# Patient Record
Sex: MALE | Race: WHITE | NOT HISPANIC OR LATINO | Employment: FULL TIME | URBAN - METROPOLITAN AREA
[De-identification: names, ages, dates, MRNs, and addresses within clinical notes are randomized per-mention and may not be internally consistent; named-entity substitution may affect disease eponyms.]

---

## 2017-01-13 ENCOUNTER — OFFICE VISIT (OUTPATIENT)
Dept: CARDIOLOGY | Facility: CLINIC | Age: 57
End: 2017-01-13
Payer: COMMERCIAL

## 2017-01-13 VITALS
HEIGHT: 74 IN | WEIGHT: 315 LBS | OXYGEN SATURATION: 97 % | SYSTOLIC BLOOD PRESSURE: 126 MMHG | DIASTOLIC BLOOD PRESSURE: 89 MMHG | RESPIRATION RATE: 16 BRPM | BODY MASS INDEX: 40.43 KG/M2 | HEART RATE: 86 BPM

## 2017-01-13 DIAGNOSIS — I10 ESSENTIAL HYPERTENSION: ICD-10-CM

## 2017-01-13 DIAGNOSIS — R07.9 CHEST PAIN, UNSPECIFIED TYPE: Primary | ICD-10-CM

## 2017-01-13 DIAGNOSIS — I20.9 ANGINA PECTORIS: ICD-10-CM

## 2017-01-13 DIAGNOSIS — E66.01 OBESITY, MORBID, BMI 40.0-49.9: ICD-10-CM

## 2017-01-13 DIAGNOSIS — E78.5 HYPERLIPIDEMIA, UNSPECIFIED HYPERLIPIDEMIA TYPE: ICD-10-CM

## 2017-01-13 DIAGNOSIS — G47.33 OSA (OBSTRUCTIVE SLEEP APNEA): ICD-10-CM

## 2017-01-13 DIAGNOSIS — I25.10 CORONARY ARTERY DISEASE INVOLVING NATIVE CORONARY ARTERY OF NATIVE HEART, ANGINA PRESENCE UNSPECIFIED: ICD-10-CM

## 2017-01-13 PROCEDURE — 3074F SYST BP LT 130 MM HG: CPT | Mod: S$GLB,,, | Performed by: NURSE PRACTITIONER

## 2017-01-13 PROCEDURE — 93000 ELECTROCARDIOGRAM COMPLETE: CPT | Mod: S$GLB,,, | Performed by: INTERNAL MEDICINE

## 2017-01-13 PROCEDURE — 99999 PR PBB SHADOW E&M-EST. PATIENT-LVL IV: CPT | Mod: PBBFAC,,, | Performed by: NURSE PRACTITIONER

## 2017-01-13 PROCEDURE — 3079F DIAST BP 80-89 MM HG: CPT | Mod: S$GLB,,, | Performed by: NURSE PRACTITIONER

## 2017-01-13 PROCEDURE — 1159F MED LIST DOCD IN RCRD: CPT | Mod: S$GLB,,, | Performed by: NURSE PRACTITIONER

## 2017-01-13 PROCEDURE — 99215 OFFICE O/P EST HI 40 MIN: CPT | Mod: S$GLB,,, | Performed by: NURSE PRACTITIONER

## 2017-01-13 RX ORDER — METOPROLOL TARTRATE 25 MG/1
1 TABLET, FILM COATED ORAL 2 TIMES DAILY
Refills: 1 | COMMUNITY
Start: 2016-12-16 | End: 2017-01-13 | Stop reason: SDUPTHER

## 2017-01-13 RX ORDER — ATORVASTATIN CALCIUM 40 MG/1
40 TABLET, FILM COATED ORAL DAILY
Qty: 90 TABLET | Refills: 3 | Status: SHIPPED | OUTPATIENT
Start: 2017-01-13 | End: 2020-10-21

## 2017-01-13 RX ORDER — DOXYCYCLINE HYCLATE 50 MG/1
CAPSULE ORAL
Refills: 5 | COMMUNITY
Start: 2016-12-15 | End: 2020-10-21

## 2017-01-13 RX ORDER — METOPROLOL SUCCINATE 25 MG/1
25 TABLET, EXTENDED RELEASE ORAL DAILY
Qty: 90 TABLET | Refills: 3 | Status: SHIPPED | OUTPATIENT
Start: 2017-01-13 | End: 2020-10-21

## 2017-01-13 RX ORDER — IVERMECTIN 10 MG/G
CREAM TOPICAL
Refills: 5 | COMMUNITY
Start: 2016-10-06 | End: 2020-10-21

## 2017-01-13 NOTE — MR AVS SNAPSHOT
[facility]  [address], [address]  [facility] [address]  Phone: [phone]                  Otis Colón   2017 11:00 AM   Office Visit    Description:  Male : 1960   Provider:  Alexia Frias NP   Department:  [facility] [place] [facility]           Reason for Visit     Hospital Follow Up           Diagnoses this Visit        Comments    Chest pain, unspecified type    -  Primary     Hyperlipidemia, unspecified hyperlipidemia type         Coronary artery disease involving native coronary artery of native heart, angina presence unspecified         Essential hypertension         Angina pectoris         Obesity, morbid, BMI 40.0-49.9         BLANKA (obstructive sleep apnea)                To Do List           Future Appointments        Provider Department Dept Phone    2017 11:00 AM Mitchell Swartz MD [facility] [phone]      Goals (5 Years of Data)     None      Follow-Up and Disposition     Return in about 6 months (around 2017).       These Medications        Disp Refills Start End    atorvastatin (LIPITOR) 40 MG tablet 90 tablet 3 2017    Take 1 tablet (40 mg total) by mouth once daily. - Oral    Pharmacy: [facility] [address] [facility] [address] [facility] Ph #: [phone]       metoprolol succinate (TOPROL-XL) 25 MG 24 hr tablet 90 tablet 3 2017    Take 1 tablet (25 mg total) by mouth once daily. - Oral    Pharmacy: [facility] [address] [facility] [address] [facility] Ph #: [phone]         [facility]     [facility] -  Assistance  Registered nurses in the [facility] provide clinical advisement, health education, appointment booking, and other advisory services.  Call for this free service at [phone].             Medications           Message regarding Medications     Verify  "the changes and/or additions to your medication regime listed below are the same as discussed with your clinician today.  If any of these changes or additions are incorrect, please notify your healthcare provider.        STOP taking these medications     metoprolol tartrate (LOPRESSOR) 25 MG tablet Take 1 tablet by mouth 2 (two) times daily.           Verify that the below list of medications is an accurate representation of the medications you are currently taking.  If none reported, the list may be blank. If incorrect, please contact your healthcare provider. Carry this list with you in case of emergency.           Current Medications     aspirin 81 MG Chew Take 4 tablets (324 mg total) by mouth once daily.    atorvastatin (LIPITOR) 40 MG tablet Take 1 tablet (40 mg total) by mouth once daily.    butalbital-acetaminophen-caffeine -40 mg (FIORICET, ESGIC) -40 mg per tablet Take 1 tablet by mouth every 4 (four) hours as needed for Pain.    doxycycline (VIBRAMYCIN) 50 MG capsule TK 1 C PO D    losartan-hydrochlorothiazide 100-25 mg (HYZAAR) 100-25 mg per tablet Take 1 tablet by mouth once daily.    methocarbamol (ROBAXIN) 500 MG Tab Take 500 mg by mouth every 8 (eight) hours as needed (muscle pain).     metoprolol succinate (TOPROL-XL) 25 MG 24 hr tablet Take 1 tablet (25 mg total) by mouth once daily.    SOOLANTRA 1 % Crea Apply to skin at bedtime           Clinical Reference Information           Vital Signs - Last Recorded  Most recent update: 1/13/2017 11:11 AM by Joseline Gregg LPN    BP Pulse Resp Ht Wt SpO2    126/89 (BP Location: Left arm, Patient Position: Sitting, BP Method: Automatic) 86 16 6' 2" (1.88 m) (!) 148 kg (326 lb 4.5 oz) 97%    BMI                41.89 kg/m2          Blood Pressure          Most Recent Value    Left Arm BP - Sitting  126/89    BP  126/89      Allergies as of 1/13/2017     No Known Allergies      Immunizations Administered on Date of Encounter - 1/13/2017     None    "   Orders Placed During Today's Visit     Future Labs/Procedures Expected by Expires    EKG 12-lead  As directed 1/13/2018      Instructions    Recommended Mediterranean dietEating Heart-Healthy Food: Using the DASH Plan  Eating for your heart doesnt have to be hard or boring. You just need to know how to make healthier choices. The DASH eating plan has been developed to help you do just that. DASH stands for Dietary Approaches to Stop Hypertension. It is a plan that has been proven to be healthier for your heart and to lower your risk for high blood pressure. It can also help lower your risk for cancer, heart disease, osteoporosis, and diabetes.  Choosing from Each Food Group  Choose foods from each of the food groups below each day. Try to get the recommended number of servings for each food group. The serving numbers are based on a diet of 2,000 calories a day. Talk to your doctor if youre unsure about your calorie needs.  Grains   Servings: 7-8 a day  A serving is:  · 1 slice bread  · 1 ounce dry cereal  · half a cup cooked rice or pasta  Best choices: Whole grains and any grains high in fiber.  Vegetables   Servings: 4-5 a day  A serving is:  · 1 cup raw leafy vegetable  · Half a cup cooked vegetable  · Three-quarter cup vegetable juice  Best choices: Fresh or frozen vegetable prepared without too much added salt or fat.    Fruits   Servings: 4-5 a day  A serving is:  · Three-quarter cup fruit juice  · 1 medium fruit  · One-quarter cup dried fruit  · One-half cup fresh, frozen, or canned fruit  Best choices: A variety of fresh fruits of different colors. Whole fruits are a much better choice than fruit juices.  Low-fat or Fat Free Dairy   Servings: 2-3 a day  A serving is:  · 8 ounces milk  · 1 cup yogurt  · One and a half ounces cheese  Best choices: Skim or 1% milk, low-fat or fat free yogurt or buttermilk, and low-fat cheeses.       Meat, Poultry, Fish   Servings: 2 or fewer a day  A serving is:  · 3 ounces  cooked meat, poultry, or fish  Best choices: Lean meats and fish. Trim away visible fat. Broil, roast, or boil instead of frying. Remove skin from poultry before eating.  Nuts, Seeds, Beans   Servings: 4-5 a week  A serving is:  · One third cup nuts (or one and a half ounces)  · 2 tablespoons sunflower seeds  · Half a cup cooked beans  Best choices: Dry roasted nuts with no salt added, lentils, kidney beans, garbanzo beans, and whole garza beans.    Fats and Oils   Servings: 2 a day  A serving is:  · 1 teaspoon vegetable oil  · 1 teaspoon soft margarine  · 1 tablespoon low-fat mayonnaise  · 1 teaspoon regular mayonnaise  · 2 tablespoons light salad dressing  · 1 tablespoon regular salad dressing  Best choices: Monounsaturated and polyunsaturated fats such as olive, canola, or safflower oil.  Sweets   Servings: 5 a week or fewer  A serving is:  · 1 tablespoon sugar, maple syrup, or honey  · 1 tablespoon jam or jelly  · 1 half-ounce jelly beans (about 15)  · 8 ounces lemonade  Best choices: Dried fruit can be a satisfying sweet. Choose low-fat sweets when possible. And watch your serving sizes!         Aerobic Exercise for a Healthy Heart  Exercise is a lot more than an energy booster and a stress reliever. It also strengthens your heart muscle, lowers your blood pressure and blood cholesterol, and burns calories.      Remember, some activity is better than none.     Choose an Aerobic Activity  Choose a nonstop activity that makes your heart and lungs work harder than they do when you rest or walk normally. This aerobic exercise can improve the way your heart and other muscles use oxygen. Make it fun by exercising with a friend and choosing an activity you enjoy. Here are some ideas:  · Walking  · Swimming  · Bicycling  · Stair climbing  · Dancing  · Jogging  Exercise Regularly  If you havent been exercising regularly,  get your doctors okay first. Then start slowly.  Here are some tips:  · Begin exercising 3  times a week for 5-10 minutes at a time.  · When you feel comfortable, add a few minutes each week.  · Slowly build up to exercising 3-4 times each week for 20-40 minutes. Aim for a total of 150 or more minutes a week.  · Be sure to carry your nitroglycerin with you when you exercise.  · If you get angina when youre exercising, stop what youre doing, take your nitroglycerin, and call your doctor.  © 5513-5868 [employer] [facility], [address] [place] [address], [place] [address]. All rights reserved. This information is not intended as a substitute for professional medical care. Always follow your healthcare professional's instructions.

## 2017-01-13 NOTE — PATIENT INSTRUCTIONS
Recommended Mediterranean dietEating Heart-Healthy Food: Using the DASH Plan  Eating for your heart doesnt have to be hard or boring. You just need to know how to make healthier choices. The DASH eating plan has been developed to help you do just that. DASH stands for Dietary Approaches to Stop Hypertension. It is a plan that has been proven to be healthier for your heart and to lower your risk for high blood pressure. It can also help lower your risk for cancer, heart disease, osteoporosis, and diabetes.  Choosing from Each Food Group  Choose foods from each of the food groups below each day. Try to get the recommended number of servings for each food group. The serving numbers are based on a diet of 2,000 calories a day. Talk to your doctor if youre unsure about your calorie needs.  Grains   Servings: 7-8 a day  A serving is:  · 1 slice bread  · 1 ounce dry cereal  · half a cup cooked rice or pasta  Best choices: Whole grains and any grains high in fiber.  Vegetables   Servings: 4-5 a day  A serving is:  · 1 cup raw leafy vegetable  · Half a cup cooked vegetable  · Three-quarter cup vegetable juice  Best choices: Fresh or frozen vegetable prepared without too much added salt or fat.    Fruits   Servings: 4-5 a day  A serving is:  · Three-quarter cup fruit juice  · 1 medium fruit  · One-quarter cup dried fruit  · One-half cup fresh, frozen, or canned fruit  Best choices: A variety of fresh fruits of different colors. Whole fruits are a much better choice than fruit juices.  Low-fat or Fat Free Dairy   Servings: 2-3 a day  A serving is:  · 8 ounces milk  · 1 cup yogurt  · One and a half ounces cheese  Best choices: Skim or 1% milk, low-fat or fat free yogurt or buttermilk, and low-fat cheeses.       Meat, Poultry, Fish   Servings: 2 or fewer a day  A serving is:  · 3 ounces cooked meat, poultry, or fish  Best choices: Lean meats and fish. Trim away visible fat. Broil, roast, or boil instead of frying. Remove skin  from poultry before eating.  Nuts, Seeds, Beans   Servings: 4-5 a week  A serving is:  · One third cup nuts (or one and a half ounces)  · 2 tablespoons sunflower seeds  · Half a cup cooked beans  Best choices: Dry roasted nuts with no salt added, lentils, kidney beans, garbanzo beans, and whole garza beans.    Fats and Oils   Servings: 2 a day  A serving is:  · 1 teaspoon vegetable oil  · 1 teaspoon soft margarine  · 1 tablespoon low-fat mayonnaise  · 1 teaspoon regular mayonnaise  · 2 tablespoons light salad dressing  · 1 tablespoon regular salad dressing  Best choices: Monounsaturated and polyunsaturated fats such as olive, canola, or safflower oil.  Sweets   Servings: 5 a week or fewer  A serving is:  · 1 tablespoon sugar, maple syrup, or honey  · 1 tablespoon jam or jelly  · 1 half-ounce jelly beans (about 15)  · 8 ounces lemonade  Best choices: Dried fruit can be a satisfying sweet. Choose low-fat sweets when possible. And watch your serving sizes!         Aerobic Exercise for a Healthy Heart  Exercise is a lot more than an energy booster and a stress reliever. It also strengthens your heart muscle, lowers your blood pressure and blood cholesterol, and burns calories.      Remember, some activity is better than none.     Choose an Aerobic Activity  Choose a nonstop activity that makes your heart and lungs work harder than they do when you rest or walk normally. This aerobic exercise can improve the way your heart and other muscles use oxygen. Make it fun by exercising with a friend and choosing an activity you enjoy. Here are some ideas:  · Walking  · Swimming  · Bicycling  · Stair climbing  · Dancing  · Jogging  Exercise Regularly  If you havent been exercising regularly,  get your doctors okay first. Then start slowly.  Here are some tips:  · Begin exercising 3 times a week for 5-10 minutes at a time.  · When you feel comfortable, add a few minutes each week.  · Slowly build up to exercising 3-4 times  each week for 20-40 minutes. Aim for a total of 150 or more minutes a week.  · Be sure to carry your nitroglycerin with you when you exercise.  · If you get angina when youre exercising, stop what youre doing, take your nitroglycerin, and call your doctor.  © 1846-2783 [employer], [address] [place] [address], [place] [address]. All rights reserved. This information is not intended as a substitute for professional medical care. Always follow your healthcare professional's instructions.

## 2017-01-13 NOTE — PROGRESS NOTES
Subjective:    Patient ID:  Otis Colón is a 56 y.o. male who presents for Hospital Follow Up    PCP: Dr. Price   Neurologist: Dr. 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 male with a PMH HTN and cervical Stenosis. He presented to the hospital with acute chest pain. It started while he was playing golf. Due to his chronic neck pain, he took 2 aleve prior to playing golf. The pain was severe, mid-chest, and radiated through to the back. It was a sharp pressure. It was associated with SOB, nausea, and diaphoresis. He went home to rest, but when the pain did not go away, he came to the hospital. He also reported that last night while having a BM, he became weak and SOB. He took 2 muscle realaxants and went to sleep. On arrival to the ED, he was found to be hypotensive and he was bolused IVF. A CTA chest/abd was done to r/o aneurysm dissection, and it was negative. Also while in the ED, the patient reports that he had a seizure. He did not lose consciousness or awareness during the episode. A dose of ativan was given in the ED. He reported that he was still having the chest pain, but it is much better than earlier. Patient denied abdominal pain, vomiting, headache, vision changes, focal neuro-deficits, cough or fever. Patient was admitted to Hospitalist medicine service. Patient was evaluated by Dr. Márquez. Patient was monitored on telemetry medical floor, serial cardiac enzymes remained negative. Patient was evaluated by cardiologist and had further cardiac workup as mentioned below, which was negative for acute ischemia. Patient's symptoms resolved. Patient was discharged home in  "stable condition with following discharge plan of care. Total time with the patient was 30 minutes and greater than 50% was spent in counseling and coordination of care. The assessment and plan have been discussed at length. Physicians' notes reviewed. Labs and procedure reviewed.      HPI   During admission, troponins were negative and his EKG did not reveal any evidence of acute ischemia.  He had an abnormal stress test on 11/05/2016 which showed a drop in EF from 54% to 41% with stress (additional results below).  Given abnormal stress test and concern for left main or multivessel disease, the patient underwent a LHC on 11/07/2016.  LHC results revealed non-obstructive CAD (diffuse, distal disease).     Goal was to get patient on opitimal medication treatment.  He was discharged home on ASA 81mg daily, lipitor 40mg daily, Toprol XL 25 mg daily, and losartan-hctz 100-25mg daily. ARB initially held during admission given concern for SHANNON (improved with IVFs).  Patient has followed up with his PCP, Dr. Price and recent labs done in mid December 2016.  Patient discussed lab results with Dr. Price and reports that he was told that his kidneys "look good" and was advised to continued losartan-hctz.  He has been avoiding NSAIDs.  Patient went home on Metoprol succinate 25 mg daily but ran out of the medication after 30 days. States Dr. Price gave him a prescription for Metoprolol tartrate 25mg twice daily which he has only been taking once a day because he"hought it was same as the other Metorpolol."  He says that he is only taking metoprol tartrate once daily. Patient ran out of Lipitor a month after being discharged and has not taken it since.  States he didn't ask for a Lipitor prescription when he saw his PCP.   Has been taking ASA 81 mg daily and and losartan-hctz 100-25mg once daily.     Patient reports having 3 episodes of an acute decrease in energy ("low energy") with associated "real mild" chest pain at " "left lower sternal border since he was discharged on 11/08/2016.  Episodes lasted several hours.  Last episode was 1.5 - 2 weeks ago.  Patient reports that his SBP drops to 105 mmHg while his HR goes into the "high 90s" during these episodes.  Denies SOB/BARONE, dizziness, palpitations, syncope, sweating, abdominal pain, n/v.  + chronic neck pain, currently at baseline for patient.  He has been staying active with playing golf and working in his yard cutting grass on 1.5 acre lot since disharged.  Denies difficulty with these activities.  Had sleep study done with PCP 3 weeks ago, awaiting CPAP     Recent cardiology tests  [facility] 11/10/2016  B. Summary/Post-Operative Diagnosis   Non-obstructive CAD.    D. Hemodynamic Results   LVEDP (Post): 17 mmHg    Air rest:  AO: 128/77    E. Angiographic Results   Diagnostic:        Patient has a right dominant coronary artery.        - Left Main Coronary Artery:             The LM is normal. There is BARBARA 3 flow.     - Left Anterior Descending Artery:             The LAD has luminal irregularities. There is BARBARA 3 flow.     - Left Circumflex Artery:             The LCX has luminal irregularities. There is BARBARA 3 flow.     - Right Coronary Artery:             The RCA has luminal irregularities. There is BARBARA 3 flow.     - Common Femoral Artery:             The right CFA is normal.     - Femoral Artery:             The femoral artery is normal.     G. Recommendations   1. Routine post-cath care.  2. Maximize medical management.  3. Reassurance.  4. Risk factor reductions.  5. ASA 81mg.  6. Weight loss.  7. Statin therapy.    NM stress test/Lexiscan 11/05/2017:  EKG Conclusions:    1. The EKG portion of this study is negative for ischemia at a peak heart rate of 97 bpm (59% of predicted).   2. Blood pressure remained stable throughout the protocol  (Presenting BP: 139/102 Peak BP: 153/84).   3. No significant arrhythmias were present.   4. The patient reported significant chest pain and " "dyspnea during the protocol which resolved in recovery.     1.  No scintigraphic evidence for reversible cardiac ischemia or infarct.    2. Global hypokinesia and decreased left ventricular ejection fraction (41%) during the stress portion of the examination.  There was normal wall motion and a normal left ventricular ejection fraction (54%) at rest.    Echo 11/05/2016  CONCLUSIONS     1 - Normal left ventricular systolic function (EF 55-60%).     2 - Normal left ventricular diastolic function.     3 - Normal right ventricular systolic function .     Retroperitoneal US 11/06/2016  Findings:The the right kidney measures approximately 11.4 x 4.5 x 4.2 cm.  Normal corticomedullary differentiation is seen.  No hydronephrosis, stone or focal mass is seen.  Normal resistive index of 0.61 is noted within an interpolar region intrarenal artery.    The left kidney measures approximately 12.2 x 5.4 x 4.5 cm.  Normal corticomedullary differentiation is seen.  No hydronephrosis, stone or focal mass is seen.  Normal resistive index of 0.58 is noted within an interpolar region intrarenal artery.    The urinary bladder is mildly well-distended.  No focal bladder wall thickening noted.    Study slightly challenging secondary to patient body habitus.    CTA chest and abdomen 11/04/2017  1.  No evidence for aortic aneurysm, dissection or other acute findings in the chest, abdomen or pelvis to explain this patient's symptoms.  2.  Abnormal findings:  -Prior lower cervical ACDF  -Hepatic steatosis.      Review of Systems   Constitution: Negative for chills, diaphoresis, fever, weakness, night sweats and weight gain.        3 episodes of having "no energy" lasting hours.  Last episode 1.5 to 2 weeks ago.    HENT: Negative for congestion and sore throat.    Eyes: Negative for visual disturbance.   Cardiovascular: Positive for chest pain ("real mild" CP x 3 episodes - last episode 1.5 - 2 weeks ago. ). Negative for dyspnea on exertion, " irregular heartbeat, leg swelling, near-syncope, orthopnea and palpitations.   Respiratory: Positive for cough (mild, intermittent with yellow sputum x 8 weeks, improving now ) and snoring. Negative for hemoptysis and shortness of breath.    Endocrine: Negative for cold intolerance, heat intolerance, polydipsia, polyphagia and polyuria.   Hematologic/Lymphatic: Does not bruise/bleed easily.   Skin: Negative for color change, itching and rash.   Musculoskeletal: Negative for arthritis, back pain and joint swelling.   Gastrointestinal: Negative for abdominal pain, constipation, diarrhea, heartburn, hematochezia, melena, nausea and vomiting.   Genitourinary: Negative for dysuria and hematuria.   Neurological: Negative for dizziness, focal weakness, light-headedness, numbness, paresthesias and sensory change.   Psychiatric/Behavioral: Negative for depression.   Allergic/Immunologic: Negative for environmental allergies.             Objective:    Physical Exam   Constitutional: He is oriented to person, place, and time. He appears well-developed and well-nourished. No distress.   HENT:   Head: Normocephalic and atraumatic.   Eyes: Conjunctivae and EOM are normal. Right eye exhibits no discharge. Left eye exhibits no discharge. No scleral icterus.   Neck: Normal range of motion. Normal carotid pulses and no JVD present. Carotid bruit is not present.   Cardiovascular: Normal rate, regular rhythm and normal heart sounds.  Exam reveals no gallop and no friction rub.    No murmur heard.  Pulses:       Radial pulses are 2+ on the right side, and 2+ on the left side.        Dorsalis pedis pulses are 1+ on the right side, and 1+ on the left side.   Pulmonary/Chest: Effort normal and breath sounds normal. He has no wheezes. He has no rales. He exhibits no tenderness.   Abdominal: Soft. Bowel sounds are normal. There is no tenderness. There is no guarding.   Musculoskeletal: Normal range of motion.   No evidence of LE pitting  "edema.    Neurological: He is alert and oriented to person, place, and time.   Skin: Skin is warm and dry. He is not diaphoretic. No cyanosis. Nails show no clubbing.   Psychiatric: He has a normal mood and affect. His behavior is normal.   Vitals reviewed.  Waist circumference: 52.5 inches  Hip circumference:50.5 inches  Neck circumference:  18 inches  Stanville score: 4        Visit Vitals    /89 (BP Location: Left arm, Patient Position: Sitting, BP Method: Automatic)    Pulse 86    Resp 16    Ht 6' 2" (1.88 m)    Wt (!) 148 kg (326 lb 4.5 oz)    SpO2 97%    BMI 41.89 kg/m2       In office EKG:  NSR, 76 bpm, normal axis, normal conduction, no significant ST segment or t wave abnormalities.     Assessment:         Patient Active Problem List   Diagnosis    Acute chest pain    Acute renal failure    Angina pectoris    Stenosis, cervical spine    Obesity, morbid, BMI 40.0-49.9    Essential hypertension    CAD (coronary artery disease)    BLANKA (obstructive sleep apnea)        Plan:             CAD, HTN, HLD, Chest pain   - doing well.      - continue statin (lipitor 40mg), ASA, BB, and ARB         - ran out of Toprol XL and lipitor one month after being discharged.  Saw PCP and obtained a prescription for metoprolol but received Rx for metoprolol tartrate instead of Toprol XL.  Patient was only taking metoprolol tartrate once daily instead of twice daily.                            - Patient advised to STOP taking Metoprolol tartrate and to RESUME taking Metoprolol succinate 25 mg once daily                                        - ORDER placed for Metoprolol succinate 25 mg po once daily. 90 tablets with 3 refills.         - ARB held briefly during admission in 11/2016 given concern for SHANNON. ARB was resumed after improvement in renal after IVFs          - recent labs with PCP in mid December 2016, told by PCP "kidneys look good."                          -  Patient will be faxing over lab results. "   - In office EKG   - Check home blood pressure, 2 days weekly, do 2 readings within 5 minutes in AM and PM, keep log for review.  - needs to reduce risk  - weight loss  - Instruction for Mediterranean diet and heart healthy exercise given.  - f/u in 6 months with Dr. Swartz; if doing well at that point, semi-annual or annual visits may be reasonable.     BLANKA  - Had sleep study done with PCP 3 weeks ago, awaiting CPAP     Morbid obesity   - counseled patient on weight loss.   - Needs good exercise program, 4 key elements: 1. Aerobic (walking, swimming, dancing, jogging, biking, etc, 2. Muscle strengthening / resistance exercise, need to do 2-3 times weekly, 3. Stretching daily, good stretch takes a whole  total minute. 4. Balance exercise daily.      Total patient time was 40  minutes and greater than 50% was spent in counseling and coordination of care. Labs and procedures reviewed.   Problem List reviewed.  Alexia FISHER, NP-C

## 2017-06-02 ENCOUNTER — TELEPHONE (OUTPATIENT)
Dept: CARDIOLOGY | Facility: CLINIC | Age: 57
End: 2017-06-02

## 2017-06-02 NOTE — TELEPHONE ENCOUNTER
"Pt call transferred in from phone room. Spoke to pt about rescheduling appointment per Dr Swartz. Pt stated that he "was tired of being rescheduled and getting a run around." Pt requested that his appointment be completely canceled and that he would find another Cardiologist if he felt he needed to be seen.   I offered pt several other options including being worked in sooner if possible. Pt refused and continued to state that he did not want to reschedule and to cancel the appointment.  Appointment was canceled per pt request.   "

## 2017-06-02 NOTE — TELEPHONE ENCOUNTER
Called pt, no answer. Per dr. Swartz pt needs to reschedule. Left message with clinic # to call back.

## 2020-10-21 ENCOUNTER — HOSPITAL ENCOUNTER (OUTPATIENT)
Facility: HOSPITAL | Age: 60
Discharge: HOME | End: 2020-10-22
Attending: EMERGENCY MEDICINE | Admitting: STUDENT IN AN ORGANIZED HEALTH CARE EDUCATION/TRAINING PROGRAM
Payer: MEDICAID

## 2020-10-21 ENCOUNTER — OFFICE VISIT (OUTPATIENT)
Dept: FAMILY MEDICINE | Facility: CLINIC | Age: 60
End: 2020-10-21
Payer: MEDICAID

## 2020-10-21 ENCOUNTER — CLINICAL SUPPORT (OUTPATIENT)
Dept: CARDIOLOGY | Facility: HOSPITAL | Age: 60
End: 2020-10-21
Attending: EMERGENCY MEDICINE
Payer: MEDICAID

## 2020-10-21 VITALS
OXYGEN SATURATION: 97 % | HEIGHT: 74 IN | DIASTOLIC BLOOD PRESSURE: 96 MMHG | SYSTOLIC BLOOD PRESSURE: 130 MMHG | TEMPERATURE: 98 F | BODY MASS INDEX: 40.43 KG/M2 | WEIGHT: 315 LBS | HEART RATE: 75 BPM

## 2020-10-21 DIAGNOSIS — E66.01 OBESITY, MORBID, BMI 40.0-49.9: ICD-10-CM

## 2020-10-21 DIAGNOSIS — I10 ESSENTIAL HYPERTENSION: ICD-10-CM

## 2020-10-21 DIAGNOSIS — G45.9 TIA (TRANSIENT ISCHEMIC ATTACK): Primary | ICD-10-CM

## 2020-10-21 DIAGNOSIS — R07.9 CHEST PAIN: ICD-10-CM

## 2020-10-21 DIAGNOSIS — G45.9 TRANSIENT ISCHEMIC ATTACK: Primary | ICD-10-CM

## 2020-10-21 LAB
ALBUMIN SERPL BCP-MCNC: 3.9 G/DL (ref 3.5–5.2)
ALP SERPL-CCNC: 63 U/L (ref 55–135)
ALT SERPL W/O P-5'-P-CCNC: 22 U/L (ref 10–44)
ANION GAP SERPL CALC-SCNC: 11 MMOL/L (ref 8–16)
AORTIC ROOT ANNULUS: 3.91 CM
AORTIC VALVE CUSP SEPERATION: 2.35 CM
AST SERPL-CCNC: 19 U/L (ref 10–40)
AV INDEX (PROSTH): 0.92
AV MEAN GRADIENT: 2 MMHG
AV PEAK GRADIENT: 3 MMHG
AV VALVE AREA: 2.92 CM2
AV VELOCITY RATIO: 81.48
BASOPHILS # BLD AUTO: 0.04 K/UL (ref 0–0.2)
BASOPHILS NFR BLD: 0.7 % (ref 0–1.9)
BILIRUB SERPL-MCNC: 1 MG/DL (ref 0.1–1)
BILIRUB UR QL STRIP: NEGATIVE
BNP SERPL-MCNC: 37 PG/ML (ref 0–99)
BSA FOR ECHO PROCEDURE: 2.77 M2
BUN SERPL-MCNC: 10 MG/DL (ref 6–20)
CALCIUM SERPL-MCNC: 9.1 MG/DL (ref 8.7–10.5)
CHLORIDE SERPL-SCNC: 100 MMOL/L (ref 95–110)
CHOLEST SERPL-MCNC: 219 MG/DL (ref 120–199)
CHOLEST/HDLC SERPL: 5.3 {RATIO} (ref 2–5)
CLARITY UR: CLEAR
CO2 SERPL-SCNC: 26 MMOL/L (ref 23–29)
COLOR UR: YELLOW
CREAT SERPL-MCNC: 1.3 MG/DL (ref 0.5–1.4)
CV ECHO LV RWT: 0.46 CM
DIFFERENTIAL METHOD: ABNORMAL
DOP CALC AO PEAK VEL: 0.9 M/S
DOP CALC AO VTI: 16.21 CM
DOP CALC LVOT AREA: 3.2 CM2
DOP CALC LVOT DIAMETER: 2.01 CM
DOP CALC LVOT PEAK VEL: 73.33 M/S
DOP CALC LVOT STROKE VOLUME: 47.29 CM3
DOP CALCLVOT PEAK VEL VTI: 14.91 CM
E WAVE DECELERATION TIME: 331.41 MSEC
E/A RATIO: 0.65
E/E' RATIO: 4.88 M/S
ECHO LV POSTERIOR WALL: 1.25 CM (ref 0.6–1.1)
EOSINOPHIL # BLD AUTO: 0.2 K/UL (ref 0–0.5)
EOSINOPHIL NFR BLD: 3 % (ref 0–8)
ERYTHROCYTE [DISTWIDTH] IN BLOOD BY AUTOMATED COUNT: 12.5 % (ref 11.5–14.5)
EST. GFR  (AFRICAN AMERICAN): >60 ML/MIN/1.73 M^2
EST. GFR  (NON AFRICAN AMERICAN): 59.3 ML/MIN/1.73 M^2
ESTIMATED AVG GLUCOSE: 100 MG/DL (ref 68–131)
FRACTIONAL SHORTENING: 40 % (ref 28–44)
GLUCOSE SERPL-MCNC: 98 MG/DL (ref 70–110)
GLUCOSE UR QL STRIP: NEGATIVE
HBA1C MFR BLD HPLC: 5.1 % (ref 4.5–6.2)
HCT VFR BLD AUTO: 43.2 % (ref 40–54)
HDLC SERPL-MCNC: 41 MG/DL (ref 40–75)
HDLC SERPL: 18.7 % (ref 20–50)
HGB BLD-MCNC: 15 G/DL (ref 14–18)
HGB UR QL STRIP: NEGATIVE
IMM GRANULOCYTES # BLD AUTO: 0 K/UL (ref 0–0.04)
IMM GRANULOCYTES NFR BLD AUTO: 0 % (ref 0–0.5)
INTERVENTRICULAR SEPTUM: 1.25 CM (ref 0.6–1.1)
IVRT: 91.17 MSEC
KETONES UR QL STRIP: NEGATIVE
LDLC SERPL CALC-MCNC: 149.6 MG/DL (ref 63–159)
LEFT ATRIUM SIZE: 4.25 CM
LEFT INTERNAL DIMENSION IN SYSTOLE: 3.25 CM (ref 2.1–4)
LEFT VENTRICLE MASS INDEX: 105 G/M2
LEFT VENTRICULAR INTERNAL DIMENSION IN DIASTOLE: 5.41 CM (ref 3.5–6)
LEFT VENTRICULAR MASS: 280.63 G
LEUKOCYTE ESTERASE UR QL STRIP: NEGATIVE
LV LATERAL E/E' RATIO: 4.88 M/S
LV SEPTAL E/E' RATIO: 4.88 M/S
LYMPHOCYTES # BLD AUTO: 1.7 K/UL (ref 1–4.8)
LYMPHOCYTES NFR BLD: 28.8 % (ref 18–48)
MAGNESIUM SERPL-MCNC: 1.9 MG/DL (ref 1.6–2.6)
MCH RBC QN AUTO: 30.5 PG (ref 27–31)
MCHC RBC AUTO-ENTMCNC: 34.7 G/DL (ref 32–36)
MCV RBC AUTO: 88 FL (ref 82–98)
MONOCYTES # BLD AUTO: 0.5 K/UL (ref 0.3–1)
MONOCYTES NFR BLD: 8.7 % (ref 4–15)
MV PEAK A VEL: 0.6 M/S
MV PEAK E VEL: 0.39 M/S
NEUTROPHILS # BLD AUTO: 3.4 K/UL (ref 1.8–7.7)
NEUTROPHILS NFR BLD: 58.8 % (ref 38–73)
NITRITE UR QL STRIP: NEGATIVE
NONHDLC SERPL-MCNC: 178 MG/DL
NRBC BLD-RTO: 0 /100 WBC
PH UR STRIP: 8 [PH] (ref 5–8)
PLATELET # BLD AUTO: 315 K/UL (ref 150–350)
PMV BLD AUTO: 8.6 FL (ref 9.2–12.9)
POTASSIUM SERPL-SCNC: 3.9 MMOL/L (ref 3.5–5.1)
PROT SERPL-MCNC: 7.1 G/DL (ref 6–8.4)
PROT UR QL STRIP: NEGATIVE
PV PEAK VELOCITY: 81.89 CM/S
RA PRESSURE: 3 MMHG
RBC # BLD AUTO: 4.91 M/UL (ref 4.6–6.2)
RIGHT VENTRICULAR END-DIASTOLIC DIMENSION: 367 CM
SARS-COV-2 RDRP RESP QL NAA+PROBE: NEGATIVE
SODIUM SERPL-SCNC: 137 MMOL/L (ref 136–145)
SP GR UR STRIP: 1.01 (ref 1–1.03)
TDI LATERAL: 0.08 M/S
TDI SEPTAL: 0.08 M/S
TDI: 0.08 M/S
TRIGL SERPL-MCNC: 142 MG/DL (ref 30–150)
TROPONIN I SERPL DL<=0.01 NG/ML-MCNC: <0.03 NG/ML
TSH SERPL DL<=0.005 MIU/L-ACNC: 2.19 UIU/ML (ref 0.34–5.6)
URN SPEC COLLECT METH UR: NORMAL
UROBILINOGEN UR STRIP-ACNC: NEGATIVE EU/DL
WBC # BLD AUTO: 5.76 K/UL (ref 3.9–12.7)

## 2020-10-21 PROCEDURE — 84443 ASSAY THYROID STIM HORMONE: CPT

## 2020-10-21 PROCEDURE — 99285 EMERGENCY DEPT VISIT HI MDM: CPT | Mod: 25

## 2020-10-21 PROCEDURE — 93010 ELECTROCARDIOGRAM REPORT: CPT | Mod: ,,, | Performed by: INTERNAL MEDICINE

## 2020-10-21 PROCEDURE — 25000003 PHARM REV CODE 250: Performed by: EMERGENCY MEDICINE

## 2020-10-21 PROCEDURE — 93306 TTE W/DOPPLER COMPLETE: CPT

## 2020-10-21 PROCEDURE — G0378 HOSPITAL OBSERVATION PER HR: HCPCS

## 2020-10-21 PROCEDURE — 85025 COMPLETE CBC W/AUTO DIFF WBC: CPT

## 2020-10-21 PROCEDURE — 25000003 PHARM REV CODE 250: Performed by: NURSE PRACTITIONER

## 2020-10-21 PROCEDURE — 80053 COMPREHEN METABOLIC PANEL: CPT

## 2020-10-21 PROCEDURE — U0002 COVID-19 LAB TEST NON-CDC: HCPCS

## 2020-10-21 PROCEDURE — 83880 ASSAY OF NATRIURETIC PEPTIDE: CPT

## 2020-10-21 PROCEDURE — 84484 ASSAY OF TROPONIN QUANT: CPT

## 2020-10-21 PROCEDURE — 80061 LIPID PANEL: CPT

## 2020-10-21 PROCEDURE — 93306 ECHO (CUPID ONLY): ICD-10-PCS | Mod: 26,,, | Performed by: INTERNAL MEDICINE

## 2020-10-21 PROCEDURE — 83036 HEMOGLOBIN GLYCOSYLATED A1C: CPT

## 2020-10-21 PROCEDURE — 83735 ASSAY OF MAGNESIUM: CPT

## 2020-10-21 PROCEDURE — 93005 ELECTROCARDIOGRAM TRACING: CPT | Performed by: INTERNAL MEDICINE

## 2020-10-21 PROCEDURE — 36415 COLL VENOUS BLD VENIPUNCTURE: CPT

## 2020-10-21 PROCEDURE — 93306 TTE W/DOPPLER COMPLETE: CPT | Mod: 26,,, | Performed by: INTERNAL MEDICINE

## 2020-10-21 PROCEDURE — 81003 URINALYSIS AUTO W/O SCOPE: CPT

## 2020-10-21 PROCEDURE — 84484 ASSAY OF TROPONIN QUANT: CPT | Mod: 91

## 2020-10-21 PROCEDURE — 93010 EKG 12-LEAD: ICD-10-PCS | Mod: ,,, | Performed by: INTERNAL MEDICINE

## 2020-10-21 RX ORDER — LABETALOL HYDROCHLORIDE 5 MG/ML
10 INJECTION, SOLUTION INTRAVENOUS
Status: DISCONTINUED | OUTPATIENT
Start: 2020-10-21 | End: 2020-10-22 | Stop reason: HOSPADM

## 2020-10-21 RX ORDER — NAPROXEN SODIUM 220 MG
220 TABLET ORAL
Status: ON HOLD | COMMUNITY
End: 2020-10-22 | Stop reason: HOSPADM

## 2020-10-21 RX ORDER — POTASSIUM CHLORIDE 7.45 MG/ML
40 INJECTION INTRAVENOUS
Status: DISCONTINUED | OUTPATIENT
Start: 2020-10-21 | End: 2020-10-22 | Stop reason: HOSPADM

## 2020-10-21 RX ORDER — MAGNESIUM SULFATE HEPTAHYDRATE 40 MG/ML
2 INJECTION, SOLUTION INTRAVENOUS
Status: DISCONTINUED | OUTPATIENT
Start: 2020-10-21 | End: 2020-10-22 | Stop reason: HOSPADM

## 2020-10-21 RX ORDER — POLYETHYLENE GLYCOL 3350 17 G/17G
17 POWDER, FOR SOLUTION ORAL 2 TIMES DAILY PRN
Status: DISCONTINUED | OUTPATIENT
Start: 2020-10-21 | End: 2020-10-22 | Stop reason: HOSPADM

## 2020-10-21 RX ORDER — MAGNESIUM SULFATE 1 G/100ML
1 INJECTION INTRAVENOUS
Status: DISCONTINUED | OUTPATIENT
Start: 2020-10-21 | End: 2020-10-22 | Stop reason: HOSPADM

## 2020-10-21 RX ORDER — POTASSIUM CHLORIDE 20 MEQ/1
20 TABLET, EXTENDED RELEASE ORAL
Status: DISCONTINUED | OUTPATIENT
Start: 2020-10-21 | End: 2020-10-22 | Stop reason: HOSPADM

## 2020-10-21 RX ORDER — NITROGLYCERIN 0.4 MG/1
0.4 TABLET SUBLINGUAL EVERY 5 MIN PRN
Status: DISCONTINUED | OUTPATIENT
Start: 2020-10-21 | End: 2020-10-22 | Stop reason: HOSPADM

## 2020-10-21 RX ORDER — MAGNESIUM SULFATE HEPTAHYDRATE 40 MG/ML
4 INJECTION, SOLUTION INTRAVENOUS
Status: DISCONTINUED | OUTPATIENT
Start: 2020-10-21 | End: 2020-10-22 | Stop reason: HOSPADM

## 2020-10-21 RX ORDER — NAPROXEN SODIUM 220 MG/1
325 TABLET, FILM COATED ORAL DAILY
Status: DISCONTINUED | OUTPATIENT
Start: 2020-10-22 | End: 2020-10-22

## 2020-10-21 RX ORDER — AMOXICILLIN 250 MG
1 CAPSULE ORAL 2 TIMES DAILY
Status: DISCONTINUED | OUTPATIENT
Start: 2020-10-21 | End: 2020-10-22 | Stop reason: HOSPADM

## 2020-10-21 RX ORDER — POTASSIUM CHLORIDE 7.45 MG/ML
20 INJECTION INTRAVENOUS
Status: DISCONTINUED | OUTPATIENT
Start: 2020-10-21 | End: 2020-10-22 | Stop reason: HOSPADM

## 2020-10-21 RX ORDER — LOSARTAN POTASSIUM 50 MG/1
100 TABLET ORAL DAILY
Status: DISCONTINUED | OUTPATIENT
Start: 2020-10-21 | End: 2020-10-22 | Stop reason: HOSPADM

## 2020-10-21 RX ORDER — ASPIRIN 325 MG
325 TABLET, DELAYED RELEASE (ENTERIC COATED) ORAL
Status: COMPLETED | OUTPATIENT
Start: 2020-10-21 | End: 2020-10-21

## 2020-10-21 RX ORDER — ONDANSETRON 2 MG/ML
4 INJECTION INTRAMUSCULAR; INTRAVENOUS EVERY 8 HOURS PRN
Status: DISCONTINUED | OUTPATIENT
Start: 2020-10-21 | End: 2020-10-22 | Stop reason: HOSPADM

## 2020-10-21 RX ORDER — ATORVASTATIN CALCIUM 40 MG/1
40 TABLET, FILM COATED ORAL DAILY
Status: DISCONTINUED | OUTPATIENT
Start: 2020-10-21 | End: 2020-10-22 | Stop reason: HOSPADM

## 2020-10-21 RX ORDER — POTASSIUM CHLORIDE 20 MEQ/1
40 TABLET, EXTENDED RELEASE ORAL
Status: DISCONTINUED | OUTPATIENT
Start: 2020-10-21 | End: 2020-10-22 | Stop reason: HOSPADM

## 2020-10-21 RX ORDER — SODIUM CHLORIDE 0.9 % (FLUSH) 0.9 %
10 SYRINGE (ML) INJECTION
Status: DISCONTINUED | OUTPATIENT
Start: 2020-10-21 | End: 2020-10-22 | Stop reason: HOSPADM

## 2020-10-21 RX ORDER — LANOLIN ALCOHOL/MO/W.PET/CERES
800 CREAM (GRAM) TOPICAL
Status: DISCONTINUED | OUTPATIENT
Start: 2020-10-21 | End: 2020-10-22 | Stop reason: HOSPADM

## 2020-10-21 RX ADMIN — ATORVASTATIN CALCIUM 40 MG: 40 TABLET, FILM COATED ORAL at 03:10

## 2020-10-21 RX ADMIN — NITROGLYCERIN 0.5 INCH: 20 OINTMENT TOPICAL at 11:10

## 2020-10-21 RX ADMIN — ASPIRIN 325 MG: 325 TABLET, COATED ORAL at 11:10

## 2020-10-21 NOTE — H&P
[facility] Medicine  History & Physical    DOS: 10/21/2020  12:30 PM      Patient Name: Otis Colón  MRN: [id number]  Admission Date: 10/21/2020  Attending Physician: Dr. Ynacey  Primary Care Provider: Eric Mercado NP         Patient information was obtained from patient, spouse/SO and ER records.     Subjective:     Principal Problem:Transient ischemic attack    Chief Complaint:   Chief Complaint   Patient presents with    Headache    Chest Pain        HPI: Mr. Colón is a 59 year old male with a history of HTN, CAD, BLANKA, and obesity who presents today with complaints of rt arm numbness 2 days ago. It is moderate. It was associated with expressive aphasia and slurred speech. He denies facial asymmetry, gait abnormality, N/V/D, or LOC. He also reports chest pain with onset today. It is substernal, doesn't radiate, and nonexertional. He reports Saturday he wasn't feeling like himself, was tired, slept in, dizzy, and had a decreased appetite. Sunday he felt better. Monday the episode of garbled and slurred speech with rt arm numbness occurred while driving home. It lasted a few minutes. Today, he went to his PCP who sent him to the ED for further work up. He had a negative CT of his head, and Dr. Dru Baker was consulted per ER MD. He is out of the window for tPA at this point since it has been 48 hours. In 2016, he had a full cardiac work up that included a cath which reports nonobstructive CAD. He was lost to follow up with his cardiologist and hasn't seen anyone since then. He is currently chest pain free.      Past Medical History:   Diagnosis Date    CAD (coronary artery disease) 1/13/2017    Cervical spinal stenosis     Hypertension     Migraine headache        Past Surgical History:   Procedure Laterality Date    APPENDECTOMY      CERVICAL FUSION  2009    CROSS FINGER FLAP      FRACTURE SURGERY      steel librado in right leg    KNEE ARTHROSCOPY Right     ORTHOPEDIC SURGERY  Right     librado from knee to ankle    RHIZOTOMY  08/2016       Review of patient's allergies indicates:  No Known Allergies    No current facility-administered medications on file prior to encounter.      Current Outpatient Medications on File Prior to Encounter   Medication Sig    aspirin 81 MG Chew Take 4 tablets (324 mg total) by mouth once daily. (Patient taking differently: Take 162 mg by mouth once daily. )    losartan-hydrochlorothiazide 100-25 mg (HYZAAR) 100-25 mg per tablet Take 1 tablet by mouth once daily.    naproxen sodium (ANAPROX) 220 MG tablet Take 220 mg by mouth every 12 (twelve) hours.    [DISCONTINUED] atorvastatin (LIPITOR) 40 MG tablet Take 1 tablet (40 mg total) by mouth once daily.    [DISCONTINUED] butalbital-acetaminophen-caffeine -40 mg (FIORICET, ESGIC) -40 mg per tablet Take 1 tablet by mouth every 4 (four) hours as needed for Pain.    [DISCONTINUED] doxycycline (VIBRAMYCIN) 50 MG capsule TK 1 C PO D    [DISCONTINUED] methocarbamol (ROBAXIN) 500 MG Tab Take 500 mg by mouth every 8 (eight) hours as needed (muscle pain).     [DISCONTINUED] metoprolol succinate (TOPROL-XL) 25 MG 24 hr tablet Take 1 tablet (25 mg total) by mouth once daily.    [DISCONTINUED] SOOLANTRA 1 % Crea Apply to skin at bedtime     Family History     Problem Relation (Age of Onset)    Hypertension Mother    No Known Problems Sister, Brother        Tobacco Use    Smoking status: Never Smoker    Smokeless tobacco: Never Used   Substance and Sexual Activity    Alcohol use: No    Drug use: No    Sexual activity: Not on file     Review of Systems   Constitutional: Positive for fatigue. Negative for chills, diaphoresis and fever.   HENT: Negative for congestion, ear pain, sore throat and trouble swallowing.    Eyes: Negative for pain, discharge and visual disturbance.   Respiratory: Negative for cough, chest tightness, shortness of breath and wheezing.    Cardiovascular: Positive for chest pain.  Negative for palpitations and leg swelling.   Gastrointestinal: Negative for abdominal distention, abdominal pain, blood in stool, constipation, diarrhea, nausea and vomiting.   Endocrine: Negative for polydipsia, polyphagia and polyuria.   Genitourinary: Negative for dysuria, flank pain, frequency and urgency.   Musculoskeletal: Negative for back pain, joint swelling, neck pain and neck stiffness.   Skin: Negative for rash and wound.   Allergic/Immunologic: Negative for immunocompromised state.   Neurological: Positive for speech difficulty and numbness. Negative for dizziness, syncope, weakness, light-headedness and headaches.   Hematological: Negative for adenopathy.   Psychiatric/Behavioral: Negative for confusion and suicidal ideas. The patient is not nervous/anxious.    All other systems reviewed and are negative.    Objective:     Vital Signs (Most Recent):  Temp: 98.9 °F (37.2 °C) (10/21/20 1435)  Pulse: 64 (10/21/20 1435)  Resp: 20 (10/21/20 1435)  BP: 104/65 (10/21/20 1435)  SpO2: (!) 94 % (10/21/20 1435) Vital Signs (24h Range):  Temp:  [98.1 °F (36.7 °C)-98.9 °F (37.2 °C)] 98.9 °F (37.2 °C)  Pulse:  [56-75] 64  Resp:  [10-20] 20  SpO2:  [92 %-97 %] 94 %  BP: (104-196)/() 104/65     Weight: (!) 147.1 kg (324 lb 4.8 oz)  Body mass index is 41.64 kg/m².    Physical Exam  Vitals signs and nursing note reviewed.   Constitutional:       Appearance: He is well-developed. He is obese.   HENT:      Head: Normocephalic and atraumatic.   Eyes:      Conjunctiva/sclera: Conjunctivae normal.      Pupils: Pupils are equal, round, and reactive to light.   Neck:      Musculoskeletal: Normal range of motion and neck supple.   Cardiovascular:      Rate and Rhythm: Regular rhythm. Bradycardia present.      Heart sounds: Normal heart sounds.   Pulmonary:      Effort: Pulmonary effort is normal.      Breath sounds: Normal breath sounds.   Abdominal:      General: Bowel sounds are normal.      Palpations: Abdomen is  soft.   Musculoskeletal: Normal range of motion.   Skin:     General: Skin is warm and dry.      Capillary Refill: Capillary refill takes less than 2 seconds.   Neurological:      Mental Status: He is alert and oriented to person, place, and time.   Psychiatric:         Behavior: Behavior normal.         Thought Content: Thought content normal.         Judgment: Judgment normal.           CRANIAL NERVES     CN III, IV, VI   Pupils are equal, round, and reactive to light.       Significant Labs:   CBC:   Recent Labs   Lab 10/21/20  1141   WBC 5.76   HGB 15.0   HCT 43.2        CMP:   Recent Labs   Lab 10/21/20  1141      K 3.9      CO2 26   GLU 98   BUN 10   CREATININE 1.3   CALCIUM 9.1   PROT 7.1   ALBUMIN 3.9   BILITOT 1.0   ALKPHOS 63   AST 19   ALT 22   ANIONGAP 11   EGFRNONAA 59.3*     Troponin:   Recent Labs   Lab 10/21/20  1141 10/21/20  1416   TROPONINI <0.030 <0.030       Significant Imaging: EKG: I have reviewed all pertinent results/findings within the past 24 hours and my personal findings are: Sinus Bradycardia, no acute ischemic changes     X-ray Chest Pa And Lateral    Result Date: 10/21/2020  Two-view chest x-ray CLINICAL HISTORY: Chest Pain The lungs are clear. The cardiomediastinal silhouette is normal in size. There are no osseous abnormalities. The patient has had prior cervical fusion. IMPRESSION: No acute pulmonary process. Electronically Signed by Angeles Rodriguez M.D. on 10/21/2020 11:14 AM    Ct Head Without Contrast    Result Date: 10/21/2020  CMS MANDATED QUALITY DATA - CT RADIATION - 436 All CT scans at this facility utilize dose modulation, iterative reconstruction, and/or weight based dosing when appropriate to reduce radiation dose to as low as reasonably achievable. Reason: Neuro deficit, acute, stroke suspected TECHNIQUE: Head CT without IV contrast. COMPARISON: None FINDINGS: Gray-white differentiation is maintained without hemorrhage, midline shift, or mass effect.  The ventricles and cisterns are maintained. Calvarium is intact. Visualized sinuses are clear. IMPRESSION: No acute intracranial abnormality. Electronically Signed by Leonel Yancey on 10/21/2020 11:45 AM    Us Carotid Bilateral    Result Date: 10/21/2020  CMS MANDATED QUALITY DATA - CAROTID - 195 All measurements and percent stenosis described below were determined using NASCET criteria or criteria similar to NASCET, as defined by the Society of Radiologists in Ultrasound Consensus Conference, Radiology, 2003 Reason: tia COMPARISON: None FINDINGS: Color Doppler, spectral Doppler, and grayscale analysis was performed. Grayscale images show no significant plaque throughout bilateral carotid arteries. Right internal carotid artery estimated peak systolic velocity is 48 cm/sec. Antegrade flow in right vertebral artery. Left internal carotid artery estimated peak systolic velocity is 57 cm/sec. Antegrade flow in left vertebral artery. IMPRESSION: No hemodynamically significant internal carotid artery stenosis. Electronically Signed by Ismael Lloyd M.D. on 10/21/2020 1:42 PM      Assessment/Plan:     Active Hospital Problems    Diagnosis    *Transient ischemic attack    BLANKA (obstructive sleep apnea)    Essential hypertension    Acute chest pain    Obesity, morbid, BMI 40.0-49.9     PLAN:  Admit to cardiology B  NIH/Neuro checks per protocol  CT head without acute intracranial abnormality  Consult Dr. Dru Baker - MRI/A, carotid doppler, Echo with bubble  Trend troponins - first is negative  No acute EKG changes  Continue ASA, add high dose statin  Continue home losartan  CPAP qHS per home settings  NTG prn  Work up and rule out TIA/CVA, will likely need stress test, but this may be able to be done safely outpatient   VTE Risk Mitigation (From admission, onward)         Ordered     IP VTE HIGH RISK PATIENT  Once      10/21/20 1246     Place sequential compression device  Until discontinued      10/21/20 1246                    Becca Fierro NP  Department of Hospital Medicine   [facility]

## 2020-10-21 NOTE — PROGRESS NOTES
SUBJECTIVE:      Patient ID: Otis Colón is a 59 y.o. male.    Chief Complaint: slurred speech and arm numbness    Mr Colón is here today to establish care. He says 2 days ago he experienced slurred speech and right arm numbness while driving home from [place]. His wife was present during the symptoms, she was driving. The numbness started in his right arm, he tried to talk at that time but his speech was slurred. The symptoms lasted 30 minutes. He has had a headache since the symptoms occurred. He has a history of HTN, hyperlipidemia and non obstructive CAD off medication for over a year.       Past Surgical History:   Procedure Laterality Date    APPENDECTOMY      CERVICAL FUSION  2009    CROSS FINGER FLAP      FRACTURE SURGERY      steel librado in right leg    KNEE ARTHROSCOPY Right     ORTHOPEDIC SURGERY Right     librado from knee to ankle    RHIZOTOMY  08/2016     Family History   Problem Relation Age of Onset    Hypertension Mother     No Known Problems Sister     No Known Problems Brother       Social History     Socioeconomic History    Marital status:      Spouse name: Not on file    Number of children: Not on file    Years of education: Not on file    Highest education level: Not on file   Occupational History    Not on file   Social Needs    Financial resource strain: Not on file    Food insecurity     Worry: Not on file     Inability: Not on file    Transportation needs     Medical: Not on file     Non-medical: Not on file   Tobacco Use    Smoking status: Never Smoker    Smokeless tobacco: Never Used   Substance and Sexual Activity    Alcohol use: No    Drug use: No    Sexual activity: Not on file   Lifestyle    Physical activity     Days per week: Not on file     Minutes per session: Not on file    Stress: Not on file   Relationships    Social connections     Talks on phone: Not on file     Gets together: Not on file     Attends Hinduism service: Not on  PATIENT NAME: Beny Stack RECORD NUMBER: [id number]  ACCOUNT NUMBER: [de-identified]  ADMIT DATE: 04/14/2017  DISCHARGE DATE: 04/14/2017  ATTENDING PHYSICIAN: Kyle Augustine MD  ROOM #:  SERVICE: [facility]    OPERATIVE REPORT      DATE OF SURGERY:  04/14/2017    SURGEON:  Kyle Augustine MD    PROCEDURE PERFORMED:  Upper endoscopy. PREOPERATIVE DIAGNOSES:  Anemia, autoimmune gastritis, gastroparesis. POSTOPERATIVE DIAGNOSES:  Anemia, autoimmune gastritis, gastroparesis, with  capsule placement. ASA CLASSIFICATION:  3. INDICATIONS FOR PROCEDURE:  The patient is a 43-year-old female who presents  with a history of autoimmune gastritis as well as gastroparesis and anemia who  presents for capsule endoscopy secondary to chronic anemia. Following  information with diagnostic data was reviewed prior to performance of the  procedure including history and physical, allergies, previous allergic or  adverse drug reactions, medications, prior hospitalizations, history of  sedation, and complications. The patient was assessed with the risks, benefits,  and alternatives of the procedure and classified as ASA 3 status. Discussion  was held with the patient concerning the use of sedation per Anesthesia. DESCRIPTION OF PROCEDURE:  After informed consent and time-out, the patient was  placed in left lateral decubitus position in endoscopy suite. Baseline vital  signs were obtained and monitored throughout the procedure. Bite block was then  placed. The endoscope was passed under direct visualization to the posterior  pharynx, esophagus, stomach, and into the duodenum, and on entering the stomach,  noted a large bolus of food retained in the stomach. Therefore, the endoscope  was removed. A capsule device was placed and under direct visualization was  passed to the level of the duodenum, where the capsule was deposited.   No  biopsies were taken secondary to a stomach full of food and wanted to abort the  procedure quickly. The endoscope was removed. There were no acute  complications, and the patient was sent to Recovery. FINDINGS:  Large food bolus. Capsule placement. RECOMMENDATIONS:  Follow up in the next 1 to 2 weeks for capsule results. Continue current medications and will likely need motility medications secondary  to gastroparesis.           Jose Sanchez MD      CC:  MD JOLIE Pascual/OLGA  DD: 04/14/2017  DT: 04/14/2017  TD: 09:29  TT: 15:34  [id number]           Electronically Signed On 04/14/2017 17:09  __________________________________________________   Gosia Valdivia file     Active member of club or organization: Not on file     Attends meetings of clubs or organizations: Not on file     Relationship status: Not on file   Other Topics Concern    Not on file   Social History Narrative    Not on file     Current Outpatient Medications   Medication Sig Dispense Refill    aspirin 81 MG Chew Take 4 tablets (324 mg total) by mouth once daily. (Patient taking differently: Take 81 mg by mouth once daily. )  0    losartan-hydrochlorothiazide 100-25 mg (HYZAAR) 100-25 mg per tablet Take 1 tablet by mouth once daily.       No current facility-administered medications for this visit.      Review of patient's allergies indicates:  No Known Allergies   Past Medical History:   Diagnosis Date    CAD (coronary artery disease) 1/13/2017    Cervical spinal stenosis     Hypertension     Migraine headache      Past Surgical History:   Procedure Laterality Date    APPENDECTOMY      CERVICAL FUSION  2009    CROSS FINGER FLAP      FRACTURE SURGERY      steel librado in right leg    KNEE ARTHROSCOPY Right     ORTHOPEDIC SURGERY Right     librado from knee to ankle    RHIZOTOMY  08/2016       Review of Systems   Constitutional: Negative for appetite change, chills, diaphoresis and unexpected weight change.   HENT: Negative for ear discharge, facial swelling, hearing loss, nosebleeds and trouble swallowing.    Eyes: Negative for photophobia, pain and visual disturbance.   Respiratory: Negative for apnea, choking, shortness of breath and wheezing.    Cardiovascular: Negative for chest pain and palpitations.   Gastrointestinal: Negative for abdominal pain, blood in stool and vomiting.   Endocrine: Negative for polyphagia.   Genitourinary: Negative for difficulty urinating and hematuria.   Musculoskeletal: Negative for arthralgias, gait problem and joint swelling.   Skin: Negative for pallor.   Neurological: Negative for seizures and speech difficulty.   Hematological: Does not bruise/bleed easily.  "  Psychiatric/Behavioral: Negative for agitation, confusion and dysphoric mood. The patient is not nervous/anxious.       OBJECTIVE:      Vitals:    10/21/20 0846   BP: (!) 130/96   Pulse: 75   Temp: 98.1 °F (36.7 °C)   TempSrc: Skin   SpO2: 97%   Weight: (!) 149.2 kg (329 lb)   Height: 6' 2" (1.88 m)     Physical Exam  Vitals signs and nursing note reviewed.   Constitutional:       Appearance: He is well-developed.   HENT:      Head: Atraumatic.   Cardiovascular:      Rate and Rhythm: Normal rate and regular rhythm.      Pulses: Normal pulses.      Heart sounds: Normal heart sounds.   Pulmonary:      Effort: Pulmonary effort is normal.      Breath sounds: Normal breath sounds.   Skin:     General: Skin is warm and dry.   Neurological:      Mental Status: He is alert and oriented to person, place, and time.   Psychiatric:         Mood and Affect: Mood normal.        Assessment:       1. TIA (transient ischemic attack)        Plan:       TIA (transient ischemic attack)  -     Patient sent to [facility] ER for TIA work up, report called    Follow up for f/u after discharge.      10/21/2020 Eric Mercado, PHILLIP, FNP        "

## 2020-10-21 NOTE — CONSULTS
[facility]  Neurology  Consult Note    Patient Name: Otis Colón  MRN: [id number]  Admission Date: 10/21/2020  Hospital Length of Stay: 0 days  Code Status: Full Code   Attending Provider: Angela Yancey DO   Consulting Provider: Jaz Guerrier DNP  Primary Care Physician: Eric Mercado NP  Principal Problem:<principal problem not specified>    Consults  Subjective:     Chief Complaint:     Headache    Chest Pain         HPI: per EMR: 59-year-old male presented emergency department with complaints of having a right upper extremity weakness and numbness and slurred speech which started 2 days ago and symptoms spontaneously resolved about 15 minutes later after that episode.  Patient went to PCP this morning and was sent here for admission.  Patient denies nausea or vomiting or shortness of breath.  Patient however complains of substernal chest pain which he describes as tightness which is intermittent and comes and goes.  Patient denies any fever or chills or nausea or vomiting or abdominal pain.  Denies any weakness or numbness at this time.  Speech is back to normal at this time.    INTERVAL HISTORY: Patient seen and examined with Dr. Dru Baker this morning in ED, wife at bedside. Patient states he still feels unwell and is c/o left substernal pain.     Brain imaging   CT head w/o contrast   IMPRESSION:     No acute intracranial abnormality.    MRI/A brain wo contrast pending     Neck imaging   CUS pending     Heart imaging   TTE w bubble pending     CRT: 1.3  LDL: 149.6  Hgb A1c: 5.1    Patient is not a tPA candidate as he presented to ED outside of tPA window  NIHSS 0    Past Medical History:   Diagnosis Date    CAD (coronary artery disease) 1/13/2017    Cervical spinal stenosis     Hypertension     Migraine headache        Past Surgical History:   Procedure Laterality Date    APPENDECTOMY      CERVICAL FUSION  2009    CROSS FINGER FLAP      FRACTURE SURGERY      steel librado in right  leg    KNEE ARTHROSCOPY Right     ORTHOPEDIC SURGERY Right     librado from knee to ankle    RHIZOTOMY  08/2016       Review of patient's allergies indicates:  No Known Allergies    Current Neurological Medications:     No current facility-administered medications on file prior to encounter.      Current Outpatient Medications on File Prior to Encounter   Medication Sig    aspirin 81 MG Chew Take 4 tablets (324 mg total) by mouth once daily. (Patient taking differently: Take 162 mg by mouth once daily. )    losartan-hydrochlorothiazide 100-25 mg (HYZAAR) 100-25 mg per tablet Take 1 tablet by mouth once daily.    naproxen sodium (ANAPROX) 220 MG tablet Take 220 mg by mouth every 12 (twelve) hours.    [DISCONTINUED] atorvastatin (LIPITOR) 40 MG tablet Take 1 tablet (40 mg total) by mouth once daily.    [DISCONTINUED] butalbital-acetaminophen-caffeine -40 mg (FIORICET, ESGIC) -40 mg per tablet Take 1 tablet by mouth every 4 (four) hours as needed for Pain.    [DISCONTINUED] doxycycline (VIBRAMYCIN) 50 MG capsule TK 1 C PO D    [DISCONTINUED] methocarbamol (ROBAXIN) 500 MG Tab Take 500 mg by mouth every 8 (eight) hours as needed (muscle pain).     [DISCONTINUED] metoprolol succinate (TOPROL-XL) 25 MG 24 hr tablet Take 1 tablet (25 mg total) by mouth once daily.    [DISCONTINUED] SOOLANTRA 1 % Crea Apply to skin at bedtime      Family History     Problem Relation (Age of Onset)    Hypertension Mother    No Known Problems Sister, Brother        Tobacco Use    Smoking status: Never Smoker    Smokeless tobacco: Never Used   Substance and Sexual Activity    Alcohol use: No    Drug use: No    Sexual activity: Not on file     Review of Systems   Constitutional: Negative.    HENT: Negative.    Eyes: Negative.    Respiratory: Negative.    Cardiovascular: Positive for chest pain. Negative for palpitations.   Gastrointestinal: Negative.    Musculoskeletal: Negative.    Neurological: Negative.     Hematological: Negative.         Objective:     Vital Signs (Most Recent):  Temp: 98.9 °F (37.2 °C) (10/21/20 1435)  Pulse: 64 (10/21/20 1435)  Resp: 20 (10/21/20 1435)  BP: 104/65 (10/21/20 1435)  SpO2: (!) 94 % (10/21/20 1435) Vital Signs (24h Range):  Temp:  [98.1 °F (36.7 °C)-98.9 °F (37.2 °C)] 98.9 °F (37.2 °C)  Pulse:  [56-75] 64  Resp:  [10-20] 20  SpO2:  [92 %-97 %] 94 %  BP: (104-196)/() 104/65     Weight: (!) 147.1 kg (324 lb 4.8 oz)  Body mass index is 41.64 kg/m².    Physical Exam  Vitals signs and nursing note reviewed.   HENT:      Head: Normocephalic and atraumatic.      Mouth/Throat:      Mouth: Mucous membranes are moist.      Pharynx: Oropharynx is clear.   Eyes:      Extraocular Movements: Extraocular movements intact and EOM normal.      Pupils: Pupils are equal, round, and reactive to light.   Neck:      Musculoskeletal: Normal range of motion.   Cardiovascular:      Rate and Rhythm: Normal rate.   Pulmonary:      Effort: Pulmonary effort is normal.   Musculoskeletal: Normal range of motion.   Skin:     General: Skin is warm and dry.   Neurological:      Mental Status: He is alert and oriented to person, place, and time.      Coordination: Finger-Nose-Finger Test normal.   Psychiatric:         Speech: Speech normal.         NEUROLOGICAL EXAMINATION:     MENTAL STATUS   Oriented to person, place, and time.   Follows 3 step commands.   Attention: normal. Concentration: normal.   Speech: speech is normal   Level of consciousness: alert  Normal comprehension.     CRANIAL NERVES     CN III, IV, VI   Pupils are equal, round, and reactive to light.  Extraocular motions are normal.   Right pupil: Size: 4 mm. Shape: regular. Reactivity: brisk.   Left pupil: Size: 4 mm. Shape: regular. Reactivity: brisk.     CN VII   Facial expression full, symmetric.     SENSORY EXAM   Light touch normal.     GAIT AND COORDINATION      Coordination   Finger to nose coordination: normal    Tremor   Resting tremor:  absent      Significant Labs:   Hemoglobin A1c:   Recent Labs   Lab 10/21/20  1141   HGBA1C 5.1     BMP:   Recent Labs   Lab 10/21/20  1141   GLU 98      K 3.9      CO2 26   BUN 10   CREATININE 1.3   CALCIUM 9.1   MG 1.9     CBC:   Recent Labs   Lab 10/21/20  1141   WBC 5.76   HGB 15.0   HCT 43.2        CMP:   Recent Labs   Lab 10/21/20  1141   GLU 98      K 3.9      CO2 26   BUN 10   CREATININE 1.3   CALCIUM 9.1   MG 1.9   PROT 7.1   ALBUMIN 3.9   BILITOT 1.0   ALKPHOS 63   AST 19   ALT 22   ANIONGAP 11   EGFRNONAA 59.3*     All pertinent lab results from the past 24 hours have been reviewed.    Significant Imaging: I have reviewed all pertinent imaging results/findings within the past 24 hours.    Assessment and Plan:  This is a 58 yo male with a PMH of CAD, hypertension, cervical spine stenosis and migraine presenting to ED with symptoms of right upper extremity weakness and numbness and slurred speech which started 2 days ago and symptoms spontaneously resolved about 15 minutes later. Neurology was consulted to evaluate symptoms and perform stroke sherman. On exam patient states neuro symptoms had resolved but he was still c/o chest pain.     1. R/O TIA / CVA   -CTH negative for acute bleed  -MRI/A, TTE w bubble, CUS pending   -Continue asa and statin for stroke prevention   -PT/OT/ST evaluations   -Maintain safety precautions     2. Hypertension   -IM following, home meds ordered    3. Hyperlipidemia   -LDL elevated, continue statin therapy for LDL goal <70.     W/u ongoing, will follow up .        Cc time this includes face to face time and time spent reviewing EMR and discussing POC with other physicians/providers.   Active Diagnoses:    Diagnosis Date Noted POA    Transient ischemic attack [G45.9] 10/21/2020 Yes      Problems Resolved During this Admission:       VTE Risk Mitigation (From admission, onward)         Ordered     IP VTE HIGH RISK PATIENT  Once      10/21/20 3406      Place sequential compression device  Until discontinued      10/21/20 4774                Thank you for your consult. I will follow-up with patient. Please contact us if you have any additional questions.    Jaz Guerrier DNP  Neurology  [facility]

## 2020-10-21 NOTE — SUBJECTIVE & OBJECTIVE
Past Medical History:   Diagnosis Date    CAD (coronary artery disease) 1/13/2017    Cervical spinal stenosis     Hypertension     Migraine headache        Past Surgical History:   Procedure Laterality Date    APPENDECTOMY      CERVICAL FUSION  2009    CROSS FINGER FLAP      FRACTURE SURGERY      steel librado in right leg    KNEE ARTHROSCOPY Right     ORTHOPEDIC SURGERY Right     librado from knee to ankle    RHIZOTOMY  08/2016       Review of patient's allergies indicates:  No Known Allergies    No current facility-administered medications on file prior to encounter.      Current Outpatient Medications on File Prior to Encounter   Medication Sig    aspirin 81 MG Chew Take 4 tablets (324 mg total) by mouth once daily. (Patient taking differently: Take 162 mg by mouth once daily. )    losartan-hydrochlorothiazide 100-25 mg (HYZAAR) 100-25 mg per tablet Take 1 tablet by mouth once daily.    naproxen sodium (ANAPROX) 220 MG tablet Take 220 mg by mouth every 12 (twelve) hours.    [DISCONTINUED] atorvastatin (LIPITOR) 40 MG tablet Take 1 tablet (40 mg total) by mouth once daily.    [DISCONTINUED] butalbital-acetaminophen-caffeine -40 mg (FIORICET, ESGIC) -40 mg per tablet Take 1 tablet by mouth every 4 (four) hours as needed for Pain.    [DISCONTINUED] doxycycline (VIBRAMYCIN) 50 MG capsule TK 1 C PO D    [DISCONTINUED] methocarbamol (ROBAXIN) 500 MG Tab Take 500 mg by mouth every 8 (eight) hours as needed (muscle pain).     [DISCONTINUED] metoprolol succinate (TOPROL-XL) 25 MG 24 hr tablet Take 1 tablet (25 mg total) by mouth once daily.    [DISCONTINUED] SOOLANTRA 1 % Crea Apply to skin at bedtime     Family History     Problem Relation (Age of Onset)    Hypertension Mother    No Known Problems Sister, Brother        Tobacco Use    Smoking status: Never Smoker    Smokeless tobacco: Never Used   Substance and Sexual Activity    Alcohol use: No    Drug use: No    Sexual activity: Not on file      Review of Systems   Constitutional: Positive for fatigue. Negative for chills, diaphoresis and fever.   HENT: Negative for congestion, ear pain, sore throat and trouble swallowing.    Eyes: Negative for pain, discharge and visual disturbance.   Respiratory: Negative for cough, chest tightness, shortness of breath and wheezing.    Cardiovascular: Positive for chest pain. Negative for palpitations and leg swelling.   Gastrointestinal: Negative for abdominal distention, abdominal pain, blood in stool, constipation, diarrhea, nausea and vomiting.   Endocrine: Negative for polydipsia, polyphagia and polyuria.   Genitourinary: Negative for dysuria, flank pain, frequency and urgency.   Musculoskeletal: Negative for back pain, joint swelling, neck pain and neck stiffness.   Skin: Negative for rash and wound.   Allergic/Immunologic: Negative for immunocompromised state.   Neurological: Positive for speech difficulty and numbness. Negative for dizziness, syncope, weakness, light-headedness and headaches.   Hematological: Negative for adenopathy.   Psychiatric/Behavioral: Negative for confusion and suicidal ideas. The patient is not nervous/anxious.    All other systems reviewed and are negative.    Objective:     Vital Signs (Most Recent):  Temp: 98.9 °F (37.2 °C) (10/21/20 1435)  Pulse: 64 (10/21/20 1435)  Resp: 20 (10/21/20 1435)  BP: 104/65 (10/21/20 1435)  SpO2: (!) 94 % (10/21/20 1435) Vital Signs (24h Range):  Temp:  [98.1 °F (36.7 °C)-98.9 °F (37.2 °C)] 98.9 °F (37.2 °C)  Pulse:  [56-75] 64  Resp:  [10-20] 20  SpO2:  [92 %-97 %] 94 %  BP: (104-196)/() 104/65     Weight: (!) 147.1 kg (324 lb 4.8 oz)  Body mass index is 41.64 kg/m².    Physical Exam  Vitals signs and nursing note reviewed.   Constitutional:       Appearance: He is well-developed. He is obese.   HENT:      Head: Normocephalic and atraumatic.   Eyes:      Conjunctiva/sclera: Conjunctivae normal.      Pupils: Pupils are equal, round, and reactive  to light.   Neck:      Musculoskeletal: Normal range of motion and neck supple.   Cardiovascular:      Rate and Rhythm: Regular rhythm. Bradycardia present.      Heart sounds: Normal heart sounds.   Pulmonary:      Effort: Pulmonary effort is normal.      Breath sounds: Normal breath sounds.   Abdominal:      General: Bowel sounds are normal.      Palpations: Abdomen is soft.   Musculoskeletal: Normal range of motion.   Skin:     General: Skin is warm and dry.      Capillary Refill: Capillary refill takes less than 2 seconds.   Neurological:      Mental Status: He is alert and oriented to person, place, and time.   Psychiatric:         Behavior: Behavior normal.         Thought Content: Thought content normal.         Judgment: Judgment normal.           CRANIAL NERVES     CN III, IV, VI   Pupils are equal, round, and reactive to light.       Significant Labs:   CBC:   Recent Labs   Lab 10/21/20  1141   WBC 5.76   HGB 15.0   HCT 43.2        CMP:   Recent Labs   Lab 10/21/20  1141      K 3.9      CO2 26   GLU 98   BUN 10   CREATININE 1.3   CALCIUM 9.1   PROT 7.1   ALBUMIN 3.9   BILITOT 1.0   ALKPHOS 63   AST 19   ALT 22   ANIONGAP 11   EGFRNONAA 59.3*     Troponin:   Recent Labs   Lab 10/21/20  1141 10/21/20  1416   TROPONINI <0.030 <0.030       Significant Imaging: EKG: I have reviewed all pertinent results/findings within the past 24 hours and my personal findings are: Sinus Bradycardia, no acute ischemic changes     X-ray Chest Pa And Lateral    Result Date: 10/21/2020  Two-view chest x-ray CLINICAL HISTORY: Chest Pain The lungs are clear. The cardiomediastinal silhouette is normal in size. There are no osseous abnormalities. The patient has had prior cervical fusion. IMPRESSION: No acute pulmonary process. Electronically Signed by Angeles Rodriguez M.D. on 10/21/2020 11:14 AM    Ct Head Without Contrast    Result Date: 10/21/2020  CMS MANDATED QUALITY DATA - CT RADIATION - 436 All CT scans at  this facility utilize dose modulation, iterative reconstruction, and/or weight based dosing when appropriate to reduce radiation dose to as low as reasonably achievable. Reason: Neuro deficit, acute, stroke suspected TECHNIQUE: Head CT without IV contrast. COMPARISON: None FINDINGS: Gray-white differentiation is maintained without hemorrhage, midline shift, or mass effect. The ventricles and cisterns are maintained. Calvarium is intact. Visualized sinuses are clear. IMPRESSION: No acute intracranial abnormality. Electronically Signed by Leonel Yancey on 10/21/2020 11:45 AM    Us Carotid Bilateral    Result Date: 10/21/2020  CMS MANDATED QUALITY DATA - CAROTID - 195 All measurements and percent stenosis described below were determined using NASCET criteria or criteria similar to NASCET, as defined by the Society of Radiologists in Ultrasound Consensus Conference, Radiology, 2003 Reason: tia COMPARISON: None FINDINGS: Color Doppler, spectral Doppler, and grayscale analysis was performed. Grayscale images show no significant plaque throughout bilateral carotid arteries. Right internal carotid artery estimated peak systolic velocity is 48 cm/sec. Antegrade flow in right vertebral artery. Left internal carotid artery estimated peak systolic velocity is 57 cm/sec. Antegrade flow in left vertebral artery. IMPRESSION: No hemodynamically significant internal carotid artery stenosis. Electronically Signed by Ismael Lloyd M.D. on 10/21/2020 1:42 PM

## 2020-10-21 NOTE — ED NOTES
Reports Monday 10/19 symptoms of slurring of words, inability to use correct words, and R side arm numbness and weakness that has resolved but mild chest pain has lingered.

## 2020-10-21 NOTE — ED PROVIDER NOTES
Left knee was he a look at that as he flexion of Encounter Date: 10/21/2020       History     Chief Complaint   Patient presents with    Headache    Chest Pain     59-year-old male presented emergency department with complaints of having a right upper extremity weakness and numbness and slurred speech which started 2 days ago and symptoms spontaneously resolved about 15 minutes later after that episode.  Patient went to PCP this morning and was sent here for admission.  Patient denies nausea or vomiting or shortness of breath.  Patient however complains of substernal chest pain which he describes as tightness which is intermittent and comes and goes.  Patient denies any fever or chills or nausea or vomiting or abdominal pain.  Denies any weakness or numbness at this time.  Speech is back to normal at this time.        Review of patient's allergies indicates:  No Known Allergies  Past Medical History:   Diagnosis Date    CAD (coronary artery disease) 1/13/2017    Cervical spinal stenosis     Hypertension     Migraine headache      Past Surgical History:   Procedure Laterality Date    APPENDECTOMY      CERVICAL FUSION  2009    CROSS FINGER FLAP      FRACTURE SURGERY      steel librado in right leg    KNEE ARTHROSCOPY Right     ORTHOPEDIC SURGERY Right     librado from knee to ankle    RHIZOTOMY  08/2016     Family History   Problem Relation Age of Onset    Hypertension Mother     No Known Problems Sister     No Known Problems Brother      Social History     Tobacco Use    Smoking status: Never Smoker    Smokeless tobacco: Never Used   Substance Use Topics    Alcohol use: No    Drug use: No     Review of Systems   Constitutional: Negative.    HENT: Negative.    Eyes: Negative.    Respiratory: Negative.    Cardiovascular: Positive for chest pain.   Gastrointestinal: Negative.    Endocrine: Negative.    Genitourinary: Negative.    Musculoskeletal: Negative.    Skin: Negative.    Allergic/Immunologic:  Negative.    Neurological: Positive for speech difficulty, weakness and numbness.   Hematological: Negative.    Psychiatric/Behavioral: Negative.    All other systems reviewed and are negative.      Physical Exam     Initial Vitals [10/21/20 1006]   BP Pulse Resp Temp SpO2   (!) 170/106 61 20 98.4 °F (36.9 °C) 96 %      MAP       --         Physical Exam    Nursing note and vitals reviewed.  Constitutional: He appears well-developed and well-nourished.   HENT:   Head: Normocephalic and atraumatic.   Nose: Nose normal.   Eyes: Conjunctivae and EOM are normal.   Neck: Normal range of motion. Neck supple. No tracheal deviation present.   Cardiovascular: Normal rate, regular rhythm, normal heart sounds and intact distal pulses. Exam reveals no friction rub.    No murmur heard.  Pulmonary/Chest: Breath sounds normal. No respiratory distress. He has no wheezes. He has no rales.   Abdominal: Soft. He exhibits no distension. There is no abdominal tenderness. There is no rebound and no guarding.   Musculoskeletal: Normal range of motion.   Neurological: He is alert and oriented to person, place, and time. He has normal strength. No cranial nerve deficit or sensory deficit. GCS score is 15. GCS eye subscore is 4. GCS verbal subscore is 5. GCS motor subscore is 6.   Skin: Skin is warm and dry. Capillary refill takes less than 2 seconds.   Psychiatric: He has a normal mood and affect. Thought content normal.         ED Course   Procedures  Labs Reviewed   CBC W/ AUTO DIFFERENTIAL - Abnormal; Notable for the following components:       Result Value    MPV 8.6 (*)     All other components within normal limits   COMPREHENSIVE METABOLIC PANEL - Abnormal; Notable for the following components:    eGFR if non  59.3 (*)     All other components within normal limits   MAGNESIUM   SARS-COV-2 RNA AMPLIFICATION, QUAL   TROPONIN I   B-TYPE NATRIURETIC PEPTIDE   TROPONIN I     EKG Readings: (Independently Interpreted)    Initial Reading: No STEMI. Rhythm: Normal Sinus Rhythm. Ectopy: No Ectopy. Conduction: Normal.     ECG Results          EKG 12-lead (In process)  Result time 10/21/20 10:52:05    In process by Interface, Lab In [facility] (10/21/20 10:52:05)                 Narrative:    Test Reason : R07.9,    Vent. Rate : 058 BPM     Atrial Rate : 058 BPM     P-R Int : 196 ms          QRS Dur : 096 ms      QT Int : 422 ms       P-R-T Axes : 023 004 026 degrees     QTc Int : 414 ms    Sinus bradycardia  Otherwise normal ECG  When compared with ECG of 13-JAN-2017 11:17,  No significant change was found    Referred By: [email or web address]   SELF           Confirmed By:                             Imaging Results          CT Head Without Contrast (Final result)  Result time 10/21/20 11:37:54    Final result by Leonel Yancey MD (10/21/20 11:37:54)                 Narrative:    CMS MANDATED QUALITY DATA - CT RADIATION - 436    All CT scans at this facility utilize dose modulation, iterative  reconstruction, and/or weight based dosing when appropriate to reduce  radiation dose to as low as reasonably achievable.        Reason: Neuro deficit, acute, stroke suspected    TECHNIQUE: Head CT without IV contrast.    COMPARISON: None    FINDINGS:    Gray-white differentiation is maintained without hemorrhage, midline  shift, or mass effect.    The ventricles and cisterns are maintained.    Calvarium is intact. Visualized sinuses are clear.    IMPRESSION:    No acute intracranial abnormality.        Electronically Signed by Leonel Yancey on 10/21/2020 11:45 AM                             X-Ray Chest PA And Lateral (Final result)  Result time 10/21/20 11:10:40    Final result by Angeles Rodriguez MD (10/21/20 11:10:40)                 Narrative:    Two-view chest x-ray    CLINICAL HISTORY: Chest Pain    The lungs are clear. The cardiomediastinal silhouette is normal in  size. There are no osseous abnormalities. The patient has had prior  cervical  fusion.    IMPRESSION: No acute pulmonary process.    Electronically Signed by Angeles Rodriguez M.D. on 10/21/2020 11:14 AM                               Medical Decision Making:   Differential Diagnosis:   59-year-old male presented emergency department with right-sided weakness and slurred speech and inability to speak 2 days ago which lasted about 15 minutes and then resolved  .  Patient also started having chest tightness which started today so presented here.  Patient's presentation consistent with transient ischemic attack.  Patient noted to be hypertensive.  Nitroglycerin and aspirin given for chest pain.  Screening cardiac workup unremarkable.  Hospital Medicine consulted for evaluation for admission.  Neurology consulted for evaluation for TIA workup and neurologist evaluated the patient in the emergency department.  Hospital Medicine to evaluate for admission  Clinical Tests:   Lab Tests: Reviewed  Radiological Study: Reviewed  Medical Tests: Reviewed                             Clinical Impression:       ICD-10-CM ICD-9-CM   1. Transient ischemic attack  G45.9 435.9   2. Chest pain  R07.9 786.50                          ED Disposition Condition    Admit                             Kalin Nguyen MD  10/21/20 1234

## 2020-10-21 NOTE — HPI
Mr. Colón is a 59 year old male with a history of HTN, CAD, BLANKA, and obesity who presents today with complaints of rt arm numbness 2 days ago. It is moderate. It was associated with expressive aphasia and slurred speech. He denies facial asymmetry, gait abnormality, N/V/D, or LOC. He also reports chest pain with onset today. It is substernal, doesn't radiate, and nonexertional. He reports Saturday he wasn't feeling like himself, was tired, slept in, dizzy, and had a decreased appetite. Sunday he felt better. Monday the episode of garbled and slurred speech with rt arm numbness occurred while driving home. It lasted a few minutes. Today, he went to his PCP who sent him to the ED for further work up. He had a negative CT of his head, and Dr. Dru Baker was consulted per ER MD. He is out of the window for tPA at this point since it has been 48 hours. In 2016, he had a full cardiac work up that included a cath which reports nonobstructive CAD. He was lost to follow up with his cardiologist and hasn't seen anyone since then. He is currently chest pain free.

## 2020-10-22 VITALS
TEMPERATURE: 99 F | RESPIRATION RATE: 19 BRPM | BODY MASS INDEX: 40.43 KG/M2 | DIASTOLIC BLOOD PRESSURE: 79 MMHG | HEIGHT: 74 IN | OXYGEN SATURATION: 95 % | SYSTOLIC BLOOD PRESSURE: 161 MMHG | HEART RATE: 61 BPM | WEIGHT: 315 LBS

## 2020-10-22 LAB
ALBUMIN SERPL BCP-MCNC: 3.6 G/DL (ref 3.5–5.2)
ALP SERPL-CCNC: 62 U/L (ref 55–135)
ALT SERPL W/O P-5'-P-CCNC: 21 U/L (ref 10–44)
ANION GAP SERPL CALC-SCNC: 11 MMOL/L (ref 8–16)
AST SERPL-CCNC: 20 U/L (ref 10–40)
BASOPHILS # BLD AUTO: 0.04 K/UL (ref 0–0.2)
BASOPHILS NFR BLD: 0.7 % (ref 0–1.9)
BILIRUB SERPL-MCNC: 0.8 MG/DL (ref 0.1–1)
BUN SERPL-MCNC: 18 MG/DL (ref 6–20)
CALCIUM SERPL-MCNC: 9 MG/DL (ref 8.7–10.5)
CHLORIDE SERPL-SCNC: 101 MMOL/L (ref 95–110)
CO2 SERPL-SCNC: 24 MMOL/L (ref 23–29)
CREAT SERPL-MCNC: 1.2 MG/DL (ref 0.5–1.4)
DIFFERENTIAL METHOD: ABNORMAL
EOSINOPHIL # BLD AUTO: 0.2 K/UL (ref 0–0.5)
EOSINOPHIL NFR BLD: 3.2 % (ref 0–8)
ERYTHROCYTE [DISTWIDTH] IN BLOOD BY AUTOMATED COUNT: 12.5 % (ref 11.5–14.5)
EST. GFR  (AFRICAN AMERICAN): >60 ML/MIN/1.73 M^2
EST. GFR  (NON AFRICAN AMERICAN): >60 ML/MIN/1.73 M^2
GLUCOSE SERPL-MCNC: 103 MG/DL (ref 70–110)
HCT VFR BLD AUTO: 41.9 % (ref 40–54)
HGB BLD-MCNC: 14.4 G/DL (ref 14–18)
IMM GRANULOCYTES # BLD AUTO: 0.01 K/UL (ref 0–0.04)
IMM GRANULOCYTES NFR BLD AUTO: 0.2 % (ref 0–0.5)
LYMPHOCYTES # BLD AUTO: 1.5 K/UL (ref 1–4.8)
LYMPHOCYTES NFR BLD: 26.8 % (ref 18–48)
MAGNESIUM SERPL-MCNC: 1.9 MG/DL (ref 1.6–2.6)
MCH RBC QN AUTO: 30.2 PG (ref 27–31)
MCHC RBC AUTO-ENTMCNC: 34.4 G/DL (ref 32–36)
MCV RBC AUTO: 88 FL (ref 82–98)
MONOCYTES # BLD AUTO: 0.6 K/UL (ref 0.3–1)
MONOCYTES NFR BLD: 10.8 % (ref 4–15)
NEUTROPHILS # BLD AUTO: 3.3 K/UL (ref 1.8–7.7)
NEUTROPHILS NFR BLD: 58.3 % (ref 38–73)
NRBC BLD-RTO: 0 /100 WBC
PHOSPHATE SERPL-MCNC: 4.2 MG/DL (ref 2.7–4.5)
PLATELET # BLD AUTO: 312 K/UL (ref 150–350)
PMV BLD AUTO: 8.9 FL (ref 9.2–12.9)
POTASSIUM SERPL-SCNC: 4.3 MMOL/L (ref 3.5–5.1)
PROT SERPL-MCNC: 6.2 G/DL (ref 6–8.4)
RBC # BLD AUTO: 4.77 M/UL (ref 4.6–6.2)
SODIUM SERPL-SCNC: 136 MMOL/L (ref 136–145)
WBC # BLD AUTO: 5.63 K/UL (ref 3.9–12.7)

## 2020-10-22 PROCEDURE — 83735 ASSAY OF MAGNESIUM: CPT

## 2020-10-22 PROCEDURE — 36415 COLL VENOUS BLD VENIPUNCTURE: CPT

## 2020-10-22 PROCEDURE — 25000003 PHARM REV CODE 250: Performed by: NURSE PRACTITIONER

## 2020-10-22 PROCEDURE — 80053 COMPREHEN METABOLIC PANEL: CPT

## 2020-10-22 PROCEDURE — 84100 ASSAY OF PHOSPHORUS: CPT

## 2020-10-22 PROCEDURE — G0378 HOSPITAL OBSERVATION PER HR: HCPCS

## 2020-10-22 PROCEDURE — 85025 COMPLETE CBC W/AUTO DIFF WBC: CPT

## 2020-10-22 PROCEDURE — 25000003 PHARM REV CODE 250: Performed by: INTERNAL MEDICINE

## 2020-10-22 RX ORDER — ASPIRIN 325 MG
325 TABLET ORAL DAILY
Status: DISCONTINUED | OUTPATIENT
Start: 2020-10-22 | End: 2020-10-22 | Stop reason: HOSPADM

## 2020-10-22 RX ORDER — NAPROXEN SODIUM 220 MG/1
81 TABLET, FILM COATED ORAL DAILY
Qty: 360 TABLET | Refills: 0 | Status: SHIPPED | OUTPATIENT
Start: 2020-10-22 | End: 2021-03-09 | Stop reason: SDUPTHER

## 2020-10-22 RX ORDER — LOSARTAN POTASSIUM AND HYDROCHLOROTHIAZIDE 25; 100 MG/1; MG/1
1 TABLET ORAL DAILY
Qty: 90 TABLET | Refills: 0 | Status: SHIPPED | OUTPATIENT
Start: 2020-10-22 | End: 2020-11-02 | Stop reason: ALTCHOICE

## 2020-10-22 RX ORDER — ATORVASTATIN CALCIUM 40 MG/1
40 TABLET, FILM COATED ORAL DAILY
Qty: 90 TABLET | Refills: 3 | Status: SHIPPED | OUTPATIENT
Start: 2020-10-23 | End: 2020-11-02 | Stop reason: SDUPTHER

## 2020-10-22 RX ADMIN — ASPIRIN 325 MG ORAL TABLET 325 MG: 325 PILL ORAL at 11:10

## 2020-10-22 RX ADMIN — ATORVASTATIN CALCIUM 40 MG: 40 TABLET, FILM COATED ORAL at 09:10

## 2020-10-22 RX ADMIN — SENNOSIDES AND DOCUSATE SODIUM 1 TABLET: 8.6; 5 TABLET ORAL at 09:10

## 2020-10-22 RX ADMIN — LOSARTAN POTASSIUM 100 MG: 50 TABLET, FILM COATED ORAL at 09:10

## 2020-10-22 NOTE — PLAN OF CARE
Problem: Adult Inpatient Plan of Care  Goal: Plan of Care Review  Outcome: Ongoing, Progressing  Goal: Patient-Specific Goal (Individualization)  Outcome: Ongoing, Progressing  Goal: Absence of Hospital-Acquired Illness or Injury  Outcome: Ongoing, Progressing  Goal: Optimal Comfort and Wellbeing  Outcome: Ongoing, Progressing  Goal: Readiness for Transition of Care  Outcome: Ongoing, Progressing  Goal: Rounds/Family Conference  Outcome: Ongoing, Progressing     Problem: Bariatric Environmental Safety  Goal: Safety Maintained with Care  Outcome: Ongoing, Progressing     Problem: Infection  Goal: Infection Symptom Resolution  Outcome: Ongoing, Progressing     Problem: Adjustment to Illness (Stroke, Ischemic/Transient Ischemic Attack)  Goal: Optimal Coping  Outcome: Ongoing, Progressing     Problem: Bowel Elimination Impaired (Stroke, Ischemic/Transient Ischemic Attack)  Goal: Effective Bowel Elimination  Outcome: Ongoing, Progressing     Problem: Cerebral Tissue Perfusion Risk (Stroke, Ischemic/Transient Ischemic Attack)  Goal: Optimal Cerebral Tissue Perfusion  Outcome: Ongoing, Progressing     Problem: Communication Impairment (Stroke, Ischemic/Transient Ischemic Attack)  Goal: Improved Communication Skills  Outcome: Ongoing, Progressing     Problem: Eating/Swallowing Impairment (Stroke, Ischemic/Transient Ischemic Attack)  Goal: Oral Intake without Aspiration  Outcome: Ongoing, Progressing     Problem: Functional Ability Impaired (Stroke, Ischemic/Transient Ischemic Attack)  Goal: Optimal Functional Ability  Outcome: Ongoing, Progressing     Problem: Hemodynamic Instability (Stroke, Ischemic/Transient Ischemic Attack)  Goal: Vital Signs Remain in Desired Range  Outcome: Ongoing, Progressing     Problem: Pain (Stroke, Ischemic/Transient Ischemic Attack)  Goal: Acceptable Pain Control  Outcome: Ongoing, Progressing     Problem: Sensorimotor Impairment (Stroke, Ischemic/Transient Ischemic Attack)  Goal: Improved  Sensorimotor Function  Outcome: Ongoing, Progressing     Problem: Urinary Elimination Impaired (Stroke, Ischemic/Transient Ischemic Attack)  Goal: Effective Urinary Elimination  Outcome: Ongoing, Progressing     Problem: Electrolyte Imbalance (Acute Kidney Injury/Impairment)  Goal: Serum Electrolyte Balance  Outcome: Ongoing, Progressing     Problem: Fluid Imbalance (Acute Kidney Injury/Impairment)  Goal: Optimal Fluid Balance  Outcome: Ongoing, Progressing     Problem: Hematologic Alteration (Acute Kidney Injury/Impairment)  Goal: Hemoglobin, Hematocrit and Platelets Within Normal Range  Outcome: Ongoing, Progressing     Problem: Oral Intake Inadequate (Acute Kidney Injury/Impairment)  Goal: Optimal Nutrition Intake  Outcome: Ongoing, Progressing     Problem: Renal Function Impairment (Acute Kidney Injury/Impairment)  Goal: Effective Renal Function  Outcome: Ongoing, Progressing     Problem: Fall Injury Risk  Goal: Absence of Fall and Fall-Related Injury  Outcome: Ongoing, Progressing

## 2020-10-22 NOTE — PLAN OF CARE
Patient verified address as: [address] [address]  Phone Number:[phone]  PCP- Eric Mercado NP  Pharmacy- [facility]  Insurance- none - Radha has contacted   Home Health-None  Dialysis- None       10/22/20 1251   Discharge Assessment   Assessment Type Discharge Planning Assessment   Confirmed/corrected address and phone number on facesheet? Yes   Assessment information obtained from? Patient   Prior to hospitilization cognitive status: Alert/Oriented   Prior to hospitalization functional status: Independent   Current cognitive status: Alert/Oriented   Current Functional Status: Independent   Lives With spouse   Able to Return to Prior Arrangements yes   Is patient able to care for self after discharge? Yes   Patient currently being followed by outpatient case management? No   Patient currently receives any other outside agency services? No   Equipment Currently Used at Home none   Do you have any problems affording any of your prescribed medications? No   Is the patient taking medications as prescribed? yes   Does the patient have transportation home? Yes   Transportation Anticipated family or friend will provide   Dialysis Name and Scheduled days none   Does the patient receive services at the [facility]? No   Discharge Plan A Home   Discharge Plan B Home   DME Needed Upon Discharge  none

## 2020-10-22 NOTE — PROGRESS NOTES
Patient wheeled off unit to personal vehicle. IV and tele removed. Patient verbalized understanding for discharge teaching.

## 2020-10-22 NOTE — PROGRESS NOTES
[facility]  Neurology  Progress Note    Patient Name: Otis Colón  MRN: [id number]  Admission Date: 10/21/2020  Hospital Length of Stay: 0 days  Code Status: Full Code   Attending Provider: No att. providers found   Consulting Provider: Sylvia Aguayo NP  Primary Care Physician: Eric Mercado NP  Principal Problem:Transient ischemic attack    Consults  Subjective:     Chief Complaint:     Headache    Chest Pain         HPI: per EMR: 59-year-old male presented emergency department with complaints of having a right upper extremity weakness and numbness and slurred speech which started 2 days ago and symptoms spontaneously resolved about 15 minutes later after that episode.  Patient went to PCP this morning and was sent here for admission.  Patient denies nausea or vomiting or shortness of breath.  Patient however complains of substernal chest pain which he describes as tightness which is intermittent and comes and goes.  Patient denies any fever or chills or nausea or vomiting or abdominal pain.  Denies any weakness or numbness at this time.  Speech is back to normal at this time.    INTERVAL HISTORY: Patient seen and examined with Dr. Dru Baker this morning in ED, wife at bedside. Patient states he still feels unwell and is c/o left substernal pain.     Brain imaging   CT head w/o contrast   IMPRESSION:     No acute intracranial abnormality.    MRI/A brain wo contrast pending     Neck imaging   CUS pending     Heart imaging   TTE w bubble pending     CRT: 1.3  LDL: 149.6  Hgb A1c: 5.1    Patient is not a tPA candidate as he presented to ED outside of tPA window  NIHSS 0    10/22:  Patient seen and examined with Dr. Dru Baker.  Patient is alert oriented.  Wife at bedside.  Patient is back to baseline with no complaints.  He has no weakness and denies chest pain.  NIH Stroke Scale:    Level of Consciousness: 0 - alert  LOC Questions: 0 - answers both correctly  LOC Commands: 0 - performs both  correctly  Best Gaze: 0 - normal  Visual: 0 - no visual loss  Facial Palsy: 0 - normal  Motor Left Arm: 0 - no drift  Motor Right Arm: 0 - no drift  Motor Left Le - no drift    Limb Ataxia: 0 - absent  Sensory: 0 - normal  Best Language: 0 - no aphasia  Dysarthria: 0 - normal articulation  Extinction and Inattention: 0 - no neglect        Past Medical History:   Diagnosis Date    CAD (coronary artery disease) 2017    Cervical spinal stenosis     Hypertension     Migraine headache        Past Surgical History:   Procedure Laterality Date    APPENDECTOMY      CERVICAL FUSION  2009    CROSS FINGER FLAP      FRACTURE SURGERY      steel librado in right leg    KNEE ARTHROSCOPY Right     ORTHOPEDIC SURGERY Right     librado from knee to ankle    RHIZOTOMY  2016       Review of patient's allergies indicates:  No Known Allergies    Current Neurological Medications:     No current facility-administered medications on file prior to encounter.      Current Outpatient Medications on File Prior to Encounter   Medication Sig    [DISCONTINUED] aspirin 81 MG Chew Take 4 tablets (324 mg total) by mouth once daily. (Patient taking differently: Take 162 mg by mouth once daily. )    [DISCONTINUED] losartan-hydrochlorothiazide 100-25 mg (HYZAAR) 100-25 mg per tablet Take 1 tablet by mouth once daily.    [DISCONTINUED] naproxen sodium (ANAPROX) 220 MG tablet Take 220 mg by mouth every 12 (twelve) hours.      Family History     Problem Relation (Age of Onset)    Hypertension Mother    No Known Problems Sister, Brother        Tobacco Use    Smoking status: Never Smoker    Smokeless tobacco: Never Used   Substance and Sexual Activity    Alcohol use: No    Drug use: No    Sexual activity: Not on file     Review of Systems   Constitutional: Negative.    HENT: Negative.    Eyes: Negative.    Respiratory: Negative.    Gastrointestinal: Negative.    Musculoskeletal: Negative.    Neurological: Negative.    Hematological:  Negative.         Objective:     Vital Signs (Most Recent):  Temp: 98.5 °F (36.9 °C) (10/22/20 1149)  Pulse: 61 (10/22/20 1149)  Resp: 19 (10/22/20 1149)  BP: (!) 161/79 (10/22/20 1149)  SpO2: 95 % (10/22/20 1149) Vital Signs (24h Range):  Temp:  [98.5 °F (36.9 °C)-98.9 °F (37.2 °C)] 98.5 °F (36.9 °C)  Pulse:  [52-63] 61  Resp:  [19-20] 19  SpO2:  [94 %-96 %] 95 %  BP: (104-164)/(53-84) 161/79     Weight: (!) 147.1 kg (324 lb 4.8 oz)  Body mass index is 41.64 kg/m².    Physical Exam  Vitals signs and nursing note reviewed.   HENT:      Head: Normocephalic and atraumatic.      Mouth/Throat:      Mouth: Mucous membranes are moist.      Pharynx: Oropharynx is clear.   Eyes:      Extraocular Movements: Extraocular movements intact and EOM normal.      Pupils: Pupils are equal, round, and reactive to light.   Neck:      Musculoskeletal: Normal range of motion.   Cardiovascular:      Rate and Rhythm: Normal rate.   Pulmonary:      Effort: Pulmonary effort is normal.   Musculoskeletal: Normal range of motion.   Skin:     General: Skin is warm and dry.   Neurological:      Mental Status: He is alert and oriented to person, place, and time.      Coordination: Finger-Nose-Finger Test normal.   Psychiatric:         Speech: Speech normal.         NEUROLOGICAL EXAMINATION:     MENTAL STATUS   Oriented to person, place, and time.   Follows 3 step commands.   Attention: normal. Concentration: normal.   Speech: speech is normal   Level of consciousness: alert  Normal comprehension.     CRANIAL NERVES     CN III, IV, VI   Pupils are equal, round, and reactive to light.  Extraocular motions are normal.   Right pupil: Size: 4 mm. Shape: regular. Reactivity: brisk.   Left pupil: Size: 4 mm. Shape: regular. Reactivity: brisk.     CN VII   Facial expression full, symmetric.     SENSORY EXAM   Light touch normal.     GAIT AND COORDINATION      Coordination   Finger to nose coordination: normal    Tremor   Resting tremor:  absent      Significant Labs:   Hemoglobin A1c:   Recent Labs   Lab 10/21/20  1141   HGBA1C 5.1     BMP:   Recent Labs   Lab 10/21/20  1141 10/22/20  0402   GLU 98 103    136   K 3.9 4.3    101   CO2 26 24   BUN 10 18   CREATININE 1.3 1.2   CALCIUM 9.1 9.0   MG 1.9 1.9     CBC:   Recent Labs   Lab 10/21/20  1141 10/22/20  0403   WBC 5.76 5.63   HGB 15.0 14.4   HCT 43.2 41.9    312     CMP:   Recent Labs   Lab 10/21/20  1141 10/22/20  0402   GLU 98 103    136   K 3.9 4.3    101   CO2 26 24   BUN 10 18   CREATININE 1.3 1.2   CALCIUM 9.1 9.0   MG 1.9 1.9   PROT 7.1 6.2   ALBUMIN 3.9 3.6   BILITOT 1.0 0.8   ALKPHOS 63 62   AST 19 20   ALT 22 21   ANIONGAP 11 11   EGFRNONAA 59.3* >60.0     All pertinent lab results from the past 24 hours have been reviewed.    Significant Imaging: I have reviewed all pertinent imaging results/findings within the past 24 hours.    Assessment and Plan:  This is a 60 yo male with a PMH of CAD, hypertension, cervical spine stenosis and migraine presenting to ED with symptoms of right upper extremity weakness and numbness and slurred speech which started 2 days ago and symptoms spontaneously resolved about 15 minutes later. Neurology was consulted to evaluate symptoms and perform stroke sherman. On exam patient states neuro symptoms had resolved but he was still c/o chest pain.     1. R/O TIA / CVA   -CTH negative for acute bleed  -MRI/A: negative acute   TTE w bubble: ef 75%, Mild LAE, negative bubble  CUS: negative  -Continue asa and statin for stroke prevention   -PT/OT/ST evaluations   -Maintain safety precautions   -lipids:219 chol 149 ldl    2. Hypertension   -IM following, home meds ordered    3. Hyperlipidemia   -LDL elevated, continue statin therapy for LDL goal <70.     Plan: Pt take 325 mg asa at home. Rec continue the asa for CAD and start high intensity statin for stroke prevention. Follow up outpatient with cardiology for Mild LAE to evaluate risks of  afib. Rec outpatient EEG and no driving until follow up.         [phone] 3 days from discharge.     Stroke education was provided including stroke risk factors modification and any acute neurological changes including weakness, confusion, visual changes to come straight to the ER.     All questions were answered.                                   Active Diagnoses:    Diagnosis Date Noted POA    PRINCIPAL PROBLEM:  Transient ischemic attack [G45.9] 10/21/2020 Yes    BLANKA (obstructive sleep apnea) [G47.33] 01/13/2017 Yes    Essential hypertension [I10] 11/04/2016 Yes    Obesity, morbid, BMI 40.0-49.9 [E66.01] 11/04/2016 Yes      Problems Resolved During this Admission:    Diagnosis Date Noted Date Resolved POA    Acute chest pain [R07.9] 11/04/2016 10/22/2020 Yes       VTE Risk Mitigation (From admission, onward)         Ordered     IP VTE HIGH RISK PATIENT  Once      10/21/20 1246     Place sequential compression device  Until discontinued      10/21/20 1246                Thank you for your consult. Sylvia Aguayo NP  Neurology  [facility]

## 2020-10-23 NOTE — HOSPITAL COURSE
59 year old male with known non-obstructive CAD and morbid obesity admitted for TIA/CVA work-up after presenting with transient right arm weakness and slurred speech that occurred 2 days prior to admission. Seen by neurology. Work-up for CVA unremarkable for acute infarct. LDL not at goal; started on atorvastatin 40 mg qhs. Advised to restart aspirin 81 mg daily.    Though advised aspirin and HTN meds by his cardiologist, he has not been taking them for almost an year. Counseled on weight loss, medication and dietary compliance. Advised to f/u with neurology outpatient.    Instructions provided to follow up with primary care physician as outpatient. Patient verbalized understanding and is aware to contact primary care physician or return to ED if new or worsening symptoms.    Physical exam on the day of discharge:  General: Patient resting comfortably in no acute distress.  Lungs: CTA. Good air entry.  Cor: Regular rate and rhythm. No murmurs. No pedal edema.  Abd: Soft. Nontender. Non-distended.  Neuro: Alert and oriented x3.  Speech is clear and coherent.  Cranial nerves 2-12 are grossly intact. Motor strength is 5/5 in all extremities.  Sensory exam unremarkable.  No dysmetria or dysdiadochokinesia.    Ext: Clubbing noted. No cyanosis.

## 2020-10-23 NOTE — DISCHARGE SUMMARY
[facility] Medicine  Discharge Summary      Patient Name: Otsi Colón  MRN: [id number]  Admission Date: 10/21/2020  Hospital Length of Stay: 0 days  Discharge Date and Time: 10/22/2020  1:15 PM  Attending Physician: No att. providers found   Discharging Provider: Apollo Vera MD  Primary Care Provider: Eric Mercado NP      HPI:   Mr. Colón is a 59 year old male with a history of HTN, CAD, BLANKA, and obesity who presents today with complaints of rt arm numbness 2 days ago. It is moderate. It was associated with expressive aphasia and slurred speech. He denies facial asymmetry, gait abnormality, N/V/D, or LOC. He also reports chest pain with onset today. It is substernal, doesn't radiate, and nonexertional. He reports Saturday he wasn't feeling like himself, was tired, slept in, dizzy, and had a decreased appetite. Sunday he felt better. Monday the episode of garbled and slurred speech with rt arm numbness occurred while driving home. It lasted a few minutes. Today, he went to his PCP who sent him to the ED for further work up. He had a negative CT of his head, and Dr. Dru Baker was consulted per ER MD. He is out of the window for tPA at this point since it has been 48 hours. In 2016, he had a full cardiac work up that included a cath which reports nonobstructive CAD. He was lost to follow up with his cardiologist and hasn't seen anyone since then. He is currently chest pain free.      * No surgery found *      Hospital Course:   59 year old male with known non-obstructive CAD and morbid obesity admitted for TIA/CVA work-up after presenting with transient right arm weakness and slurred speech that occurred 2 days prior to admission. Seen by neurology. Work-up for CVA unremarkable for acute infarct. LDL not at goal; started on atorvastatin 40 mg qhs. Advised to restart aspirin 81 mg daily.    Though advised aspirin and HTN meds by his cardiologist, he has not been taking them for  almost an year. Counseled on weight loss, medication and dietary compliance. Advised to f/u with neurology outpatient.    Instructions provided to follow up with primary care physician as outpatient. Patient verbalized understanding and is aware to contact primary care physician or return to ED if new or worsening symptoms.    Physical exam on the day of discharge:  General: Patient resting comfortably in no acute distress.  Lungs: CTA. Good air entry.  Cor: Regular rate and rhythm. No murmurs. No pedal edema.  Abd: Soft. Nontender. Non-distended.  Neuro: Alert and oriented x3.  Speech is clear and coherent.  Cranial nerves 2-12 are grossly intact. Motor strength is 5/5 in all extremities.  Sensory exam unremarkable.  No dysmetria or dysdiadochokinesia.    Ext: Clubbing noted. No cyanosis.        Consults:     No new Assessment & Plan notes have been filed under this hospital service since the last note was generated.  Service: Hospital Medicine    Final Active Diagnoses:    Diagnosis Date Noted POA    PRINCIPAL PROBLEM:  Transient ischemic attack [G45.9] 10/21/2020 Yes    BLANKA (obstructive sleep apnea) [G47.33] 01/13/2017 Yes    Essential hypertension [I10] 11/04/2016 Yes    Obesity, morbid, BMI 40.0-49.9 [E66.01] 11/04/2016 Yes      Problems Resolved During this Admission:    Diagnosis Date Noted Date Resolved POA    Acute chest pain [R07.9] 11/04/2016 10/22/2020 Yes       Discharged Condition: fair    Disposition: Home or Self Care    Follow Up:  Follow-up Information     Eric Mercado NP In 2 weeks.    Specialty: Family Medicine  Why: As needed  Contact information:  [address]  [place] [address]  [phone]             Mateusz Baker MD. Schedule an appointment as soon as possible for a visit in 2 weeks.    Specialties: Vascular Neurology, Neurology  Why: Neurology follow-up   Contact information:  [address]  [address]  [place] [address]  [phone]                 Patient Instructions:       Diet Cardiac     Notify your health care provider if you experience any of the following:  temperature >100.4     Notify your health care provider if you experience any of the following:  persistent nausea and vomiting or diarrhea     Notify your health care provider if you experience any of the following:  persistent dizziness, light-headedness, or visual disturbances     Notify your health care provider if you experience any of the following:  increased confusion or weakness     Activity as tolerated       Significant Diagnostic Studies: Labs:   CMP   Recent Labs   Lab 10/21/20  1141 10/22/20  0402    136   K 3.9 4.3    101   CO2 26 24   GLU 98 103   BUN 10 18   CREATININE 1.3 1.2   CALCIUM 9.1 9.0   PROT 7.1 6.2   ALBUMIN 3.9 3.6   BILITOT 1.0 0.8   ALKPHOS 63 62   AST 19 20   ALT 22 21   ANIONGAP 11 11   ESTGFRAFRICA >60.0 >60.0   EGFRNONAA 59.3* >60.0   , CBC   Recent Labs   Lab 10/21/20  1141 10/22/20  0403   WBC 5.76 5.63   HGB 15.0 14.4   HCT 43.2 41.9    312   , Lipid Panel   Lab Results   Component Value Date    CHOL 219 (H) 10/21/2020    HDL 41 10/21/2020    LDLCALC 149.6 10/21/2020    TRIG 142 10/21/2020    CHOLHDL 18.7 (L) 10/21/2020   , Troponin   Recent Labs   Lab 10/21/20  1141 10/21/20  1416 10/21/20  1932   TROPONINI <0.030 <0.030 <0.030    and A1C:   Recent Labs   Lab 10/21/20  1141   HGBA1C 5.1     Radiology:     10/21/20 05:05 PM MRI BRAIN WITHOUT CONTRAST Final [id number] [facility]   10/21/20 04:55 PM MRA Brain Final [id number] [facility]   10/21/20 01:34 PM US Carotid Bilateral Final [id number] [facility]   10/21/20 11:33 AM CT Head Without Contrast Final [id number] [facility]   10/21/20 11:05 AM X-Ray Chest PA And Lateral Final [id number] [facility]   10/21/20 04:32 PM Echo Color Flow Doppler? Yes; Bubble Contrast? Yes Final [id number]       Medications:  Reconciled Home Medications:      Medication List      START taking these medications    atorvastatin 40 MG tablet  Commonly known as: LIPITOR  Take 1  tablet (40 mg total) by mouth once daily.        CHANGE how you take these medications    aspirin 81 MG Chew  Take 1 tablet (81 mg total) by mouth once daily.  What changed: how much to take        CONTINUE taking these medications    losartan-hydrochlorothiazide 100-25 mg 100-25 mg per tablet  Commonly known as: HYZAAR  Take 1 tablet by mouth once daily.        STOP taking these medications    naproxen sodium 220 MG tablet  Commonly known as: ANAPROX            Indwelling Lines/Drains at time of discharge:   Lines/Drains/Airways     None                 Time spent on the discharge of patient: 35 minutes  Patient was seen and examined on the date of discharge and determined to be suitable for discharge.         Apollo Vera MD  Department of Hospital Medicine  [facility]

## 2020-11-02 ENCOUNTER — OFFICE VISIT (OUTPATIENT)
Dept: FAMILY MEDICINE | Facility: CLINIC | Age: 60
End: 2020-11-02
Payer: MEDICAID

## 2020-11-02 VITALS
SYSTOLIC BLOOD PRESSURE: 132 MMHG | BODY MASS INDEX: 40.43 KG/M2 | HEIGHT: 74 IN | WEIGHT: 315 LBS | HEART RATE: 65 BPM | TEMPERATURE: 97 F | OXYGEN SATURATION: 98 % | DIASTOLIC BLOOD PRESSURE: 90 MMHG

## 2020-11-02 DIAGNOSIS — I10 ESSENTIAL HYPERTENSION: ICD-10-CM

## 2020-11-02 DIAGNOSIS — Z82.49 FAMILY HISTORY OF EARLY CAD: ICD-10-CM

## 2020-11-02 DIAGNOSIS — L71.9 ROSACEA: ICD-10-CM

## 2020-11-02 DIAGNOSIS — K21.9 GASTROESOPHAGEAL REFLUX DISEASE, UNSPECIFIED WHETHER ESOPHAGITIS PRESENT: ICD-10-CM

## 2020-11-02 DIAGNOSIS — E78.00 PURE HYPERCHOLESTEROLEMIA: ICD-10-CM

## 2020-11-02 DIAGNOSIS — G45.9 TIA (TRANSIENT ISCHEMIC ATTACK): Primary | ICD-10-CM

## 2020-11-02 PROCEDURE — 99214 OFFICE O/P EST MOD 30 MIN: CPT | Mod: S$GLB,,, | Performed by: NURSE PRACTITIONER

## 2020-11-02 PROCEDURE — 99214 PR OFFICE/OUTPT VISIT, EST, LEVL IV, 30-39 MIN: ICD-10-PCS | Mod: S$GLB,,, | Performed by: NURSE PRACTITIONER

## 2020-11-02 RX ORDER — AMLODIPINE BESYLATE 5 MG/1
5 TABLET ORAL DAILY
Qty: 30 TABLET | Refills: 1 | Status: SHIPPED | OUTPATIENT
Start: 2020-11-02 | End: 2020-12-04 | Stop reason: SDUPTHER

## 2020-11-02 RX ORDER — LOSARTAN POTASSIUM AND HYDROCHLOROTHIAZIDE 12.5; 5 MG/1; MG/1
1 TABLET ORAL DAILY
Qty: 30 TABLET | Refills: 3 | Status: SHIPPED | OUTPATIENT
Start: 2020-11-02 | End: 2021-02-28

## 2020-11-02 RX ORDER — ATORVASTATIN CALCIUM 40 MG/1
20 TABLET, FILM COATED ORAL DAILY
Qty: 15 TABLET | Refills: 0
Start: 2020-11-02 | End: 2020-12-04

## 2020-11-02 RX ORDER — DOXYCYCLINE 100 MG/1
100 CAPSULE ORAL DAILY
Qty: 30 CAPSULE | Refills: 1 | Status: SHIPPED | OUTPATIENT
Start: 2020-11-02 | End: 2020-12-04

## 2020-11-02 RX ORDER — OMEPRAZOLE 40 MG/1
40 CAPSULE, DELAYED RELEASE ORAL DAILY
Qty: 30 CAPSULE | Refills: 1 | Status: SHIPPED | OUTPATIENT
Start: 2020-11-02 | End: 2020-12-31

## 2020-11-02 NOTE — PROGRESS NOTES
SUBJECTIVE:      Patient ID: Otis Colón is a 59 y.o. male.    Chief Complaint: Hospital Follow Up (TIA)    Hospital f/u for TIA and chest pain. His work up was unremarkable. He has seen Dr Henriquez since discharge, he has ordered an EEG. He is to f/u with cardiology. He is feeling good today. Says the medication is making him feel sluggish. He has a history of rosacea previously followed by derm, asking for a refill on doxycycline      Hypertension  This is a chronic problem. The current episode started more than 1 year ago. Associated symptoms include malaise/fatigue. Pertinent negatives include no anxiety, chest pain, headaches, palpitations, peripheral edema or shortness of breath. Risk factors for coronary artery disease include obesity, male gender and dyslipidemia. Past treatments include diuretics and angiotensin blockers.   Hyperlipidemia  This is a chronic problem. The current episode started more than 1 year ago. Recent lipid tests were reviewed and are high. Pertinent negatives include no chest pain or shortness of breath. Current antihyperlipidemic treatment includes statins. Risk factors for coronary artery disease include hypertension, male sex, obesity and dyslipidemia.       Past Surgical History:   Procedure Laterality Date    APPENDECTOMY      CERVICAL FUSION  2009    CROSS FINGER FLAP      FRACTURE SURGERY      steel librado in right leg    KNEE ARTHROSCOPY Right     ORTHOPEDIC SURGERY Right     librado from knee to ankle    RHIZOTOMY  08/2016     Family History   Problem Relation Age of Onset    Hypertension Mother     No Known Problems Sister     No Known Problems Brother       Social History     Socioeconomic History    Marital status:      Spouse name: Not on file    Number of children: Not on file    Years of education: Not on file    Highest education level: Not on file   Occupational History    Not on file   Social Needs    Financial resource strain: Not on file     Food insecurity     Worry: Not on file     Inability: Not on file    Transportation needs     Medical: Not on file     Non-medical: Not on file   Tobacco Use    Smoking status: Never Smoker    Smokeless tobacco: Never Used   Substance and Sexual Activity    Alcohol use: No    Drug use: No    Sexual activity: Yes     Partners: Female   Lifestyle    Physical activity     Days per week: Not on file     Minutes per session: Not on file    Stress: Not on file   Relationships    Social connections     Talks on phone: Not on file     Gets together: Not on file     Attends Ten[employer] service: Not on file     Active member of club or organization: Not on file     Attends meetings of clubs or organizations: Not on file     Relationship status: Not on file   Other Topics Concern    Not on file   Social History Narrative    Not on file     Current Outpatient Medications   Medication Sig Dispense Refill    aspirin 81 MG Chew Take 1 tablet (81 mg total) by mouth once daily. 360 tablet 0    atorvastatin (LIPITOR) 40 MG tablet Take 0.5 tablets (20 mg total) by mouth once daily. 15 tablet 0    amLODIPine (NORVASC) 5 MG tablet Take 1 tablet (5 mg total) by mouth once daily. 30 tablet 1    doxycycline (VIBRAMYCIN) 100 MG Cap Take 1 capsule (100 mg total) by mouth once daily. 30 capsule 1    losartan-hydrochlorothiazide 50-12.5 mg (HYZAAR) 50-12.5 mg per tablet Take 1 tablet by mouth once daily. 30 tablet 3    omeprazole (PRILOSEC) 40 MG capsule Take 1 capsule (40 mg total) by mouth once daily. 30 capsule 1     No current facility-administered medications for this visit.      Review of patient's allergies indicates:  No Known Allergies   Past Medical History:   Diagnosis Date    CAD (coronary artery disease) 1/13/2017    Cervical spinal stenosis     Hyperlipidemia     Hypertension     Migraine headache      Past Surgical History:   Procedure Laterality Date    APPENDECTOMY      CERVICAL FUSION  2009    CROSS  "FINGER FLAP      FRACTURE SURGERY      steel librado in right leg    KNEE ARTHROSCOPY Right     ORTHOPEDIC SURGERY Right     librado from knee to ankle    RHIZOTOMY  08/2016       Review of Systems   Constitutional: Positive for malaise/fatigue. Negative for appetite change, chills, diaphoresis and unexpected weight change.   HENT: Negative for ear discharge, facial swelling, hearing loss, nosebleeds and trouble swallowing.    Eyes: Negative for photophobia, pain and visual disturbance.   Respiratory: Negative for apnea, choking, shortness of breath and wheezing.    Cardiovascular: Negative for chest pain and palpitations.   Gastrointestinal: Negative for abdominal pain, blood in stool and vomiting.   Endocrine: Negative for polyphagia.   Genitourinary: Negative for difficulty urinating and hematuria.   Musculoskeletal: Negative for gait problem and joint swelling.   Skin: Negative for pallor.   Neurological: Negative for dizziness, seizures, speech difficulty, weakness, light-headedness and headaches.   Hematological: Does not bruise/bleed easily.   Psychiatric/Behavioral: Negative for agitation, confusion and dysphoric mood. The patient is not nervous/anxious.       OBJECTIVE:      Vitals:    11/02/20 0825 11/02/20 0857   BP: (!) 130/90 (!) 132/90   Pulse: 65    Temp: 97.3 °F (36.3 °C)    TempSrc: Skin    SpO2: 98%    Weight: (!) 147.9 kg (326 lb)    Height: 6' 2" (1.88 m)      Physical Exam  Vitals signs and nursing note reviewed.   Constitutional:       General: He is not in acute distress.     Appearance: He is well-developed.   HENT:      Head: Normocephalic and atraumatic.      Nose: Nose normal.      Mouth/Throat:      Pharynx: Uvula midline.   Eyes:      General: Lids are normal.      Conjunctiva/sclera: Conjunctivae normal.      Pupils: Pupils are equal, round, and reactive to light.      Right eye: Pupil is round and reactive.      Left eye: Pupil is round and reactive.   Neck:      Musculoskeletal: Normal " range of motion and neck supple.      Thyroid: No thyromegaly.      Vascular: No carotid bruit.   Cardiovascular:      Rate and Rhythm: Normal rate and regular rhythm.      Pulses: Normal pulses.      Heart sounds: Normal heart sounds. No murmur.   Pulmonary:      Effort: Pulmonary effort is normal.      Breath sounds: Normal breath sounds.   Abdominal:      General: Bowel sounds are normal.      Palpations: Abdomen is soft. Abdomen is not rigid.      Tenderness: There is no abdominal tenderness.   Musculoskeletal: Normal range of motion.   Lymphadenopathy:      Cervical: No cervical adenopathy.   Skin:     General: Skin is warm and dry.      Nails: There is no clubbing.     Neurological:      Mental Status: He is alert and oriented to person, place, and time.   Psychiatric:         Mood and Affect: Mood normal.         Speech: Speech normal.         Behavior: Behavior normal. Behavior is cooperative.         Thought Content: Thought content normal.         Admission on 10/21/2020, Discharged on 10/22/2020   Component Date Value Ref Range Status    Magnesium 10/21/2020 1.9  1.6 - 2.6 mg/dL Final    SARS-CoV-2 RNA, Amplification, Qual 10/21/2020 Negative  Negative Final    Comment: This test utilizes isothermal nucleic acid amplification   technology to detect the SARS-CoV-2 RdRp nucleic acid segment.   The analytical sensitivity (limit of detection) is 125 genome   equivalents/mL.   A POSITIVE result implies infection with the SARS-CoV-2 virus;  the patient is presumed to be contagious.    A NEGATIVE result means that SARS-CoV-2 nucleic acids are not  present above the limit of detection. A NEGATIVE result should be   treated as presumptive. It does not rule out the possibility of   COVID-19 and should not be the sole basis for treatment decisions.   If COVID-19 is strongly suspected based on clinical and exposure   history, re-testing using an alternate molecular assay should be   considered.   This test is only  for use under the Food and Drug   Administration s Emergency Use Authorization (EUA).   Commercial kits are provided by [employer].   Performance characteristics of the EUA have been independently  verified by [facility] Department o                           f  Pathology and Laboratory Medicine.   _________________________________________________________________  The ID NOW COVID-19 Letter of Authorization, along with the   authorized Fact Sheet for Healthcare Providers, the authorized Fact  Sheet for Patients, and authorized labeling are available on the FDA   website:  www.fda.gov/MedicalDevices/Safety/EmergencySituations/wyd811938.htm      WBC 10/21/2020 5.76  3.90 - 12.70 K/uL Final    RBC 10/21/2020 4.91  4.60 - 6.20 M/uL Final    Hemoglobin 10/21/2020 15.0  14.0 - 18.0 g/dL Final    Hematocrit 10/21/2020 43.2  40.0 - 54.0 % Final    MCV 10/21/2020 88  82 - 98 fL Final    MCH 10/21/2020 30.5  27.0 - 31.0 pg Final    MCHC 10/21/2020 34.7  32.0 - 36.0 g/dL Final    RDW 10/21/2020 12.5  11.5 - 14.5 % Final    Platelets 10/21/2020 315  150 - 350 K/uL Final    MPV 10/21/2020 8.6* 9.2 - 12.9 fL Final    Immature Granulocytes 10/21/2020 0.0  0.0 - 0.5 % Final    Gran # (ANC) 10/21/2020 3.4  1.8 - 7.7 K/uL Final    Immature Grans (Abs) 10/21/2020 0.00  0.00 - 0.04 K/uL Final    Comment: Mild elevation in immature granulocytes is non specific and   can be seen in a variety of conditions including stress response,   acute inflammation, trauma and pregnancy. Correlation with other   laboratory and clinical findings is essential.      Lymph # 10/21/2020 1.7  1.0 - 4.8 K/uL Final    Mono # 10/21/2020 0.5  0.3 - 1.0 K/uL Final    Eos # 10/21/2020 0.2  0.0 - 0.5 K/uL Final    Baso # 10/21/2020 0.04  0.00 - 0.20 K/uL Final    nRBC 10/21/2020 0  0 /100 WBC Final    Gran % 10/21/2020 58.8  38.0 - 73.0 % Final    Lymph % 10/21/2020 28.8  18.0 - 48.0 % Final    Mono % 10/21/2020 8.7  4.0 -  15.0 % Final    Eosinophil % 10/21/2020 3.0  0.0 - 8.0 % Final    Basophil % 10/21/2020 0.7  0.0 - 1.9 % Final    Differential Method 10/21/2020 Automated   Final    Sodium 10/21/2020 137  136 - 145 mmol/L Final    Potassium 10/21/2020 3.9  3.5 - 5.1 mmol/L Final    Chloride 10/21/2020 100  95 - 110 mmol/L Final    CO2 10/21/2020 26  23 - 29 mmol/L Final    Glucose 10/21/2020 98  70 - 110 mg/dL Final    BUN 10/21/2020 10  6 - 20 mg/dL Final    Creatinine 10/21/2020 1.3  0.5 - 1.4 mg/dL Final    Calcium 10/21/2020 9.1  8.7 - 10.5 mg/dL Final    Total Protein 10/21/2020 7.1  6.0 - 8.4 g/dL Final    Albumin 10/21/2020 3.9  3.5 - 5.2 g/dL Final    Total Bilirubin 10/21/2020 1.0  0.1 - 1.0 mg/dL Final    Comment: For infants and newborns, interpretation of results should be based  on gestational age, weight and in agreement with clinical  observations.  Premature Infant recommended reference ranges:  Up to 24 hours.............<8.0 mg/dL  Up to 48 hours............<12.0 mg/dL  3-5 days..................<15.0 mg/dL  6-29 days.................<15.0 mg/dL      Alkaline Phosphatase 10/21/2020 63  55 - 135 U/L Final    AST 10/21/2020 19  10 - 40 U/L Final    ALT 10/21/2020 22  10 - 44 U/L Final    Anion Gap 10/21/2020 11  8 - 16 mmol/L Final    eGFR if African American 10/21/2020 >60.0  >60 mL/min/1.73 m^2 Final    eGFR if non African American 10/21/2020 59.3* >60 mL/min/1.73 m^2 Final    Comment: Calculation used to obtain the estimated glomerular filtration  rate (eGFR) is the CKD-EPI equation.       Troponin I 10/21/2020 <0.030  <=0.040 ng/mL Final    Troponin I 10/21/2020 <0.030  <=0.040 ng/mL Final    BNP 10/21/2020 37  0 - 99 pg/mL Final    Values of less than 100 pg/ml are consistent with non-CHF populations.    Specimen UA 10/21/2020 Urine, Clean Catch   Final    Color, UA 10/21/2020 Yellow  Yellow, Straw, Radha Final    Appearance, UA 10/21/2020 Clear  Clear Final    pH, UA 10/21/2020 8.0   5.0 - 8.0 Final    Specific [place], UA 10/21/2020 1.010  1.005 - 1.030 Final    Protein, UA 10/21/2020 Negative  Negative Final    Comment: Recommend a 24 hour urine protein or a urine   protein/creatinine ratio if globulin induced proteinuria is  clinically suspected.      Glucose, UA 10/21/2020 Negative  Negative Final    Ketones, UA 10/21/2020 Negative  Negative Final    Bilirubin (UA) 10/21/2020 Negative  Negative Final    Occult Blood UA 10/21/2020 Negative  Negative Final    Nitrite, UA 10/21/2020 Negative  Negative Final    Urobilinogen, UA 10/21/2020 Negative  Negative EU/dL Final    Leukocytes, UA 10/21/2020 Negative  Negative Final    BSA 10/21/2020 2.77  m2 Final    TDI SEPTAL 10/21/2020 0.08  m/s Final    LV LATERAL E/E' RATIO 10/21/2020 4.88  m/s Final    LV SEPTAL E/E' RATIO 10/21/2020 4.88  m/s Final    Right Atrial Pressure (from IVC) 10/21/2020 3  mmHg Final    AORTIC VALVE CUSP SEPERATION 10/21/2020 2.35  cm Final    TDI LATERAL 10/21/2020 0.08  m/s Final    PV PEAK VELOCITY 10/21/2020 81.89  cm/s Final    LVIDd 10/21/2020 5.41  3.5 - 6.0 cm Final    IVS 10/21/2020 1.25* 0.6 - 1.1 cm Final    Posterior Wall 10/21/2020 1.25* 0.6 - 1.1 cm Final    Ao root annulus 10/21/2020 3.91  cm Final    LVIDs 10/21/2020 3.25  2.1 - 4.0 cm Final    FS 10/21/2020 40  28 - 44 % Final    LV mass 10/21/2020 280.63  g Final    LA size 10/21/2020 4.25  cm Final    RVDD 10/21/2020 367.00  cm Final    Left Ventricle Relative Wall Thick* 10/21/2020 0.46  cm Final    AV mean gradient 10/21/2020 2  mmHg Final    AV valve area 10/21/2020 2.92  cm2 Final    AV Velocity Ratio 10/21/2020 81.48   Final    AV index (prosthetic) 10/21/2020 0.92   Final    E/A ratio 10/21/2020 0.65   Final    Mean e' 10/21/2020 0.08  m/s Final    E wave decelartion time 10/21/2020 331.41  msec Final    IVRT 10/21/2020 91.17  msec Final    LVOT diameter 10/21/2020 2.01  cm Final    LVOT area 10/21/2020 3.2  cm2  Final    LVOT peak calin 10/21/2020 73.33  m/s Final    LVOT peak VTI 10/21/2020 14.91  cm Final    Ao peak calin 10/21/2020 0.90  m/s Final    Ao VTI 10/21/2020 16.21  cm Final    LVOT stroke volume 10/21/2020 47.29  cm3 Final    AV peak gradient 10/21/2020 3  mmHg Final    E/E' ratio 10/21/2020 4.88  m/s Final    MV Peak E Calin 10/21/2020 0.39  m/s Final    MV Peak A Calin 10/21/2020 0.60  m/s Final    LV Mass Index 10/21/2020 105  g/m2 Final    Hemoglobin A1C 10/21/2020 5.1  4.5 - 6.2 % Final    Comment: According to ADA guidelines, hemoglobin A1C <7.0% represents  optimal control in non-pregnant diabetic patients.  Different  metrics may apply to specific populations.   Standards of Medical Care in Diabetes - 2016.  For the purpose of screening for the presence of diabetes:  <5.7%     Consistent with the absence of diabetes  5.7-6.4%  Consistent with increasing risk for diabetes   (prediabetes)  >or=6.5%  Consistent with diabetes  Currently no consensus exists for use of hemoglobin A1C  for diagnosis of diabetes for children.      Estimated Avg Glucose 10/21/2020 100  68 - 131 mg/dL Final    Cholesterol 10/21/2020 219* 120 - 199 mg/dL Final    Comment: The National Cholesterol Education Program (NCEP) has set the  following guidelines (reference ranges) for Cholesterol:  Optimal.....................<200 mg/dL  Borderline High.............200-239 mg/dL  High........................> or = 240 mg/dL      Triglycerides 10/21/2020 142  30 - 150 mg/dL Final    Comment: The National Cholesterol Education Program (NCEP) has set the  following guidelines (reference values) for triglycerides:  Normal......................<150 mg/dL  Borderline High.............150-199 mg/dL  High........................200-499 mg/dL      HDL 10/21/2020 41  40 - 75 mg/dL Final    Comment: The National Cholesterol Education Program (NCEP) has set the  following guidelines (reference values) for HDL  Cholesterol:  Low...............<40 mg/dL  Optimal...........>60 mg/dL      LDL Cholesterol 10/21/2020 149.6  63.0 - 159.0 mg/dL Final    Comment: The National Cholesterol Education Program (NCEP) has set the  following guidelines (reference values) for LDL Cholesterol:  Optimal.......................<130 mg/dL  Borderline High...............130-159 mg/dL  High..........................160-189 mg/dL  Very High.....................>190 mg/dL      HDL/Cholesterol Ratio 10/21/2020 18.7* 20.0 - 50.0 % Final    Total Cholesterol/HDL Ratio 10/21/2020 5.3* 2.0 - 5.0 Final    Non-HDL Cholesterol 10/21/2020 178  mg/dL Final    Comment: Risk category and Non-HDL cholesterol goals:  Coronary heart disease (CHD)or equivalent (10-year risk of CHD >20%):  Non-HDL cholesterol goal     <130 mg/dL  Two or more CHD risk factors and 10-year risk of CHD <= 20%:  Non-HDL cholesterol goal     <160 mg/dL  0 to 1 CHD risk factor:  Non-HDL cholesterol goal     <190 mg/dL      TSH 10/21/2020 2.190  0.340 - 5.600 uIU/mL Final    Troponin I 10/21/2020 <0.030  <=0.040 ng/mL Final    Sodium 10/22/2020 136  136 - 145 mmol/L Final    Potassium 10/22/2020 4.3  3.5 - 5.1 mmol/L Final    Chloride 10/22/2020 101  95 - 110 mmol/L Final    CO2 10/22/2020 24  23 - 29 mmol/L Final    Glucose 10/22/2020 103  70 - 110 mg/dL Final    BUN 10/22/2020 18  6 - 20 mg/dL Final    Creatinine 10/22/2020 1.2  0.5 - 1.4 mg/dL Final    Calcium 10/22/2020 9.0  8.7 - 10.5 mg/dL Final    Total Protein 10/22/2020 6.2  6.0 - 8.4 g/dL Final    Albumin 10/22/2020 3.6  3.5 - 5.2 g/dL Final    Total Bilirubin 10/22/2020 0.8  0.1 - 1.0 mg/dL Final    Comment: For infants and newborns, interpretation of results should be based  on gestational age, weight and in agreement with clinical  observations.  Premature Infant recommended reference ranges:  Up to 24 hours.............<8.0 mg/dL  Up to 48 hours............<12.0 mg/dL  3-5 days..................<15.0  mg/dL  6-29 days.................<15.0 mg/dL      Alkaline Phosphatase 10/22/2020 62  55 - 135 U/L Final    AST 10/22/2020 20  10 - 40 U/L Final    ALT 10/22/2020 21  10 - 44 U/L Final    Anion Gap 10/22/2020 11  8 - 16 mmol/L Final    eGFR if African American 10/22/2020 >60.0  >60 mL/min/1.73 m^2 Final    eGFR if non African American 10/22/2020 >60.0  >60 mL/min/1.73 m^2 Final    Comment: Calculation used to obtain the estimated glomerular filtration  rate (eGFR) is the CKD-EPI equation.       Magnesium 10/22/2020 1.9  1.6 - 2.6 mg/dL Final    Phosphorus 10/22/2020 4.2  2.7 - 4.5 mg/dL Final    WBC 10/22/2020 5.63  3.90 - 12.70 K/uL Final    RBC 10/22/2020 4.77  4.60 - 6.20 M/uL Final    Hemoglobin 10/22/2020 14.4  14.0 - 18.0 g/dL Final    Hematocrit 10/22/2020 41.9  40.0 - 54.0 % Final    MCV 10/22/2020 88  82 - 98 fL Final    MCH 10/22/2020 30.2  27.0 - 31.0 pg Final    MCHC 10/22/2020 34.4  32.0 - 36.0 g/dL Final    RDW 10/22/2020 12.5  11.5 - 14.5 % Final    Platelets 10/22/2020 312  150 - 350 K/uL Final    MPV 10/22/2020 8.9* 9.2 - 12.9 fL Final    Immature Granulocytes 10/22/2020 0.2  0.0 - 0.5 % Final    Gran # (ANC) 10/22/2020 3.3  1.8 - 7.7 K/uL Final    Immature Grans (Abs) 10/22/2020 0.01  0.00 - 0.04 K/uL Final    Comment: Mild elevation in immature granulocytes is non specific and   can be seen in a variety of conditions including stress response,   acute inflammation, trauma and pregnancy. Correlation with other   laboratory and clinical findings is essential.      Lymph # 10/22/2020 1.5  1.0 - 4.8 K/uL Final    Mono # 10/22/2020 0.6  0.3 - 1.0 K/uL Final    Eos # 10/22/2020 0.2  0.0 - 0.5 K/uL Final    Baso # 10/22/2020 0.04  0.00 - 0.20 K/uL Final    nRBC 10/22/2020 0  0 /100 WBC Final    Gran % 10/22/2020 58.3  38.0 - 73.0 % Final    Lymph % 10/22/2020 26.8  18.0 - 48.0 % Final    Mono % 10/22/2020 10.8  4.0 - 15.0 % Final    Eosinophil % 10/22/2020 3.2  0.0 - 8.0 %  Final    Basophil % 10/22/2020 0.7  0.0 - 1.9 % Final    Differential Method 10/22/2020 Automated   Final   ]  Assessment:       1. TIA (transient ischemic attack)    2. Essential hypertension    3. Pure hypercholesterolemia    4. Gastroesophageal reflux disease, unspecified whether esophagitis present    5. Family history of early CAD    6. Rosacea        Plan:       TIA (transient ischemic attack)  F/u with neurology as scheduled    Essential hypertension  -  start   amLODIPine (NORVASC) 5 MG tablet; Take 1 tablet (5 mg total) by mouth once daily.  Dispense: 30 tablet; Refill: 1  -  decrease   losartan-hydrochlorothiazide 50-12.5 mg (HYZAAR) 50-12.5 mg per tablet; Take 1 tablet by mouth once daily.  Dispense: 30 tablet; Refill: 3    Pure hypercholesterolemia  -   decrease  atorvastatin (LIPITOR) 40 MG tablet; Take 0.5 tablets (20 mg total) by mouth once daily.  Dispense: 15 tablet; Refill: 0    Gastroesophageal reflux disease, unspecified whether esophagitis present  -   start  omeprazole (PRILOSEC) 40 MG capsule; Take 1 capsule (40 mg total) by mouth once daily.  Dispense: 30 capsule; Refill: 1    Family history of early CAD  -     Ambulatory referral/consult to Cardiology; Future; Expected date: 11/09/2020    Rosacea  - refill doxycycline (VIBRAMYCIN) 100 MG Cap; Take 1 capsule (100 mg total) by mouth once daily.  Dispense: 30 capsule; Refill: 1        Follow up in about 4 weeks (around 11/30/2020) for htn .      11/2/2020 PHILLIP Rivera, FNP

## 2020-11-02 NOTE — PATIENT INSTRUCTIONS
4 Steps for Eating Healthier  Changing the way you eat can improve your health. It can lower your cholesterol and blood pressure, and help you stay at a healthy weight. Your diet doesnt have to be bland and boring to be healthy. Just watch your calories and follow these steps:    1. Eat fewer unhealthy fats  · Choose more fish and lean meats instead of fatty cuts of meat.  · Skip butter and lard, and use less margarine.  · Pass on foods that have palm, coconut, or hydrogenated oils.  · Eat fewer high-fat dairy foods like cheese, ice cream, and whole milk.  · Get a heart-healthy cookbook and try some low-fat recipes.  2. Go light on salt  · Keep the saltshaker off the table.  · Limit high-salt ingredients, such as soy sauce, bouillon, and garlic salt.  · Instead of adding salt when cooking, season your food with herbs and flavorings. Try lemon, garlic, and onion.  · Limit convenience foods, such as boxed or canned foods and restaurant food.  · Read food labels and choose lower-sodium options.  3. Limit sugar  · Pause before you add sugars to pancakes, cereal, coffee, or tea. This includes white and brown table sugar, syrup, honey, and molasses. Cut your usual amount by half.  · Use non-sugar sweeteners. Stevia, aspartame, and sucralose can satisfy a sweet tooth without adding calories.  · Swap out sugar-filled soda and other drinks. Buy sugar-free or low-calorie beverages. Remember water is always the best choice.  · Read labels and choose foods with less added sugar. Keep in mind that dairy foods and foods with fruit will have some natural sugar.  · Cut the sugar in recipes by 1/3 to 1/2. Boost the flavor with extracts like almond, vanilla, or orange. Or add spices such as cinnamon or nutmeg.  4. Eat more fiber  · Eat fresh fruits and vegetables every day.  · Boost your diet with whole grains. Go for oats, whole-grain rice, and bran.  · Add beans and lentils to your meals.  · Drink more water to match your fiber  increase. This is to help prevent constipation.  Date Last Reviewed: 5/11/2015  © 1887-2612 [employer], [employer]. [address] [place] [address], [place] [address]. All rights reserved. This information is not intended as a substitute for professional medical care. Always follow your healthcare professional's instructions.

## 2020-12-04 ENCOUNTER — OFFICE VISIT (OUTPATIENT)
Dept: FAMILY MEDICINE | Facility: CLINIC | Age: 60
End: 2020-12-04
Payer: MEDICAID

## 2020-12-04 VITALS
TEMPERATURE: 98 F | WEIGHT: 315 LBS | BODY MASS INDEX: 40.43 KG/M2 | SYSTOLIC BLOOD PRESSURE: 110 MMHG | HEIGHT: 74 IN | HEART RATE: 80 BPM | OXYGEN SATURATION: 95 % | DIASTOLIC BLOOD PRESSURE: 82 MMHG

## 2020-12-04 DIAGNOSIS — L71.9 ROSACEA: ICD-10-CM

## 2020-12-04 DIAGNOSIS — E78.00 PURE HYPERCHOLESTEROLEMIA: ICD-10-CM

## 2020-12-04 DIAGNOSIS — Z11.59 NEED FOR HEPATITIS C SCREENING TEST: ICD-10-CM

## 2020-12-04 DIAGNOSIS — I10 ESSENTIAL HYPERTENSION: Primary | ICD-10-CM

## 2020-12-04 PROCEDURE — 99214 OFFICE O/P EST MOD 30 MIN: CPT | Mod: S$GLB,,, | Performed by: NURSE PRACTITIONER

## 2020-12-04 PROCEDURE — 99214 PR OFFICE/OUTPT VISIT, EST, LEVL IV, 30-39 MIN: ICD-10-PCS | Mod: S$GLB,,, | Performed by: NURSE PRACTITIONER

## 2020-12-04 RX ORDER — ASPIRIN 81 MG/1
81 TABLET ORAL DAILY
Qty: 90 TABLET | Refills: 1 | Status: SHIPPED | OUTPATIENT
Start: 2020-12-04 | End: 2021-08-19

## 2020-12-04 RX ORDER — UBIQUINOL 100 MG
1 CAPSULE ORAL DAILY
Qty: 90 CAPSULE | Refills: 0 | Status: SHIPPED | OUTPATIENT
Start: 2020-12-04 | End: 2022-12-05 | Stop reason: CLARIF

## 2020-12-04 RX ORDER — ROSUVASTATIN CALCIUM 10 MG/1
10 TABLET, COATED ORAL NIGHTLY
Qty: 90 TABLET | Refills: 0 | Status: SHIPPED | OUTPATIENT
Start: 2020-12-04 | End: 2021-03-09 | Stop reason: SDUPTHER

## 2020-12-04 RX ORDER — DOXYCYCLINE 100 MG/1
100 CAPSULE ORAL DAILY
Qty: 30 CAPSULE | Refills: 2 | Status: SHIPPED | OUTPATIENT
Start: 2020-12-04 | End: 2021-03-09

## 2020-12-04 RX ORDER — ASPIRIN 81 MG/1
1 TABLET ORAL DAILY
COMMUNITY
End: 2020-12-04 | Stop reason: SDUPTHER

## 2020-12-04 RX ORDER — AMLODIPINE BESYLATE 5 MG/1
5 TABLET ORAL DAILY
Qty: 90 TABLET | Refills: 1 | Status: SHIPPED | OUTPATIENT
Start: 2020-12-04 | End: 2020-12-31

## 2020-12-04 NOTE — PROGRESS NOTES
SUBJECTIVE:      Patient ID: Otis Colón is a 60 y.o. male.    Chief Complaint: Hypertension    Mr Colón is here today to f/u on htn and hyperlipidemia. He did not tolerate the lipitor, made him achy and fatigued.  He has seen Dr Henriquez, EEG was wnl and he was told he can drive. He did see Dr Thibodeaux with cardiology and is scheduled for a loop recorder and REJI. He is feeling good today. No complaints. Will be traveling to [place] for he holidays    Hypertension  This is a chronic problem. The current episode started more than 1 year ago. Associated symptoms include malaise/fatigue. Pertinent negatives include no anxiety, chest pain, headaches, palpitations, peripheral edema or shortness of breath. Risk factors for coronary artery disease include obesity, male gender and dyslipidemia. Past treatments include diuretics and angiotensin blockers.   Hyperlipidemia  This is a chronic problem. The current episode started more than 1 year ago. Recent lipid tests were reviewed and are high. Pertinent negatives include no chest pain or shortness of breath. Current antihyperlipidemic treatment includes statins. Risk factors for coronary artery disease include hypertension, male sex, obesity and dyslipidemia.       Past Surgical History:   Procedure Laterality Date    APPENDECTOMY      CERVICAL FUSION  2009    CROSS FINGER FLAP      FRACTURE SURGERY      steel librado in right leg    KNEE ARTHROSCOPY Right     ORTHOPEDIC SURGERY Right     librado from knee to ankle    RHIZOTOMY  08/2016     Family History   Problem Relation Age of Onset    Hypertension Mother     No Known Problems Sister     No Known Problems Brother       Social History     Socioeconomic History    Marital status:      Spouse name: Not on file    Number of children: Not on file    Years of education: Not on file    Highest education level: Not on file   Occupational History    Not on file   Social Needs    Financial  resource strain: Not on file    Food insecurity     Worry: Not on file     Inability: Not on file    Transportation needs     Medical: Not on file     Non-medical: Not on file   Tobacco Use    Smoking status: Never Smoker    Smokeless tobacco: Never Used   Substance and Sexual Activity    Alcohol use: No    Drug use: No    Sexual activity: Yes     Partners: Female   Lifestyle    Physical activity     Days per week: Not on file     Minutes per session: Not on file    Stress: Not on file   Relationships    Social connections     Talks on phone: Not on file     Gets together: Not on file     Attends Orthodox service: Not on file     Active member of club or organization: Not on file     Attends meetings of clubs or organizations: Not on file     Relationship status: Not on file   Other Topics Concern    Not on file   Social History Narrative    Not on file     Current Outpatient Medications   Medication Sig Dispense Refill    amLODIPine (NORVASC) 5 MG tablet Take 1 tablet (5 mg total) by mouth once daily. 90 tablet 1    aspirin (ECOTRIN) 81 MG EC tablet Take 1 tablet (81 mg total) by mouth once daily. 90 tablet 1    aspirin 81 MG Chew Take 1 tablet (81 mg total) by mouth once daily. 360 tablet 0    losartan-hydrochlorothiazide 50-12.5 mg (HYZAAR) 50-12.5 mg per tablet Take 1 tablet by mouth once daily. 30 tablet 3    omeprazole (PRILOSEC) 40 MG capsule Take 1 capsule (40 mg total) by mouth once daily. 30 capsule 1    coQ10, ubiquinol, 100 mg Cap Take 1 capsule by mouth once daily. 90 capsule 0    doxycycline (VIBRAMYCIN) 100 MG Cap Take 1 capsule (100 mg total) by mouth once daily. 30 capsule 2    rosuvastatin (CRESTOR) 10 MG tablet Take 1 tablet (10 mg total) by mouth every evening. 90 tablet 0     No current facility-administered medications for this visit.      Review of patient's allergies indicates:  No Known Allergies   Past Medical History:   Diagnosis Date    CAD (coronary artery disease)  "1/13/2017    Cervical spinal stenosis     Hyperlipidemia     Hypertension     Migraine headache      Past Surgical History:   Procedure Laterality Date    APPENDECTOMY      CERVICAL FUSION  2009    CROSS FINGER FLAP      FRACTURE SURGERY      steel librado in right leg    KNEE ARTHROSCOPY Right     ORTHOPEDIC SURGERY Right     librado from knee to ankle    RHIZOTOMY  08/2016       Review of Systems   Constitutional: Positive for malaise/fatigue. Negative for appetite change, chills, diaphoresis and unexpected weight change.   HENT: Negative for ear discharge, facial swelling, hearing loss, nosebleeds and trouble swallowing.    Eyes: Negative for photophobia, pain and visual disturbance.   Respiratory: Negative for apnea, choking, shortness of breath and wheezing.    Cardiovascular: Negative for chest pain and palpitations.   Gastrointestinal: Negative for abdominal pain, blood in stool and vomiting.   Endocrine: Negative for polyphagia.   Genitourinary: Negative for difficulty urinating and hematuria.   Musculoskeletal: Negative for gait problem and joint swelling.   Skin: Negative for pallor.   Neurological: Negative for dizziness, seizures, speech difficulty, weakness and headaches.   Hematological: Does not bruise/bleed easily.   Psychiatric/Behavioral: Negative for agitation, confusion and dysphoric mood.      OBJECTIVE:      Vitals:    12/04/20 0933   BP: 110/82   Pulse: 80   Temp: 97.5 °F (36.4 °C)   TempSrc: Skin   SpO2: 95%   Weight: (!) 146.1 kg (322 lb)   Height: 6' 2" (1.88 m)     Physical Exam  Vitals signs and nursing note reviewed.   Constitutional:       Appearance: He is well-developed.   HENT:      Head: Atraumatic.   Eyes:      Conjunctiva/sclera: Conjunctivae normal.   Neck:      Musculoskeletal: Neck supple.   Cardiovascular:      Rate and Rhythm: Normal rate and regular rhythm.      Pulses: Normal pulses.      Heart sounds: Normal heart sounds. No murmur.   Pulmonary:      Effort: Pulmonary " effort is normal.      Breath sounds: Normal breath sounds.   Abdominal:      General: Bowel sounds are normal. There is no distension.      Palpations: Abdomen is soft.   Musculoskeletal: Normal range of motion.   Skin:     General: Skin is warm and dry.   Neurological:      Mental Status: He is alert and oriented to person, place, and time.   Psychiatric:         Mood and Affect: Mood normal.         Behavior: Behavior normal.        Assessment:       1. Essential hypertension    2. Pure hypercholesterolemia    3. Need for hepatitis C screening test    4. Rosacea        Plan:       Essential hypertension  -     aspirin (ECOTRIN) 81 MG EC tablet; Take 1 tablet (81 mg total) by mouth once daily.  Dispense: 90 tablet; Refill: 1  -     Comprehensive Metabolic Panel; Future; Expected date: 12/18/2020  -     amLODIPine (NORVASC) 5 MG tablet; Take 1 tablet (5 mg total) by mouth once daily.  Dispense: 90 tablet; Refill: 1    Pure hypercholesterolemia  -  start   rosuvastatin (CRESTOR) 10 MG tablet; Take 1 tablet (10 mg total) by mouth every evening.  Dispense: 90 tablet; Refill: 0  -     coQ10, ubiquinol, 100 mg Cap; Take 1 capsule by mouth once daily.  Dispense: 90 capsule; Refill: 0  -     aspirin (ECOTRIN) 81 MG EC tablet; Take 1 tablet (81 mg total) by mouth once daily.  Dispense: 90 tablet; Refill: 1  -     Comprehensive Metabolic Panel; Future; Expected date: 12/18/2020  -     Lipid Panel; Future; Expected date: 12/18/2020  Stop lipitor    Need for hepatitis C screening test  -     Hepatitis C antibody; Future; Expected date: 12/04/2020    Rosacea  -     doxycycline (VIBRAMYCIN) 100 MG Cap; Take 1 capsule (100 mg total) by mouth once daily.  Dispense: 30 capsule; Refill: 2        Follow up in about 3 months (around 3/4/2021) for hyperlipidemia .      12/4/2020 PHILLIP Rivera, FNP

## 2020-12-04 NOTE — PATIENT INSTRUCTIONS
4 Steps for Eating Healthier  Changing the way you eat can improve your health. It can lower your cholesterol and blood pressure, and help you stay at a healthy weight. Your diet doesnt have to be bland and boring to be healthy. Just watch your calories and follow these steps:    1. Eat fewer unhealthy fats  · Choose more fish and lean meats instead of fatty cuts of meat.  · Skip butter and lard, and use less margarine.  · Pass on foods that have palm, coconut, or hydrogenated oils.  · Eat fewer high-fat dairy foods like cheese, ice cream, and whole milk.  · Get a heart-healthy cookbook and try some low-fat recipes.  2. Go light on salt  · Keep the saltshaker off the table.  · Limit high-salt ingredients, such as soy sauce, bouillon, and garlic salt.  · Instead of adding salt when cooking, season your food with herbs and flavorings. Try lemon, garlic, and onion.  · Limit convenience foods, such as boxed or canned foods and restaurant food.  · Read food labels and choose lower-sodium options.  3. Limit sugar  · Pause before you add sugars to pancakes, cereal, coffee, or tea. This includes white and brown table sugar, syrup, honey, and molasses. Cut your usual amount by half.  · Use non-sugar sweeteners. Stevia, aspartame, and sucralose can satisfy a sweet tooth without adding calories.  · Swap out sugar-filled soda and other drinks. Buy sugar-free or low-calorie beverages. Remember water is always the best choice.  · Read labels and choose foods with less added sugar. Keep in mind that dairy foods and foods with fruit will have some natural sugar.  · Cut the sugar in recipes by 1/3 to 1/2. Boost the flavor with extracts like almond, vanilla, or orange. Or add spices such as cinnamon or nutmeg.  4. Eat more fiber  · Eat fresh fruits and vegetables every day.  · Boost your diet with whole grains. Go for oats, whole-grain rice, and bran.  · Add beans and lentils to your meals.  · Drink more water to match your fiber  increase. This is to help prevent constipation.  Date Last Reviewed: 5/11/2015  © 1043-8227 [employer] [address] [place] [address], [place] [address]. All rights reserved. This information is not intended as a substitute for professional medical care. Always follow your healthcare professional's instructions.

## 2021-03-03 ENCOUNTER — TELEPHONE (OUTPATIENT)
Dept: FAMILY MEDICINE | Facility: CLINIC | Age: 61
End: 2021-03-03

## 2021-03-04 ENCOUNTER — LAB VISIT (OUTPATIENT)
Dept: LAB | Facility: HOSPITAL | Age: 61
End: 2021-03-04
Attending: NURSE PRACTITIONER
Payer: MEDICAID

## 2021-03-04 DIAGNOSIS — I10 ESSENTIAL HYPERTENSION: ICD-10-CM

## 2021-03-04 DIAGNOSIS — Z11.59 NEED FOR HEPATITIS C SCREENING TEST: ICD-10-CM

## 2021-03-04 DIAGNOSIS — E78.00 PURE HYPERCHOLESTEROLEMIA: ICD-10-CM

## 2021-03-04 LAB
ALBUMIN SERPL BCP-MCNC: 4 G/DL (ref 3.5–5.2)
ALP SERPL-CCNC: 65 U/L (ref 55–135)
ALT SERPL W/O P-5'-P-CCNC: 23 U/L (ref 10–44)
ANION GAP SERPL CALC-SCNC: 8 MMOL/L (ref 8–16)
AST SERPL-CCNC: 20 U/L (ref 10–40)
BILIRUB SERPL-MCNC: 0.9 MG/DL (ref 0.1–1)
BUN SERPL-MCNC: 14 MG/DL (ref 8–23)
CALCIUM SERPL-MCNC: 8.8 MG/DL (ref 8.7–10.5)
CHLORIDE SERPL-SCNC: 106 MMOL/L (ref 95–110)
CHOLEST SERPL-MCNC: 131 MG/DL (ref 120–199)
CHOLEST/HDLC SERPL: 2.8 {RATIO} (ref 2–5)
CO2 SERPL-SCNC: 23 MMOL/L (ref 23–29)
CREAT SERPL-MCNC: 1.1 MG/DL (ref 0.5–1.4)
EST. GFR  (AFRICAN AMERICAN): >60 ML/MIN/1.73 M^2
EST. GFR  (NON AFRICAN AMERICAN): >60 ML/MIN/1.73 M^2
GLUCOSE SERPL-MCNC: 102 MG/DL (ref 70–110)
HDLC SERPL-MCNC: 47 MG/DL (ref 40–75)
HDLC SERPL: 35.9 % (ref 20–50)
LDLC SERPL CALC-MCNC: 67.8 MG/DL (ref 63–159)
NONHDLC SERPL-MCNC: 84 MG/DL
POTASSIUM SERPL-SCNC: 3.4 MMOL/L (ref 3.5–5.1)
PROT SERPL-MCNC: 7.3 G/DL (ref 6–8.4)
SODIUM SERPL-SCNC: 137 MMOL/L (ref 136–145)
TRIGL SERPL-MCNC: 81 MG/DL (ref 30–150)

## 2021-03-04 PROCEDURE — 86803 HEPATITIS C AB TEST: CPT | Performed by: NURSE PRACTITIONER

## 2021-03-04 PROCEDURE — 80053 COMPREHEN METABOLIC PANEL: CPT | Performed by: NURSE PRACTITIONER

## 2021-03-04 PROCEDURE — 80061 LIPID PANEL: CPT | Performed by: NURSE PRACTITIONER

## 2021-03-05 ENCOUNTER — PATIENT MESSAGE (OUTPATIENT)
Dept: FAMILY MEDICINE | Facility: CLINIC | Age: 61
End: 2021-03-05

## 2021-03-06 LAB — HCV AB S/CO SERPL IA: <0.1 S/CO RATIO (ref 0–0.9)

## 2021-03-09 ENCOUNTER — OFFICE VISIT (OUTPATIENT)
Dept: FAMILY MEDICINE | Facility: CLINIC | Age: 61
End: 2021-03-09
Payer: MEDICAID

## 2021-03-09 VITALS
HEART RATE: 85 BPM | TEMPERATURE: 98 F | DIASTOLIC BLOOD PRESSURE: 82 MMHG | OXYGEN SATURATION: 95 % | WEIGHT: 315 LBS | HEIGHT: 74 IN | BODY MASS INDEX: 40.43 KG/M2 | SYSTOLIC BLOOD PRESSURE: 116 MMHG

## 2021-03-09 DIAGNOSIS — K21.9 GASTROESOPHAGEAL REFLUX DISEASE, UNSPECIFIED WHETHER ESOPHAGITIS PRESENT: ICD-10-CM

## 2021-03-09 DIAGNOSIS — E78.00 PURE HYPERCHOLESTEROLEMIA: ICD-10-CM

## 2021-03-09 DIAGNOSIS — I10 ESSENTIAL HYPERTENSION: Primary | ICD-10-CM

## 2021-03-09 PROCEDURE — 99214 PR OFFICE/OUTPT VISIT, EST, LEVL IV, 30-39 MIN: ICD-10-PCS | Mod: S$GLB,,, | Performed by: NURSE PRACTITIONER

## 2021-03-09 PROCEDURE — 99214 OFFICE O/P EST MOD 30 MIN: CPT | Mod: S$GLB,,, | Performed by: NURSE PRACTITIONER

## 2021-03-09 RX ORDER — ROSUVASTATIN CALCIUM 10 MG/1
10 TABLET, COATED ORAL NIGHTLY
Qty: 90 TABLET | Refills: 1 | Status: SHIPPED | OUTPATIENT
Start: 2021-03-09 | End: 2021-09-13

## 2021-03-09 RX ORDER — METOPROLOL SUCCINATE 25 MG/1
25 TABLET, EXTENDED RELEASE ORAL DAILY
COMMUNITY
Start: 2021-02-23 | End: 2023-09-14

## 2021-03-09 RX ORDER — AMLODIPINE BESYLATE 5 MG/1
5 TABLET ORAL DAILY
Qty: 90 TABLET | Refills: 1 | Status: SHIPPED | OUTPATIENT
Start: 2021-03-09 | End: 2022-03-14

## 2021-03-09 RX ORDER — LOSARTAN POTASSIUM AND HYDROCHLOROTHIAZIDE 12.5; 5 MG/1; MG/1
1 TABLET ORAL DAILY
Qty: 90 TABLET | Refills: 1 | Status: SHIPPED | OUTPATIENT
Start: 2021-03-09 | End: 2021-10-26

## 2021-04-20 DIAGNOSIS — M48.02 CERVICAL STENOSIS OF SPINE: ICD-10-CM

## 2021-04-20 DIAGNOSIS — M54.2 NECK PAIN: Primary | ICD-10-CM

## 2021-04-29 ENCOUNTER — PATIENT MESSAGE (OUTPATIENT)
Dept: RESEARCH | Facility: HOSPITAL | Age: 61
End: 2021-04-29

## 2021-05-03 ENCOUNTER — CLINICAL SUPPORT (OUTPATIENT)
Dept: REHABILITATION | Facility: HOSPITAL | Age: 61
End: 2021-05-03
Payer: MEDICAID

## 2021-05-03 DIAGNOSIS — R29.898 DECREASED RANGE OF MOTION OF NECK: ICD-10-CM

## 2021-05-03 DIAGNOSIS — R29.3 ABNORMAL POSTURE: ICD-10-CM

## 2021-05-03 DIAGNOSIS — M48.02 STENOSIS, CERVICAL SPINE: Primary | ICD-10-CM

## 2021-05-03 PROCEDURE — 97162 PT EVAL MOD COMPLEX 30 MIN: CPT | Mod: PN

## 2021-05-05 PROBLEM — R29.898 DECREASED RANGE OF MOTION OF NECK: Status: ACTIVE | Noted: 2021-05-05

## 2021-05-05 PROBLEM — R29.3 ABNORMAL POSTURE: Status: ACTIVE | Noted: 2021-05-05

## 2021-05-20 ENCOUNTER — CLINICAL SUPPORT (OUTPATIENT)
Dept: REHABILITATION | Facility: HOSPITAL | Age: 61
End: 2021-05-20
Payer: MEDICAID

## 2021-05-20 DIAGNOSIS — R29.898 DECREASED RANGE OF MOTION OF NECK: ICD-10-CM

## 2021-05-20 DIAGNOSIS — R29.3 ABNORMAL POSTURE: ICD-10-CM

## 2021-05-20 PROCEDURE — 97112 NEUROMUSCULAR REEDUCATION: CPT | Mod: PN

## 2021-05-20 PROCEDURE — 97110 THERAPEUTIC EXERCISES: CPT | Mod: PN

## 2021-05-24 ENCOUNTER — CLINICAL SUPPORT (OUTPATIENT)
Dept: REHABILITATION | Facility: HOSPITAL | Age: 61
End: 2021-05-24
Payer: MEDICAID

## 2021-05-24 DIAGNOSIS — R29.898 DECREASED RANGE OF MOTION OF NECK: ICD-10-CM

## 2021-05-24 DIAGNOSIS — R29.3 ABNORMAL POSTURE: ICD-10-CM

## 2021-05-24 PROCEDURE — 97110 THERAPEUTIC EXERCISES: CPT | Mod: PN

## 2021-05-27 ENCOUNTER — CLINICAL SUPPORT (OUTPATIENT)
Dept: REHABILITATION | Facility: HOSPITAL | Age: 61
End: 2021-05-27
Payer: MEDICAID

## 2021-05-27 DIAGNOSIS — R29.3 ABNORMAL POSTURE: ICD-10-CM

## 2021-05-27 DIAGNOSIS — R29.898 DECREASED RANGE OF MOTION OF NECK: ICD-10-CM

## 2021-05-27 PROCEDURE — 97110 THERAPEUTIC EXERCISES: CPT | Mod: PN,CQ

## 2021-05-31 ENCOUNTER — DOCUMENTATION ONLY (OUTPATIENT)
Dept: REHABILITATION | Facility: HOSPITAL | Age: 61
End: 2021-05-31

## 2021-05-31 ENCOUNTER — CLINICAL SUPPORT (OUTPATIENT)
Dept: REHABILITATION | Facility: HOSPITAL | Age: 61
End: 2021-05-31
Payer: MEDICAID

## 2021-05-31 DIAGNOSIS — R29.898 DECREASED RANGE OF MOTION OF NECK: ICD-10-CM

## 2021-05-31 DIAGNOSIS — R29.3 ABNORMAL POSTURE: ICD-10-CM

## 2021-05-31 PROCEDURE — 97110 THERAPEUTIC EXERCISES: CPT | Mod: PN,CQ

## 2021-06-01 ENCOUNTER — TELEPHONE (OUTPATIENT)
Dept: FAMILY MEDICINE | Facility: CLINIC | Age: 61
End: 2021-06-01

## 2021-06-02 ENCOUNTER — CLINICAL SUPPORT (OUTPATIENT)
Dept: REHABILITATION | Facility: HOSPITAL | Age: 61
End: 2021-06-02
Payer: MEDICAID

## 2021-06-02 ENCOUNTER — TELEPHONE (OUTPATIENT)
Dept: FAMILY MEDICINE | Facility: CLINIC | Age: 61
End: 2021-06-02

## 2021-06-02 DIAGNOSIS — R29.898 DECREASED RANGE OF MOTION OF NECK: ICD-10-CM

## 2021-06-02 DIAGNOSIS — R29.3 ABNORMAL POSTURE: ICD-10-CM

## 2021-06-02 PROCEDURE — 97110 THERAPEUTIC EXERCISES: CPT | Mod: PN

## 2021-06-17 ENCOUNTER — CLINICAL SUPPORT (OUTPATIENT)
Dept: REHABILITATION | Facility: HOSPITAL | Age: 61
End: 2021-06-17
Payer: MEDICAID

## 2021-06-17 DIAGNOSIS — R29.3 ABNORMAL POSTURE: ICD-10-CM

## 2021-06-17 DIAGNOSIS — R29.898 DECREASED RANGE OF MOTION OF NECK: ICD-10-CM

## 2021-06-17 PROCEDURE — 97140 MANUAL THERAPY 1/> REGIONS: CPT | Mod: PN

## 2021-06-17 PROCEDURE — 97110 THERAPEUTIC EXERCISES: CPT | Mod: PN

## 2021-06-21 ENCOUNTER — CLINICAL SUPPORT (OUTPATIENT)
Dept: REHABILITATION | Facility: HOSPITAL | Age: 61
End: 2021-06-21
Payer: MEDICAID

## 2021-06-21 DIAGNOSIS — R29.3 ABNORMAL POSTURE: ICD-10-CM

## 2021-06-21 DIAGNOSIS — R29.898 DECREASED RANGE OF MOTION OF NECK: ICD-10-CM

## 2021-06-21 PROCEDURE — 97110 THERAPEUTIC EXERCISES: CPT | Mod: PN

## 2021-06-23 ENCOUNTER — CLINICAL SUPPORT (OUTPATIENT)
Dept: REHABILITATION | Facility: HOSPITAL | Age: 61
End: 2021-06-23
Payer: MEDICAID

## 2021-06-23 ENCOUNTER — DOCUMENTATION ONLY (OUTPATIENT)
Dept: REHABILITATION | Facility: HOSPITAL | Age: 61
End: 2021-06-23

## 2021-06-23 DIAGNOSIS — R29.3 ABNORMAL POSTURE: ICD-10-CM

## 2021-06-23 DIAGNOSIS — R29.898 DECREASED RANGE OF MOTION OF NECK: ICD-10-CM

## 2021-06-23 PROCEDURE — 97110 THERAPEUTIC EXERCISES: CPT | Mod: PN,CQ

## 2021-08-04 ENCOUNTER — TELEPHONE (OUTPATIENT)
Dept: FAMILY MEDICINE | Facility: CLINIC | Age: 61
End: 2021-08-04

## 2021-09-13 ENCOUNTER — OFFICE VISIT (OUTPATIENT)
Dept: FAMILY MEDICINE | Facility: CLINIC | Age: 61
End: 2021-09-13
Payer: MEDICAID

## 2021-09-13 VITALS
BODY MASS INDEX: 40.43 KG/M2 | TEMPERATURE: 99 F | SYSTOLIC BLOOD PRESSURE: 120 MMHG | WEIGHT: 315 LBS | OXYGEN SATURATION: 97 % | HEART RATE: 70 BPM | HEIGHT: 74 IN | DIASTOLIC BLOOD PRESSURE: 80 MMHG

## 2021-09-13 DIAGNOSIS — I10 ESSENTIAL HYPERTENSION: Primary | ICD-10-CM

## 2021-09-13 DIAGNOSIS — E78.00 PURE HYPERCHOLESTEROLEMIA: ICD-10-CM

## 2021-09-13 PROCEDURE — 99213 PR OFFICE/OUTPT VISIT, EST, LEVL III, 20-29 MIN: ICD-10-PCS | Mod: S$GLB,,, | Performed by: NURSE PRACTITIONER

## 2021-09-13 PROCEDURE — 99213 OFFICE O/P EST LOW 20 MIN: CPT | Mod: S$GLB,,, | Performed by: NURSE PRACTITIONER

## 2021-09-13 RX ORDER — ATORVASTATIN CALCIUM 40 MG/1
TABLET, FILM COATED ORAL
COMMUNITY
End: 2022-01-25

## 2021-10-18 DIAGNOSIS — M54.12 RADICULOPATHY, CERVICAL: ICD-10-CM

## 2021-10-18 DIAGNOSIS — M48.02 CERVICAL STENOSIS OF SPINE: Primary | ICD-10-CM

## 2021-10-18 DIAGNOSIS — R55 NEAR SYNCOPE: ICD-10-CM

## 2021-10-27 ENCOUNTER — HOSPITAL ENCOUNTER (OUTPATIENT)
Dept: RADIOLOGY | Facility: HOSPITAL | Age: 61
Discharge: HOME | End: 2021-10-27
Attending: NURSE PRACTITIONER
Payer: MEDICAID

## 2021-10-27 DIAGNOSIS — M54.12 RADICULOPATHY, CERVICAL: ICD-10-CM

## 2021-10-27 DIAGNOSIS — M48.02 CERVICAL STENOSIS OF SPINE: ICD-10-CM

## 2021-10-27 DIAGNOSIS — R55 NEAR SYNCOPE: ICD-10-CM

## 2021-10-27 LAB
CREAT SERPL-MCNC: 1.2 MG/DL (ref 0.5–1.4)
SAMPLE: NORMAL

## 2021-10-27 PROCEDURE — 25500020 PHARM REV CODE 255: Mod: PO | Performed by: NURSE PRACTITIONER

## 2021-10-27 PROCEDURE — 82565 ASSAY OF CREATININE: CPT | Mod: PO

## 2021-10-27 RX ADMIN — IOHEXOL 100 ML: 350 INJECTION, SOLUTION INTRAVENOUS at 01:10

## 2022-01-25 ENCOUNTER — OFFICE VISIT (OUTPATIENT)
Dept: FAMILY MEDICINE | Facility: CLINIC | Age: 62
End: 2022-01-25
Payer: MEDICAID

## 2022-01-25 VITALS
HEART RATE: 64 BPM | DIASTOLIC BLOOD PRESSURE: 90 MMHG | HEIGHT: 74 IN | SYSTOLIC BLOOD PRESSURE: 120 MMHG | WEIGHT: 315 LBS | TEMPERATURE: 98 F | BODY MASS INDEX: 40.43 KG/M2 | OXYGEN SATURATION: 96 %

## 2022-01-25 DIAGNOSIS — U07.1 LAB TEST POSITIVE FOR DETECTION OF COVID-19 VIRUS: ICD-10-CM

## 2022-01-25 DIAGNOSIS — R05.9 COUGH: Primary | ICD-10-CM

## 2022-01-25 PROCEDURE — 3074F SYST BP LT 130 MM HG: CPT | Mod: S$GLB,,, | Performed by: NURSE PRACTITIONER

## 2022-01-25 PROCEDURE — 1160F PR REVIEW ALL MEDS BY PRESCRIBER/CLIN PHARMACIST DOCUMENTED: ICD-10-PCS | Mod: S$GLB,,, | Performed by: NURSE PRACTITIONER

## 2022-01-25 PROCEDURE — 3074F PR MOST RECENT SYSTOLIC BLOOD PRESSURE < 130 MM HG: ICD-10-PCS | Mod: S$GLB,,, | Performed by: NURSE PRACTITIONER

## 2022-01-25 PROCEDURE — 3008F BODY MASS INDEX DOCD: CPT | Mod: S$GLB,,, | Performed by: NURSE PRACTITIONER

## 2022-01-25 PROCEDURE — U0002 COVID-19 LAB TEST NON-CDC: HCPCS | Mod: QW,S$GLB,, | Performed by: NURSE PRACTITIONER

## 2022-01-25 PROCEDURE — 99213 OFFICE O/P EST LOW 20 MIN: CPT | Mod: S$GLB,,, | Performed by: NURSE PRACTITIONER

## 2022-01-25 PROCEDURE — 3008F PR BODY MASS INDEX (BMI) DOCUMENTED: ICD-10-PCS | Mod: S$GLB,,, | Performed by: NURSE PRACTITIONER

## 2022-01-25 PROCEDURE — U0002: ICD-10-PCS | Mod: QW,S$GLB,, | Performed by: NURSE PRACTITIONER

## 2022-01-25 PROCEDURE — 1160F RVW MEDS BY RX/DR IN RCRD: CPT | Mod: S$GLB,,, | Performed by: NURSE PRACTITIONER

## 2022-01-25 PROCEDURE — 3080F DIAST BP >= 90 MM HG: CPT | Mod: S$GLB,,, | Performed by: NURSE PRACTITIONER

## 2022-01-25 PROCEDURE — 3080F PR MOST RECENT DIASTOLIC BLOOD PRESSURE >= 90 MM HG: ICD-10-PCS | Mod: S$GLB,,, | Performed by: NURSE PRACTITIONER

## 2022-01-25 PROCEDURE — 99213 PR OFFICE/OUTPT VISIT, EST, LEVL III, 20-29 MIN: ICD-10-PCS | Mod: S$GLB,,, | Performed by: NURSE PRACTITIONER

## 2022-01-25 RX ORDER — GUAIFENESIN 600 MG/1
600 TABLET, EXTENDED RELEASE ORAL 2 TIMES DAILY
Qty: 20 TABLET | Refills: 0 | Status: SHIPPED | OUTPATIENT
Start: 2022-01-25 | End: 2022-02-03 | Stop reason: ALTCHOICE

## 2022-01-25 RX ORDER — FLUTICASONE PROPIONATE 50 MCG
2 SPRAY, SUSPENSION (ML) NASAL DAILY
Qty: 9.9 ML | Refills: 0 | Status: SHIPPED | OUTPATIENT
Start: 2022-01-25 | End: 2022-02-03 | Stop reason: ALTCHOICE

## 2022-01-25 RX ORDER — BENZONATATE 200 MG/1
200 CAPSULE ORAL 3 TIMES DAILY PRN
Qty: 30 CAPSULE | Refills: 0 | Status: SHIPPED | OUTPATIENT
Start: 2022-01-25 | End: 2022-02-03 | Stop reason: ALTCHOICE

## 2022-01-25 NOTE — PROGRESS NOTES
SUBJECTIVE:      Patient ID: Otis Colón is a 61 y.o. male.    Chief Complaint: Cough and Chest Congestion    Cough  This is a new problem. The current episode started in the past 7 days. The problem has been gradually worsening. The cough is non-productive. Associated symptoms include ear congestion, a fever, headaches, nasal congestion and postnasal drip. Pertinent negatives include no chest pain, chills, shortness of breath or wheezing. The symptoms are aggravated by lying down. He has tried OTC cough suppressant for the symptoms. The treatment provided mild relief.       Past Surgical History:   Procedure Laterality Date    APPENDECTOMY      CERVICAL FUSION  2009    CROSS FINGER FLAP      FRACTURE SURGERY      steel librado in right leg    KNEE ARTHROSCOPY Right     ORTHOPEDIC SURGERY Right     librado from knee to ankle    RHIZOTOMY  08/2016     Family History   Problem Relation Age of Onset    Hypertension Mother     No Known Problems Sister     No Known Problems Brother       Social History     Socioeconomic History    Marital status:    Tobacco Use    Smoking status: Never Smoker    Smokeless tobacco: Never Used   Substance and Sexual Activity    Alcohol use: No    Drug use: No    Sexual activity: Yes     Partners: Female     Social Determinants of Health     Food Insecurity: No Food Insecurity    Worried About Running Out of Food in the Last Year: Never true    Ran Out of Food in the Last Year: Never true   Transportation Needs: No Transportation Needs    Lack of Transportation (Medical): No    Lack of Transportation (Non-Medical): No   Physical Activity: Unknown    Days of Exercise per Week: 2 days   Stress: No Stress Concern Present    Feeling of Stress : Not at all   Social Connections: Unknown    Frequency of Communication with Friends and Family: More than three times a week    Frequency of Social Gatherings with Friends and Family: Once a week    Active Member of  Clubs or Organizations: No    Attends Club or Organization Meetings: Never    Marital Status:      Current Outpatient Medications   Medication Sig Dispense Refill    amLODIPine (NORVASC) 5 MG tablet Take 1 tablet (5 mg total) by mouth once daily. 90 tablet 1    apixaban (ELIQUIS) 5 mg Tab Take 5 mg by mouth 2 (two) times daily.      coQ10, ubiquinol, 100 mg Cap Take 1 capsule by mouth once daily. 90 capsule 0    losartan-hydrochlorothiazide 50-12.5 mg (HYZAAR) 50-12.5 mg per tablet TAKE 1 TABLET BY MOUTH EVERY DAY 90 tablet 0    metoprolol succinate (TOPROL-XL) 25 MG 24 hr tablet Take 25 mg by mouth once daily.      omeprazole (PRILOSEC) 40 MG capsule TAKE 1 CAPSULE(40 MG) BY MOUTH EVERY DAY 30 capsule 1    rosuvastatin (CRESTOR) 10 MG tablet TAKE 1 TABLET(10 MG) BY MOUTH EVERY EVENING 90 tablet 0    benzonatate (TESSALON) 200 MG capsule Take 1 capsule (200 mg total) by mouth 3 (three) times daily as needed. 30 capsule 0    fluticasone propionate (FLONASE) 50 mcg/actuation nasal spray 2 sprays (100 mcg total) by Each Nostril route once daily. 9.9 mL 0    guaiFENesin (MUCINEX) 600 mg 12 hr tablet Take 1 tablet (600 mg total) by mouth 2 (two) times daily. for 10 days 20 tablet 0     No current facility-administered medications for this visit.     Review of patient's allergies indicates:  No Known Allergies   Past Medical History:   Diagnosis Date    CAD (coronary artery disease) 1/13/2017    Cervical spinal stenosis     Hyperlipidemia     Hypertension     Migraine headache      Past Surgical History:   Procedure Laterality Date    APPENDECTOMY      CERVICAL FUSION  2009    CROSS FINGER FLAP      FRACTURE SURGERY      steel librado in right leg    KNEE ARTHROSCOPY Right     ORTHOPEDIC SURGERY Right     librado from knee to ankle    RHIZOTOMY  08/2016       Review of Systems   Constitutional: Positive for fatigue and fever. Negative for appetite change, chills, diaphoresis and unexpected weight  "change.   HENT: Positive for congestion, postnasal drip and sinus pressure. Negative for ear discharge, facial swelling, hearing loss, nosebleeds and trouble swallowing.    Eyes: Negative for photophobia, pain and visual disturbance.   Respiratory: Positive for cough. Negative for apnea, choking, shortness of breath and wheezing.    Cardiovascular: Negative for chest pain and palpitations.   Gastrointestinal: Negative for abdominal pain, blood in stool and vomiting.   Endocrine: Negative for polyphagia.   Genitourinary: Negative for difficulty urinating and hematuria.   Musculoskeletal: Negative for gait problem and joint swelling.   Skin: Negative for pallor.   Neurological: Positive for headaches. Negative for dizziness, seizures, speech difficulty and weakness.   Hematological: Does not bruise/bleed easily.   Psychiatric/Behavioral: Negative for agitation and confusion.      OBJECTIVE:      Vitals:    01/25/22 0953   BP: (!) 120/90   Pulse: 64   Temp: 98.4 °F (36.9 °C)   SpO2: 96%   Weight: (!) 153.3 kg (338 lb)   Height: 6' 2" (1.88 m)     Physical Exam  Vitals and nursing note reviewed.   Constitutional:       Appearance: He is well-developed and well-nourished.   HENT:      Head: Atraumatic.      Right Ear: Tympanic membrane normal.      Left Ear: Tympanic membrane normal.      Nose: Rhinorrhea present. Mucosal edema: turbinates erythematous and swollen.   Eyes:      Conjunctiva/sclera: Conjunctivae normal.   Cardiovascular:      Rate and Rhythm: Normal rate and regular rhythm.      Pulses: Normal pulses and intact distal pulses.      Heart sounds: Normal heart sounds. No murmur heard.      Pulmonary:      Effort: Pulmonary effort is normal.      Breath sounds: Normal breath sounds. No wheezing, rhonchi or rales.   Abdominal:      General: Bowel sounds are normal. There is no distension.      Palpations: Abdomen is soft.      Tenderness: There is no abdominal tenderness.   Musculoskeletal:         General: " Normal range of motion.      Cervical back: Neck supple.   Skin:     General: Skin is warm and dry.   Neurological:      Mental Status: He is alert and oriented to person, place, and time.   Psychiatric:         Mood and Affect: Mood and affect and mood normal.         Behavior: Behavior normal.         Thought Content: Thought content normal.         Judgment: Judgment normal.        Assessment:       1. Cough    2. Lab test positive for detection of COVID-19 virus        Plan:       Cough  -     POCT COVID-19 Rapid Screening    Lab test positive for detection of COVID-19 virus  -     Ambulatory referral/consult to [facility]; Future; Expected date: 01/26/2022  -     benzonatate (TESSALON) 200 MG capsule; Take 1 capsule (200 mg total) by mouth 3 (three) times daily as needed.  Dispense: 30 capsule; Refill: 0  -     guaiFENesin (MUCINEX) 600 mg 12 hr tablet; Take 1 tablet (600 mg total) by mouth 2 (two) times daily. for 10 days  Dispense: 20 tablet; Refill: 0  -     fluticasone propionate (FLONASE) 50 mcg/actuation nasal spray; 2 sprays (100 mcg total) by Each Nostril route once daily.  Dispense: 9.9 mL; Refill: 0        Follow up if symptoms worsen or fail to improve.      1/25/2022 PHILLIP Rivera, FNP

## 2022-01-25 NOTE — PATIENT INSTRUCTIONS
Patient Education       COVID-19 Discharge Instructions   About this topic   Coronavirus disease 2019 is also known as COVID-19. It is a viral illness that infects the lungs. It is caused by a virus called SARS-associated coronavirus (SARS-CoV-2).  The signs of COVID-19 most often start a few days after you have been infected. In some people, it takes longer to show signs. Others never show signs of the infection. You may have a cough, fever, shaking chills and it may be hard to breathe. You may be very tired, have muscle aches, a headache or sore throat. Some people have an upset stomach or loose stools. Others lose their sense of smell or taste. You may not have these signs all the time and they may come and go while you are sick.  The virus spreads easily through droplets when you talk, sneeze, or cough. You can pass the virus to others when you are talking close together, singing, hugging, sharing food, or shaking hands. Doctors believe the germs also survive on surfaces like tables, door handles, and telephones. However, this is not a common way that COVID-19 spreads. Doctors believe you can also spread the infection even if you dont have any symptoms, but they do not know how that happens. This is why getting vaccinated is one of the best ways to keep you healthy and slow the spread of the virus.  Some people have a mild case of COVID-19 and are able to stay at home and away from others until they feel better. Others may need to be in the hospital if they are very sick. Some people with COVID-19 can have some symptoms for weeks or months. People with COVID-19 must isolate themselves. You can start to be around others when your doctor says it is safe to do so.       What care is needed at home?   · Ask your doctor what you need to do when you go home. Make sure you ask questions if you do not understand what the doctor says.  · Drink lots of water, juice, or broth to replace fluids lost from a fever.  · You  may use cool mist humidifiers to help ease congestion and coughing.  · Use 2 to 3 pillows to prop yourself up when you lie down to make it easier to breathe and sleep.  · Do not smoke and do not drink beer, wine, and mixed drinks (alcohol).  · To lower the chance of passing the infection to others, get a COVID-19 vaccine after your infection has resolved.  · If you have not been fully vaccinated:  ? Wear a mask over your mouth and nose if you are around others who are not sick. Cloth masks work best if they have more than one layer of fabric.  ? Wash your hands often.  ? Stay home in a separate room, if possible, away from others. Only go out to get medical care.  ? Use a separate bathroom if possible.  ? Do not make food for others.  What follow-up care is needed?   · Your doctor may ask you to make visits to the office to check on your progress. Be sure to keep these visits. Make sure you wear a mask at these visits.  · If you can, tell the staff you have COVID-19 ahead of time so they can take extra care to stop the disease from spreading.  · It may take a few weeks before your health returns to normal.  What drugs may be needed?   The doctor may order drugs to:  · Help with breathing  · Help with fever  · Help with swelling in your airways and lungs  · Control coughing  · Ease a sore throat  · Help a runny or stuffy nose  Will physical activity be limited?   You may have to limit your physical activity. Talk to your doctor about the right amount of activity for you. If you have been very sick with COVID-19, it can take some time to get your strength back.  Will there be any other care needed?   Doctors do not know how long you can pass the virus on to others after you are sick. This is why it is important to stay in a separate room, if possible, when you are sick. For now, doctors are giving general guidelines for you to follow after you have been sick. Before you go around other people, you should:  · Be fever  free for 24 hours without taking any drugs to lower the fever  · Have no symptoms of cough or shortness of breath  · Wait at least 10 days after first having symptoms or your first positive test, and you need to be symptom free as above. Some experts suggest waiting 20 days if you have had a more severe infection.  Talk with your doctor about getting a COVID-19 vaccine.  What problems could happen?   · Fluid loss. This is dehydration.  · Short-term or long-term lung damage  · Heart problems  · Death  When do I need to call the doctor?   · You are having so much trouble breathing that you can only say one or two words at a time.  · You need to sit upright at all times to be able to breathe and/or cannot lie down.  · You are very confused or cannot stay awake.  · Your lips or skin start to turn blue or grey.  · You think you might be having a medical emergency. Some examples of medical emergencies are:  ? Severe chest pain.  ? Not able to speak or move normally.  · You have trouble breathing when talking or sitting still.  · You have new shortness of breath.  · You become weak or dizzy.  · You have very dark urine or do not pass urine for more than 8 hours.  · You have new or worsening COVID-19 symptoms like:  ? Fever  ? Cough  ? Feeling very tired  ? Shaking chills  ? Headache  ? Trouble swallowing  ? Throwing up  ? Loose stools  ? Reddish purple spots on your fingers or toes  Teach Back: Helping You Understand   The Teach Back Method helps you understand the information we are giving you. After you talk with the staff, tell them in your own words what you learned. This helps to make sure the staff has described each thing clearly. It also helps to explain things that may have been confusing. Before going home, make sure you can do these:  · I can tell you about my condition.  · I can tell you what may help ease my breathing.  · I can tell you what I can do to help avoid passing the infection to others.  · I can tell  you what I will do if I have trouble breathing; feel sleepy or confused; or my fingertips, fingernails, skin, or lips are blue.  Where can I learn more?   Centers for Disease Control and Prevention  https://www.cdc.gov/coronavirus/2019-ncov/about/index.html   Centers for Disease Control and Prevention  https://www.cdc.gov/coronavirus/2019-ncov/hcp/disposition-in-home-patients.html   World Health Organization  https://www.who.int/news-room/q-a-detail/g-u-qahnugeezgpii   Last Reviewed Date   2021-10-05  Consumer Information Use and Disclaimer   This information is not specific medical advice and does not replace information you receive from your health care provider. This is only a brief summary of general information. It does NOT include all information about conditions, illnesses, injuries, tests, procedures, treatments, therapies, discharge instructions or life-style choices that may apply to you. You must talk with your health care provider for complete information about your health and treatment options. This information should not be used to decide whether or not to accept your health care providers advice, instructions or recommendations. Only your health care provider has the knowledge and training to provide advice that is right for you.  Copyright   Copyright © 2021 [employer] and its affiliates and/or licensors. All rights reserved.

## 2022-01-27 DIAGNOSIS — U07.1 COVID-19 VIRUS DETECTED: ICD-10-CM

## 2022-01-27 LAB
CTP QC/QA: YES
SARS-COV-2 RDRP RESP QL NAA+PROBE: POSITIVE

## 2022-02-03 ENCOUNTER — OFFICE VISIT (OUTPATIENT)
Dept: NEUROSURGERY | Facility: CLINIC | Age: 62
End: 2022-02-03
Payer: MEDICAID

## 2022-02-03 VITALS — HEIGHT: 74 IN | BODY MASS INDEX: 40.43 KG/M2 | WEIGHT: 315 LBS

## 2022-02-03 DIAGNOSIS — Z98.1 STATUS POST CERVICAL SPINAL FUSION: ICD-10-CM

## 2022-02-03 DIAGNOSIS — M50.30 DDD (DEGENERATIVE DISC DISEASE), CERVICAL: Primary | ICD-10-CM

## 2022-02-03 DIAGNOSIS — R55 SYNCOPE AND COLLAPSE: ICD-10-CM

## 2022-02-03 DIAGNOSIS — R42 POSTURAL DIZZINESS WITH NEAR SYNCOPE: ICD-10-CM

## 2022-02-03 DIAGNOSIS — R55 POSTURAL DIZZINESS WITH NEAR SYNCOPE: ICD-10-CM

## 2022-02-03 PROCEDURE — 3008F BODY MASS INDEX DOCD: CPT | Mod: CPTII,S$GLB,, | Performed by: NEUROLOGICAL SURGERY

## 2022-02-03 PROCEDURE — 3008F PR BODY MASS INDEX (BMI) DOCUMENTED: ICD-10-PCS | Mod: CPTII,S$GLB,, | Performed by: NEUROLOGICAL SURGERY

## 2022-02-03 PROCEDURE — 99204 PR OFFICE/OUTPT VISIT, NEW, LEVL IV, 45-59 MIN: ICD-10-PCS | Mod: S$GLB,,, | Performed by: NEUROLOGICAL SURGERY

## 2022-02-03 PROCEDURE — 99204 OFFICE O/P NEW MOD 45 MIN: CPT | Mod: S$GLB,,, | Performed by: NEUROLOGICAL SURGERY

## 2022-02-03 PROCEDURE — 1159F MED LIST DOCD IN RCRD: CPT | Mod: CPTII,S$GLB,, | Performed by: NEUROLOGICAL SURGERY

## 2022-02-03 PROCEDURE — 1159F PR MEDICATION LIST DOCUMENTED IN MEDICAL RECORD: ICD-10-PCS | Mod: CPTII,S$GLB,, | Performed by: NEUROLOGICAL SURGERY

## 2022-02-03 NOTE — PROGRESS NOTES
HPI:  This is a very pleasant 61-year-old gentleman who presents with neck pain in the lower cervical region with radiation into the left shoulder.  He describes aching burning sensation in these regions.  He also endorses intermittent numbness and tingling involving the bilateral upper arms and hands.  He attributes the hand numbness to carpal tunnel syndrome.  He currently rates his neck pain as 5/10, arm pain 3/10.  He notes that these symptoms began in 2015 after lifting a 500 lb car in June.  He previously underwent anterior cervical fusion C5-C7 in 2009. Prior to this procedure he had intractable left arm pain.  His current symptoms are dissimilar.  He denies focal extremity weakness, ataxia, bowel or bladder dysfunction.  He notes it previous cervical rhizotomy is provided 6 months pain relief.  Lastly, he notes that when extending and rotating 8 his neck to the right approaches blacking out.  He notes 1 episode in which she was on a commode with his head between his legs.  His wife found him on the floor after he lost consciousness, striking the wall of the bathroom.      He states he has been evaluated at neuro [facility] with MRI imaging of the brain which was reviewed and negative.  He also had a carotid ultrasound which was negative for stenosis or vessel anomaly    He underwent a MRI of the cervical spine at outside facility in March 2000 21.  Images not available.  Based on metallic artifact, a CT of the cervical spine with without contrast was ordered by Neurology.  This study is available for my review today.    He reports remote history of TIA after which he was started on Eliquis by a cardiologist, per the patient    Smoking status:  Nonsmoker  Past Medical History:   Diagnosis Date    CAD (coronary artery disease) 1/13/2017    Cervical spinal stenosis     Hyperlipidemia     Hypertension     Migraine headache       Past Surgical History:   Procedure Laterality Date    APPENDECTOMY       CERVICAL FUSION  2009    CROSS FINGER FLAP      FRACTURE SURGERY      steel librado in right leg    KNEE ARTHROSCOPY Right     ORTHOPEDIC SURGERY Right     librado from knee to ankle    RHIZOTOMY  08/2016    SPINE SURGERY       Social History     Tobacco Use    Smoking status: Never Smoker    Smokeless tobacco: Never Used   Substance Use Topics    Alcohol use: No    Drug use: No       Review of Systems: All systems reviewed and are as above or otherwise negative.    General: well developed, well nourished, no distress.   Head: normocephalic, atraumatic  Eyes: pupils equal, round, reactive to light with accomodation, EOMI.   Neck: trachea midline. No JVD  Cardiovascular: No LE edema   Pulmonary: normal respirations, no signs of respiratory distress  Abdomen: soft, non-distended, not tender to palpation  Sensory: intact to light touch throughout  Extremities: No bruising, deformity  Skin: Skin is warm, dry and intact.    Motor Strength: Moves all extremities spontaneously with good tone.  Full strength upper and lower extremities. No abnormal movements seen.     Strength  Deltoids Triceps Biceps Wrist Extension Wrist Flexion Hand    Upper: R 5/5 5/5 5/5 5/5 5/5 5/5    L 5/5 5/5 5/5 5/5 5/5 5/5     Iliopsoas Quadriceps Knee  Flexion Tibialis  anterior Gastro- cnemius EHL   Lower: R 5/5 5/5 5/5 5/5 5/5 5/5    L 5/5 5/5 5/5 5/5 5/5 5/5     Neurologic: Alert and oriented. Thought content appropriate.  GCS: Motor: 6/Verbal: 5/Eyes: 4 GCS Total: 15  Mental Status: Awake, Alert, Oriented x 4  Language: No aphasia  Speech: No dysarthria  Cranial nerves: face symmetric, tongue midline, CN II-XII grossly intact.     Pronator Drift: no drift noted  Finger-to-nose: Intact bilaterally  DTR's: 2 + and symmetric in UE and LE  Bell: absent  Clonus: absent  Babinski: absent    Gait: normal    Tandem Gait: No difficulty           Able to walk on heels & toes    Cervical Spine  ROM: full    Nontender to palpation    Lumbar  Spine   ROM: full   Nontender to palpation    Pain on Hip ROM: Negative  Straight leg raise: negative bilaterally     SI Joint tenderness: Negative bilaterally   NOHEMI: Negative bilaterally  Thigh Thrust: Negative bilaterally  Gaenslen: Negative bilaterally    Significant Exam Findings:  Motor and sensory intact.  No long tract signs.    Significant Radiology Findings:  CT cervical spine reviewed with the patient.  Previous fusion C5-7 with anterior plating.  Moderate adjacent degenerative disc disease C4-5, C6-7 with posterior osteophyte formation at these disc levels.  No appreciable canal stenosis identified.  Moderate left C4-5 foraminal stenosis.  Analysis:  His axial neck pain with radiation into the shoulder may be a function of the adjacent level degenerative disc disease at C4-5.  I recommended he obtain the outside MRI cervical spine for review.  I ordered ordered cervical x-rays with flexion-extension views.  It may also be necessary to obtain a CT myelogram of the cervical spine.  Relative to the near syncopal events he reports with cervical extension/flexion, I ordered a CT angiogram of the head and neck.  Degenerative changes in the cervical spine would not account for this issue.  We will see him back with the above the imaging for review further recommendations.    [unfilled]   Otis was seen today for neck pain and shoulder pain.    Diagnoses and all orders for this visit:    DDD (degenerative disc disease), cervical  -     X-Ray Cervical Spine 5 View With Flex And Ext; Future    Status post cervical spinal fusion  -     X-Ray Cervical Spine 5 View With Flex And Ext; Future    Postural dizziness with near syncope  -     X-Ray Cervical Spine 5 View With Flex And Ext; Future    Syncope and collapse  -     CTA Head and Neck (xpd); Future  -     X-Ray Cervical Spine 5 View With Flex And Ext; Future         Follow up in about 4 weeks (around 3/3/2022).

## 2022-02-22 ENCOUNTER — HOSPITAL ENCOUNTER (OUTPATIENT)
Dept: RADIOLOGY | Facility: HOSPITAL | Age: 62
Discharge: HOME | End: 2022-02-22
Attending: NEUROLOGICAL SURGERY
Payer: MEDICAID

## 2022-02-22 DIAGNOSIS — R55 SYNCOPE AND COLLAPSE: ICD-10-CM

## 2022-02-22 DIAGNOSIS — Z98.1 STATUS POST CERVICAL SPINAL FUSION: ICD-10-CM

## 2022-02-22 DIAGNOSIS — R42 POSTURAL DIZZINESS WITH NEAR SYNCOPE: ICD-10-CM

## 2022-02-22 DIAGNOSIS — M50.30 DDD (DEGENERATIVE DISC DISEASE), CERVICAL: ICD-10-CM

## 2022-02-22 DIAGNOSIS — R55 POSTURAL DIZZINESS WITH NEAR SYNCOPE: ICD-10-CM

## 2022-02-22 LAB
CREAT SERPL-MCNC: 1.2 MG/DL (ref 0.5–1.4)
SAMPLE: NORMAL

## 2022-02-22 PROCEDURE — 70496 CTA HEAD AND NECK (XPD): ICD-10-PCS | Mod: 26,,, | Performed by: RADIOLOGY

## 2022-02-22 PROCEDURE — 70498 CTA HEAD AND NECK (XPD): ICD-10-PCS | Mod: 26,,, | Performed by: RADIOLOGY

## 2022-02-22 PROCEDURE — 70498 CT ANGIOGRAPHY NECK: CPT | Mod: 26,,, | Performed by: RADIOLOGY

## 2022-02-22 PROCEDURE — 72052 X-RAY EXAM NECK SPINE 6/>VWS: CPT | Mod: TC,FY

## 2022-02-22 PROCEDURE — 70496 CT ANGIOGRAPHY HEAD: CPT | Mod: TC

## 2022-02-22 PROCEDURE — 72052 X-RAY EXAM NECK SPINE 6/>VWS: CPT | Mod: 26,,, | Performed by: RADIOLOGY

## 2022-02-22 PROCEDURE — 70496 CT ANGIOGRAPHY HEAD: CPT | Mod: 26,,, | Performed by: RADIOLOGY

## 2022-02-22 PROCEDURE — 25500020 PHARM REV CODE 255: Performed by: NEUROLOGICAL SURGERY

## 2022-02-22 PROCEDURE — 72052 XR CERVICAL SPINE 5 VIEW WITH FLEX AND EXT: ICD-10-PCS | Mod: 26,,, | Performed by: RADIOLOGY

## 2022-02-22 RX ADMIN — IOHEXOL 75 ML: 350 INJECTION, SOLUTION INTRAVENOUS at 10:02

## 2022-03-07 ENCOUNTER — OFFICE VISIT (OUTPATIENT)
Dept: NEUROSURGERY | Facility: CLINIC | Age: 62
End: 2022-03-07
Payer: MEDICAID

## 2022-03-07 VITALS
WEIGHT: 315 LBS | RESPIRATION RATE: 14 BRPM | HEIGHT: 74 IN | BODY MASS INDEX: 40.43 KG/M2 | HEART RATE: 69 BPM | SYSTOLIC BLOOD PRESSURE: 146 MMHG | DIASTOLIC BLOOD PRESSURE: 92 MMHG | OXYGEN SATURATION: 96 %

## 2022-03-07 DIAGNOSIS — R42 POSTURAL DIZZINESS WITH NEAR SYNCOPE: Primary | ICD-10-CM

## 2022-03-07 DIAGNOSIS — Q28.3: ICD-10-CM

## 2022-03-07 DIAGNOSIS — R55 POSTURAL DIZZINESS WITH NEAR SYNCOPE: Primary | ICD-10-CM

## 2022-03-07 DIAGNOSIS — Z98.1 STATUS POST CERVICAL SPINAL FUSION: ICD-10-CM

## 2022-03-07 PROCEDURE — 99214 PR OFFICE/OUTPT VISIT, EST, LEVL IV, 30-39 MIN: ICD-10-PCS | Mod: S$GLB,,, | Performed by: NEUROLOGICAL SURGERY

## 2022-03-07 PROCEDURE — 3080F PR MOST RECENT DIASTOLIC BLOOD PRESSURE >= 90 MM HG: ICD-10-PCS | Mod: CPTII,S$GLB,, | Performed by: NEUROLOGICAL SURGERY

## 2022-03-07 PROCEDURE — 99214 OFFICE O/P EST MOD 30 MIN: CPT | Mod: S$GLB,,, | Performed by: NEUROLOGICAL SURGERY

## 2022-03-07 PROCEDURE — 1159F MED LIST DOCD IN RCRD: CPT | Mod: CPTII,S$GLB,, | Performed by: NEUROLOGICAL SURGERY

## 2022-03-07 PROCEDURE — 1160F PR REVIEW ALL MEDS BY PRESCRIBER/CLIN PHARMACIST DOCUMENTED: ICD-10-PCS | Mod: CPTII,S$GLB,, | Performed by: NEUROLOGICAL SURGERY

## 2022-03-07 PROCEDURE — 3008F PR BODY MASS INDEX (BMI) DOCUMENTED: ICD-10-PCS | Mod: CPTII,S$GLB,, | Performed by: NEUROLOGICAL SURGERY

## 2022-03-07 PROCEDURE — 1160F RVW MEDS BY RX/DR IN RCRD: CPT | Mod: CPTII,S$GLB,, | Performed by: NEUROLOGICAL SURGERY

## 2022-03-07 PROCEDURE — 3080F DIAST BP >= 90 MM HG: CPT | Mod: CPTII,S$GLB,, | Performed by: NEUROLOGICAL SURGERY

## 2022-03-07 PROCEDURE — 3077F SYST BP >= 140 MM HG: CPT | Mod: CPTII,S$GLB,, | Performed by: NEUROLOGICAL SURGERY

## 2022-03-07 PROCEDURE — 1159F PR MEDICATION LIST DOCUMENTED IN MEDICAL RECORD: ICD-10-PCS | Mod: CPTII,S$GLB,, | Performed by: NEUROLOGICAL SURGERY

## 2022-03-07 PROCEDURE — 3077F PR MOST RECENT SYSTOLIC BLOOD PRESSURE >= 140 MM HG: ICD-10-PCS | Mod: CPTII,S$GLB,, | Performed by: NEUROLOGICAL SURGERY

## 2022-03-07 PROCEDURE — 3008F BODY MASS INDEX DOCD: CPT | Mod: CPTII,S$GLB,, | Performed by: NEUROLOGICAL SURGERY

## 2022-03-07 RX ORDER — ATORVASTATIN CALCIUM 40 MG/1
TABLET, FILM COATED ORAL
COMMUNITY
End: 2022-03-14

## 2022-03-07 NOTE — PROGRESS NOTES
HPI:  This is a very pleasant 61-year-old gentleman who presents with neck pain in the lower cervical region with radiation into the left shoulder.  He describes aching burning sensation in these regions.  He also endorses intermittent numbness and tingling involving the bilateral upper arms and hands.  He attributes the hand numbness to carpal tunnel syndrome.  He currently rates his neck pain as 5/10, arm pain 3/10.  He notes that these symptoms began in 2015 after lifting a 500 lb car in June.  He previously underwent anterior cervical fusion C5-C7 in 2009. Prior to this procedure he had intractable left arm pain.  His current symptoms are dissimilar.  He denies focal extremity weakness, ataxia, bowel or bladder dysfunction.  He notes it previous cervical rhizotomy is provided 6 months pain relief.  Lastly, he notes that when extending and rotating his neck to the right, he approaches blacking out.  He does notes 1 syncopal event in which she was on the commode with his head tilted forward. His subsequently wife found him on the floor after he lost consciousness.  As he fell forward he struck his head against the wall.      He states he has been evaluated at neuro [facility].  He underwent MRI, MRA imaging of the brain both of which were interpreted as normal.   He also had a carotid ultrasound which was negative for stenosis or vessel anomaly     He underwent a MRI of the cervical spine at outside facility in March 2000.  Images not available.  Based on metallic artifact, a CT of the cervical spine with without contrast was ordered by Neurology.  This study is available for my review today.     He reports remote history of TIA after which he was started on Eliquis by a cardiologist, per the patient     Smoking status:  Nonsmoker    Interval history March 7, 2022:  He endorses ongoing symptoms as above.  He reports no new syncopal events, though he avoids neck extension and rotation.  He denies new  weakness or numbness.  He does endorse intermittent pallor involving the bilateral hands with neck rotation.    He underwent CT angiography of the head and neck which was reviewed.  No significant carotid or vertebral artery stenosis.  Questionable aneurysm/fistula in the right anterior medial middle cranial fossa.    He underwent went plain x-rays of the cervical spine which were reviewed.  Previous ACDF C5-7 without obvious hardware complication.  Degenerative disc changes C4-5.  Left C7 rib noted    Exam:    Awake, alert, oriented to person place and time.    Cranial nerves 2-12 grossly intact.    Strength is graded 5/5 in all muscle groups.    Sensation is grossly intact throughout.    Gait and stance are normal  Absent Elder sign  No hyperreflexia elicited    Analysis:  I have referred him to vascular neurology for evaluation and conventional four-vessel cerebral angiography.  In addition to the intracerebral vessel anomaly described on CT angiography, he may also have findings consistent with thoracic outlet syndrome and/or subclavian steal.  I have also ordered an MRI of the cervical spine relative to adjacent level disc disease at C4-5.  We will see him back in 4-6 weeks for re-evaluation.

## 2022-03-11 ENCOUNTER — LAB VISIT (OUTPATIENT)
Dept: LAB | Facility: HOSPITAL | Age: 62
End: 2022-03-11
Attending: NURSE PRACTITIONER
Payer: MEDICAID

## 2022-03-11 DIAGNOSIS — E78.00 PURE HYPERCHOLESTEROLEMIA: ICD-10-CM

## 2022-03-11 DIAGNOSIS — I10 ESSENTIAL HYPERTENSION: ICD-10-CM

## 2022-03-11 LAB
ALBUMIN SERPL BCP-MCNC: 4 G/DL (ref 3.5–5.2)
ALP SERPL-CCNC: 69 U/L (ref 55–135)
ALT SERPL W/O P-5'-P-CCNC: 20 U/L (ref 10–44)
ANION GAP SERPL CALC-SCNC: 10 MMOL/L (ref 8–16)
AST SERPL-CCNC: 17 U/L (ref 10–40)
BASOPHILS # BLD AUTO: 0.05 K/UL (ref 0–0.2)
BASOPHILS NFR BLD: 0.8 % (ref 0–1.9)
BILIRUB SERPL-MCNC: 0.6 MG/DL (ref 0.1–1)
BUN SERPL-MCNC: 17 MG/DL (ref 8–23)
CALCIUM SERPL-MCNC: 9.2 MG/DL (ref 8.7–10.5)
CHLORIDE SERPL-SCNC: 99 MMOL/L (ref 95–110)
CHOLEST SERPL-MCNC: 141 MG/DL (ref 120–199)
CHOLEST/HDLC SERPL: 3 {RATIO} (ref 2–5)
CO2 SERPL-SCNC: 27 MMOL/L (ref 23–29)
CREAT SERPL-MCNC: 1.4 MG/DL (ref 0.5–1.4)
DIFFERENTIAL METHOD: ABNORMAL
EOSINOPHIL # BLD AUTO: 0.1 K/UL (ref 0–0.5)
EOSINOPHIL NFR BLD: 2.1 % (ref 0–8)
ERYTHROCYTE [DISTWIDTH] IN BLOOD BY AUTOMATED COUNT: 12.7 % (ref 11.5–14.5)
EST. GFR  (AFRICAN AMERICAN): >60 ML/MIN/1.73 M^2
EST. GFR  (NON AFRICAN AMERICAN): 53.5 ML/MIN/1.73 M^2
GLUCOSE SERPL-MCNC: 138 MG/DL (ref 70–110)
HCT VFR BLD AUTO: 46.5 % (ref 40–54)
HDLC SERPL-MCNC: 47 MG/DL (ref 40–75)
HDLC SERPL: 33.3 % (ref 20–50)
HGB BLD-MCNC: 16 G/DL (ref 14–18)
IMM GRANULOCYTES # BLD AUTO: 0.01 K/UL (ref 0–0.04)
IMM GRANULOCYTES NFR BLD AUTO: 0.2 % (ref 0–0.5)
LDLC SERPL CALC-MCNC: 74.8 MG/DL (ref 63–159)
LYMPHOCYTES # BLD AUTO: 1.8 K/UL (ref 1–4.8)
LYMPHOCYTES NFR BLD: 29.1 % (ref 18–48)
MCH RBC QN AUTO: 30.1 PG (ref 27–31)
MCHC RBC AUTO-ENTMCNC: 34.4 G/DL (ref 32–36)
MCV RBC AUTO: 87 FL (ref 82–98)
MONOCYTES # BLD AUTO: 0.5 K/UL (ref 0.3–1)
MONOCYTES NFR BLD: 8.1 % (ref 4–15)
NEUTROPHILS # BLD AUTO: 3.7 K/UL (ref 1.8–7.7)
NEUTROPHILS NFR BLD: 59.7 % (ref 38–73)
NONHDLC SERPL-MCNC: 94 MG/DL
NRBC BLD-RTO: 0 /100 WBC
PLATELET # BLD AUTO: 387 K/UL (ref 150–450)
PMV BLD AUTO: 8.7 FL (ref 9.2–12.9)
POTASSIUM SERPL-SCNC: 4.4 MMOL/L (ref 3.5–5.1)
PROT SERPL-MCNC: 7.4 G/DL (ref 6–8.4)
RBC # BLD AUTO: 5.32 M/UL (ref 4.6–6.2)
SODIUM SERPL-SCNC: 136 MMOL/L (ref 136–145)
TRIGL SERPL-MCNC: 96 MG/DL (ref 30–150)
TSH SERPL DL<=0.005 MIU/L-ACNC: 2.53 UIU/ML (ref 0.34–5.6)
WBC # BLD AUTO: 6.19 K/UL (ref 3.9–12.7)

## 2022-03-11 PROCEDURE — 85025 COMPLETE CBC W/AUTO DIFF WBC: CPT | Performed by: NURSE PRACTITIONER

## 2022-03-11 PROCEDURE — 80053 COMPREHEN METABOLIC PANEL: CPT | Performed by: NURSE PRACTITIONER

## 2022-03-11 PROCEDURE — 36415 COLL VENOUS BLD VENIPUNCTURE: CPT | Performed by: NURSE PRACTITIONER

## 2022-03-11 PROCEDURE — 84443 ASSAY THYROID STIM HORMONE: CPT | Performed by: NURSE PRACTITIONER

## 2022-03-11 PROCEDURE — 80061 LIPID PANEL: CPT | Performed by: NURSE PRACTITIONER

## 2022-03-14 ENCOUNTER — OFFICE VISIT (OUTPATIENT)
Dept: FAMILY MEDICINE | Facility: CLINIC | Age: 62
End: 2022-03-14
Payer: MEDICAID

## 2022-03-14 VITALS
WEIGHT: 315 LBS | SYSTOLIC BLOOD PRESSURE: 122 MMHG | TEMPERATURE: 99 F | HEIGHT: 74 IN | HEART RATE: 71 BPM | OXYGEN SATURATION: 96 % | BODY MASS INDEX: 40.43 KG/M2 | DIASTOLIC BLOOD PRESSURE: 86 MMHG

## 2022-03-14 DIAGNOSIS — R73.09 ELEVATED GLUCOSE: ICD-10-CM

## 2022-03-14 DIAGNOSIS — E78.00 PURE HYPERCHOLESTEROLEMIA: ICD-10-CM

## 2022-03-14 DIAGNOSIS — I10 ESSENTIAL HYPERTENSION: Primary | ICD-10-CM

## 2022-03-14 LAB — HBA1C MFR BLD: 5.5 %

## 2022-03-14 PROCEDURE — 3008F PR BODY MASS INDEX (BMI) DOCUMENTED: ICD-10-PCS | Mod: S$GLB,,, | Performed by: NURSE PRACTITIONER

## 2022-03-14 PROCEDURE — 1160F PR REVIEW ALL MEDS BY PRESCRIBER/CLIN PHARMACIST DOCUMENTED: ICD-10-PCS | Mod: S$GLB,,, | Performed by: NURSE PRACTITIONER

## 2022-03-14 PROCEDURE — 3079F DIAST BP 80-89 MM HG: CPT | Mod: S$GLB,,, | Performed by: NURSE PRACTITIONER

## 2022-03-14 PROCEDURE — 3074F PR MOST RECENT SYSTOLIC BLOOD PRESSURE < 130 MM HG: ICD-10-PCS | Mod: S$GLB,,, | Performed by: NURSE PRACTITIONER

## 2022-03-14 PROCEDURE — 3044F HG A1C LEVEL LT 7.0%: CPT | Mod: S$GLB,,, | Performed by: NURSE PRACTITIONER

## 2022-03-14 PROCEDURE — 3044F PR MOST RECENT HEMOGLOBIN A1C LEVEL <7.0%: ICD-10-PCS | Mod: S$GLB,,, | Performed by: NURSE PRACTITIONER

## 2022-03-14 PROCEDURE — 3008F BODY MASS INDEX DOCD: CPT | Mod: S$GLB,,, | Performed by: NURSE PRACTITIONER

## 2022-03-14 PROCEDURE — 83036 HEMOGLOBIN GLYCOSYLATED A1C: CPT | Mod: QW,,, | Performed by: NURSE PRACTITIONER

## 2022-03-14 PROCEDURE — 99214 OFFICE O/P EST MOD 30 MIN: CPT | Mod: S$GLB,,, | Performed by: NURSE PRACTITIONER

## 2022-03-14 PROCEDURE — 99214 PR OFFICE/OUTPT VISIT, EST, LEVL IV, 30-39 MIN: ICD-10-PCS | Mod: S$GLB,,, | Performed by: NURSE PRACTITIONER

## 2022-03-14 PROCEDURE — 3079F PR MOST RECENT DIASTOLIC BLOOD PRESSURE 80-89 MM HG: ICD-10-PCS | Mod: S$GLB,,, | Performed by: NURSE PRACTITIONER

## 2022-03-14 PROCEDURE — 1160F RVW MEDS BY RX/DR IN RCRD: CPT | Mod: S$GLB,,, | Performed by: NURSE PRACTITIONER

## 2022-03-14 PROCEDURE — 83036 POCT HEMOGLOBIN A1C: ICD-10-PCS | Mod: QW,,, | Performed by: NURSE PRACTITIONER

## 2022-03-14 PROCEDURE — 3074F SYST BP LT 130 MM HG: CPT | Mod: S$GLB,,, | Performed by: NURSE PRACTITIONER

## 2022-03-14 RX ORDER — LOSARTAN POTASSIUM AND HYDROCHLOROTHIAZIDE 12.5; 5 MG/1; MG/1
1 TABLET ORAL DAILY
Qty: 90 TABLET | Refills: 1 | Status: SHIPPED | OUTPATIENT
Start: 2022-03-14 | End: 2022-09-14 | Stop reason: SDUPTHER

## 2022-03-14 RX ORDER — ROSUVASTATIN CALCIUM 10 MG/1
10 TABLET, COATED ORAL NIGHTLY
Qty: 90 TABLET | Refills: 1 | Status: SHIPPED | OUTPATIENT
Start: 2022-03-14 | End: 2022-07-11 | Stop reason: SDUPTHER

## 2022-03-14 NOTE — PROGRESS NOTES
SUBJECTIVE:      Patient ID: Otis Colón is a 61 y.o. male.    Chief Complaint: Hypertension    Mr Colón is here today to f/u on htn and hyperlipidemia. He is following with Dr Thibodeaux for cardiology, and says he stopped the norvasc due to lack of energy. Following with Dr Ibrahim for neck pain. He has been referred to a vascular neurologist for eval of an aneurysm. Glucose elevated on labs    Hypertension  This is a chronic problem. The current episode started more than 1 year ago. The problem is unchanged. The problem is controlled. Associated symptoms include neck pain. Pertinent negatives include no anxiety, chest pain, headaches, palpitations, peripheral edema or shortness of breath. Risk factors for coronary artery disease include obesity, male gender and dyslipidemia. Past treatments include diuretics, angiotensin blockers, calcium channel blockers and beta blockers. The current treatment provides moderate improvement.   Hyperlipidemia  This is a chronic problem. The current episode started more than 1 year ago. The problem is controlled. Recent lipid tests were reviewed and are normal. Pertinent negatives include no chest pain or shortness of breath. Current antihyperlipidemic treatment includes statins. The current treatment provides significant improvement of lipids. Risk factors for coronary artery disease include hypertension, male sex, obesity and dyslipidemia.       Past Surgical History:   Procedure Laterality Date    APPENDECTOMY      CERVICAL FUSION  2009    CROSS FINGER FLAP      FRACTURE SURGERY      steel librado in right leg    KNEE ARTHROSCOPY Right     ORTHOPEDIC SURGERY Right     librado from knee to ankle    RHIZOTOMY  08/2016    SPINE SURGERY       Family History   Problem Relation Age of Onset    Hypertension Mother     No Known Problems Sister     No Known Problems Brother       Social History     Socioeconomic History    Marital status:    Tobacco Use    Smoking  status: Never Smoker    Smokeless tobacco: Never Used   Substance and Sexual Activity    Alcohol use: No    Drug use: No    Sexual activity: Yes     Partners: Female     Current Outpatient Medications   Medication Sig Dispense Refill    apixaban (ELIQUIS) 5 mg Tab Take 5 mg by mouth 2 (two) times daily.      coQ10, ubiquinol, 100 mg Cap Take 1 capsule by mouth once daily. 90 capsule 0    metoprolol succinate (TOPROL-XL) 25 MG 24 hr tablet Take 25 mg by mouth once daily.      omeprazole (PRILOSEC) 40 MG capsule TAKE 1 CAPSULE(40 MG) BY MOUTH EVERY DAY 30 capsule 1    losartan-hydrochlorothiazide 50-12.5 mg (HYZAAR) 50-12.5 mg per tablet Take 1 tablet by mouth once daily. 90 tablet 1    rosuvastatin (CRESTOR) 10 MG tablet Take 1 tablet (10 mg total) by mouth every evening. 90 tablet 1     No current facility-administered medications for this visit.     Review of patient's allergies indicates:  No Known Allergies   Past Medical History:   Diagnosis Date    CAD (coronary artery disease) 1/13/2017    Cervical spinal stenosis     Hyperlipidemia     Hypertension     Migraine headache      Past Surgical History:   Procedure Laterality Date    APPENDECTOMY      CERVICAL FUSION  2009    CROSS FINGER FLAP      FRACTURE SURGERY      steel librado in right leg    KNEE ARTHROSCOPY Right     ORTHOPEDIC SURGERY Right     librado from knee to ankle    RHIZOTOMY  08/2016    SPINE SURGERY         Review of Systems   Constitutional: Negative for appetite change, chills, diaphoresis and unexpected weight change.   HENT: Negative for ear discharge, facial swelling, hearing loss, nosebleeds and trouble swallowing.    Eyes: Negative for photophobia, pain and visual disturbance.   Respiratory: Negative for apnea, choking, shortness of breath and wheezing.    Cardiovascular: Negative for chest pain and palpitations.   Gastrointestinal: Negative for abdominal pain, blood in stool and vomiting.   Endocrine: Negative for  "polyphagia.   Genitourinary: Negative for difficulty urinating and hematuria.   Musculoskeletal: Positive for neck pain. Negative for gait problem and joint swelling.   Skin: Negative for pallor.   Neurological: Negative for seizures, speech difficulty and headaches.   Hematological: Does not bruise/bleed easily.   Psychiatric/Behavioral: Negative for agitation, confusion, self-injury and sleep disturbance. The patient is not nervous/anxious.       OBJECTIVE:      Vitals:    03/14/22 0826 03/14/22 0843   BP: (!) 120/90 122/86   Pulse: 71    Temp: 99 °F (37.2 °C)    SpO2: 96%    Weight: (!) 152.4 kg (336 lb)    Height: 6' 2" (1.88 m)      Physical Exam  Vitals and nursing note reviewed.   Constitutional:       General: He is not in acute distress.     Appearance: He is well-developed.   HENT:      Head: Normocephalic and atraumatic.      Nose: Nose normal.      Mouth/Throat:      Pharynx: Uvula midline.   Eyes:      General: Lids are normal.      Conjunctiva/sclera: Conjunctivae normal.      Pupils: Pupils are equal, round, and reactive to light.      Right eye: Pupil is round and reactive.      Left eye: Pupil is round and reactive.   Neck:      Thyroid: No thyromegaly.      Vascular: No carotid bruit.   Cardiovascular:      Rate and Rhythm: Normal rate and regular rhythm.      Pulses: Normal pulses.      Heart sounds: Normal heart sounds. No murmur heard.  Pulmonary:      Effort: Pulmonary effort is normal.      Breath sounds: Normal breath sounds. No wheezing, rhonchi or rales.   Abdominal:      General: Bowel sounds are normal.      Palpations: Abdomen is soft. Abdomen is not rigid.      Tenderness: There is no abdominal tenderness.   Musculoskeletal:         General: Normal range of motion.      Cervical back: Normal range of motion and neck supple.      Right lower leg: No edema.      Left lower leg: No edema.   Lymphadenopathy:      Cervical: No cervical adenopathy.   Skin:     General: Skin is warm and dry. "      Nails: There is no clubbing.   Neurological:      Mental Status: He is alert and oriented to person, place, and time.   Psychiatric:         Mood and Affect: Mood normal.         Speech: Speech normal.         Behavior: Behavior normal. Behavior is cooperative.         Thought Content: Thought content normal.         Office Visit on 03/14/2022   Component Date Value Ref Range Status    Hemoglobin A1C 03/14/2022 5.5  % Final   Lab Visit on 03/11/2022   Component Date Value Ref Range Status    WBC 03/11/2022 6.19  3.90 - 12.70 K/uL Final    RBC 03/11/2022 5.32  4.60 - 6.20 M/uL Final    Hemoglobin 03/11/2022 16.0  14.0 - 18.0 g/dL Final    Hematocrit 03/11/2022 46.5  40.0 - 54.0 % Final    MCV 03/11/2022 87  82 - 98 fL Final    MCH 03/11/2022 30.1  27.0 - 31.0 pg Final    MCHC 03/11/2022 34.4  32.0 - 36.0 g/dL Final    RDW 03/11/2022 12.7  11.5 - 14.5 % Final    Platelets 03/11/2022 387  150 - 450 K/uL Final    MPV 03/11/2022 8.7 (A) 9.2 - 12.9 fL Final    Immature Granulocytes 03/11/2022 0.2  0.0 - 0.5 % Final    Gran # (ANC) 03/11/2022 3.7  1.8 - 7.7 K/uL Final    Immature Grans (Abs) 03/11/2022 0.01  0.00 - 0.04 K/uL Final    Comment: Mild elevation in immature granulocytes is non specific and   can be seen in a variety of conditions including stress response,   acute inflammation, trauma and pregnancy. Correlation with other   laboratory and clinical findings is essential.      Lymph # 03/11/2022 1.8  1.0 - 4.8 K/uL Final    Mono # 03/11/2022 0.5  0.3 - 1.0 K/uL Final    Eos # 03/11/2022 0.1  0.0 - 0.5 K/uL Final    Baso # 03/11/2022 0.05  0.00 - 0.20 K/uL Final    nRBC 03/11/2022 0  0 /100 WBC Final    Gran % 03/11/2022 59.7  38.0 - 73.0 % Final    Lymph % 03/11/2022 29.1  18.0 - 48.0 % Final    Mono % 03/11/2022 8.1  4.0 - 15.0 % Final    Eosinophil % 03/11/2022 2.1  0.0 - 8.0 % Final    Basophil % 03/11/2022 0.8  0.0 - 1.9 % Final    Differential Method 03/11/2022 Automated   Final     Sodium 03/11/2022 136  136 - 145 mmol/L Final    Potassium 03/11/2022 4.4  3.5 - 5.1 mmol/L Final    Chloride 03/11/2022 99  95 - 110 mmol/L Final    CO2 03/11/2022 27  23 - 29 mmol/L Final    Glucose 03/11/2022 138 (A) 70 - 110 mg/dL Final    BUN 03/11/2022 17  8 - 23 mg/dL Final    Creatinine 03/11/2022 1.4  0.5 - 1.4 mg/dL Final    Calcium 03/11/2022 9.2  8.7 - 10.5 mg/dL Final    Total Protein 03/11/2022 7.4  6.0 - 8.4 g/dL Final    Albumin 03/11/2022 4.0  3.5 - 5.2 g/dL Final    Total Bilirubin 03/11/2022 0.6  0.1 - 1.0 mg/dL Final    Comment: For infants and newborns, interpretation of results should be based  on gestational age, weight and in agreement with clinical  observations.    Premature Infant recommended reference ranges:  Up to 24 hours.............<8.0 mg/dL  Up to 48 hours............<12.0 mg/dL  3-5 days..................<15.0 mg/dL  6-29 days.................<15.0 mg/dL      Alkaline Phosphatase 03/11/2022 69  55 - 135 U/L Final    AST 03/11/2022 17  10 - 40 U/L Final    ALT 03/11/2022 20  10 - 44 U/L Final    Anion Gap 03/11/2022 10  8 - 16 mmol/L Final    eGFR if African American 03/11/2022 >60.0  >60 mL/min/1.73 m^2 Final    eGFR if non African American 03/11/2022 53.5 (A) >60 mL/min/1.73 m^2 Final    Comment: Calculation used to obtain the estimated glomerular filtration  rate (eGFR) is the CKD-EPI equation.       Cholesterol 03/11/2022 141  120 - 199 mg/dL Final    Comment: The National Cholesterol Education Program (NCEP) has set the  following guidelines (reference ranges) for Cholesterol:  Optimal.....................<200 mg/dL  Borderline High.............200-239 mg/dL  High........................> or = 240 mg/dL      Triglycerides 03/11/2022 96  30 - 150 mg/dL Final    Comment: The National Cholesterol Education Program (NCEP) has set the  following guidelines (reference values) for triglycerides:  Normal......................<150 mg/dL  Borderline  High.............150-199 mg/dL  High........................200-499 mg/dL      HDL 03/11/2022 47  40 - 75 mg/dL Final    Comment: The National Cholesterol Education Program (NCEP) has set the  following guidelines (reference values) for HDL Cholesterol:  Low...............<40 mg/dL  Optimal...........>60 mg/dL      LDL Cholesterol 03/11/2022 74.8  63.0 - 159.0 mg/dL Final    Comment: The National Cholesterol Education Program (NCEP) has set the  following guidelines (reference values) for LDL Cholesterol:  Optimal.......................<130 mg/dL  Borderline High...............130-159 mg/dL  High..........................160-189 mg/dL  Very High.....................>190 mg/dL      HDL/Cholesterol Ratio 03/11/2022 33.3  20.0 - 50.0 % Final    Total Cholesterol/HDL Ratio 03/11/2022 3.0  2.0 - 5.0 Final    Non-HDL Cholesterol 03/11/2022 94  mg/dL Final    Comment: Risk category and Non-HDL cholesterol goals:  Coronary heart disease (CHD)or equivalent (10-year risk of CHD >20%):  Non-HDL cholesterol goal     <130 mg/dL  Two or more CHD risk factors and 10-year risk of CHD <= 20%:  Non-HDL cholesterol goal     <160 mg/dL  0 to 1 CHD risk factor:  Non-HDL cholesterol goal     <190 mg/dL      TSH 03/11/2022 2.530  0.340 - 5.600 uIU/mL Final   Hospital Outpatient Visit on 02/22/2022   Component Date Value Ref Range Status    POC Creatinine 02/22/2022 1.2  0.5 - 1.4 mg/dL Final    Sample 02/22/2022 unknown   Final   ]  Assessment:       1. Essential hypertension    2. Pure hypercholesterolemia    3. Elevated glucose        Plan:       Essential hypertension  -     losartan-hydrochlorothiazide 50-12.5 mg (HYZAAR) 50-12.5 mg per tablet; Take 1 tablet by mouth once daily.  Dispense: 90 tablet; Refill: 1  He will see Dr Thibodeaux this month and will discuss BP at that time    Pure hypercholesterolemia  -     rosuvastatin (CRESTOR) 10 MG tablet; Take 1 tablet (10 mg total) by mouth every evening.  Dispense: 90 tablet;  Refill: 1    Elevated glucose  -     Hemoglobin A1C, POCT                              5.5      No follow-ups on file.      3/14/2022 PHILLIP Rivera, FNP

## 2022-03-17 ENCOUNTER — TELEPHONE (OUTPATIENT)
Dept: NEUROLOGY | Facility: CLINIC | Age: 62
End: 2022-03-17
Payer: MEDICAID

## 2022-03-17 NOTE — TELEPHONE ENCOUNTER
----- Message from Elsa Ashby sent at 3/17/2022  3:50 PM CDT -----  Contact: Patient  Type:  Sooner Appointment Request      Name of Caller:Patient   When is the first available appointment?10/16/2022  Symptoms:Referral on active request   Would the patient rather a call back or a response via [facility]? Call back   Best Call Back Number:[phone]  Additional Information: Please assist

## 2022-03-21 ENCOUNTER — TELEPHONE (OUTPATIENT)
Dept: NEUROSURGERY | Facility: CLINIC | Age: 62
End: 2022-03-21
Payer: MEDICAID

## 2022-03-21 DIAGNOSIS — Q28.3: Primary | ICD-10-CM

## 2022-03-21 DIAGNOSIS — M54.2 NECK PAIN: ICD-10-CM

## 2022-03-21 NOTE — TELEPHONE ENCOUNTER
Returned call to pt. He reports that he has not been evaluated by Vascular Neurology yet and that his insurance denied the MRI Cspine ordered by Dr. Ibrahim. Informed pt that I will contact Vascular Neurology to request an appt and find out the reason of denial of the MRI. Informed pt that I will call him back. Pt voiced understanding.

## 2022-03-21 NOTE — TELEPHONE ENCOUNTER
I spoke a an employee in Neurology and placed new referral to [facility] to [facility] Neurology. Informed pt that I will make sure appt gets scheduled. Also informed pt that Dr. Ibrahim will place or for therapy so that there is documentation of attempting and/or completing conservative treatment prior to resubmitting new referral for MRI Cspine. Pt voiced understanding.

## 2022-03-21 NOTE — TELEPHONE ENCOUNTER
----- Message from Betty Morris RN sent at 3/18/2022  2:00 PM CDT -----    ----- Message -----  From: Chancellor Eduardo  Sent: 3/18/2022  10:51 AM CDT  To: Pat[facility] Castro Staff    Type:  Needs Medical Advice    Who Called: PT  Would the patient rather a call back or a response via [facility]? Callback   Best Call Back Number: [phone]  Additional Information: Pt requesting callback to do discuss recently cancelled referred doctor's appts. Pt is trying to figure out what to do now.

## 2022-03-25 ENCOUNTER — TELEPHONE (OUTPATIENT)
Dept: NEUROLOGY | Facility: CLINIC | Age: 62
End: 2022-03-25
Payer: MEDICAID

## 2022-03-25 ENCOUNTER — TELEPHONE (OUTPATIENT)
Dept: NEUROSURGERY | Facility: CLINIC | Age: 62
End: 2022-03-25
Payer: MEDICAID

## 2022-03-25 NOTE — TELEPHONE ENCOUNTER
----- Message from Concepcion Olivares sent at 3/25/2022 12:01 PM CDT -----  Regarding: Call back request  Contact: Betty Hernández needs pt scheduled from referral       Betty Morris message on teams best way

## 2022-03-25 NOTE — TELEPHONE ENCOUNTER
----- Message from Chaya Garces MA sent at 3/25/2022  2:45 PM CDT -----  Regarding: FW: Call back request  Contact: Betty  Message sent to Neurosurgery patient needs Vascular Neurology.    Thanks  ----- Message -----  From: Concepcion Olivares  Sent: 3/25/2022  12:02 PM CDT  To: [facility] Neurosurgery Triage  Subject: Call back request                                Betty needs pt scheduled from referral       Betty Morris message on teams best way

## 2022-03-28 ENCOUNTER — TELEPHONE (OUTPATIENT)
Dept: NEUROSURGERY | Facility: CLINIC | Age: 62
End: 2022-03-28
Payer: MEDICAID

## 2022-03-28 ENCOUNTER — CLINICAL SUPPORT (OUTPATIENT)
Dept: REHABILITATION | Facility: HOSPITAL | Age: 62
End: 2022-03-28
Attending: NEUROLOGICAL SURGERY
Payer: MEDICAID

## 2022-03-28 DIAGNOSIS — M54.2 NECK PAIN: ICD-10-CM

## 2022-03-28 DIAGNOSIS — R29.898 DECREASED RANGE OF MOTION OF NECK: ICD-10-CM

## 2022-03-28 DIAGNOSIS — M89.9 SCAPULAR DYSFUNCTION: ICD-10-CM

## 2022-03-28 DIAGNOSIS — R29.3 ABNORMAL POSTURE: Primary | ICD-10-CM

## 2022-03-28 PROCEDURE — 97162 PT EVAL MOD COMPLEX 30 MIN: CPT | Mod: PN

## 2022-03-28 NOTE — TELEPHONE ENCOUNTER
Returned call to pt. He reports that he has not been contacted by Vascular or Neurology to schedule from referral. Informed pt that I have spoken with Vascular to request appt and was informed that message was being sent to Neurology for scheduling. Pt reports that he is schedule to start therapy this week. Informed pt to contact our office when therapy is almost complete so that new MRI can be ordered and sent to insurance for approval. Requested pt contact our office if he is not scheduled to see Neurology or Vascular by the end of this week. Pt voiced understanding.

## 2022-03-28 NOTE — PLAN OF CARE
[facility]   Physical Therapy Initial Evaluation     Date: 3/28/2022   Name: Otis Raygoza [place]  Clinic Number: [id number]    Therapy Diagnosis:   Encounter Diagnoses   Name Primary?    Neck pain     Abnormal posture Yes    Decreased range of motion of neck     Scapular dysfunction      Physician: Matthew Ibrahim DO    Physician Orders: PT Eval and Treat   Medical Diagnosis from Referral: Neck pain [M54.2]  Evaluation Date: 3/28/2022  Authorization Period Expiration: 3/21/2023  Plan of Care Expiration: 5/23/2022  Progress Note Due: 4/28/2022  Visit # / Visits authorized: 1/ 1   FOTO Follow Up:   FOTO Follow UP:     Precautions: HTN and Headaches    Time In: 1334  Time Out: 1411  Total Appointment Time (timed & untimed codes): 36 minutes      SUBJECTIVE   Date of onset: 2015    History of current condition - Otis reports: a long history of neck pain from a work injury back in 2005. He underwent a cervical fusion of C4-6 in 2009. Over the past several years though, he has developed neck pain at the base of his neck and also the base of his occiput. He does note tingling in bilateral upper extremities. He attempted physical therapy 9 months ago, but remained with pain and feeling as though he would pass out when looking down or turning his head to the right. He was diagnosed with a brain aneurysm and is awaiting referral to a cardiovascular neurology. He was also diagnosed with a cervical rib. Otis remains with neck pain which limits his ability to complete his daily activities.       Falls: [employer] lot didn't notice a pot hole a few months back    Imaging, x-ray cervical spine  FINDINGS:  There are postsurgical changes of ACDF from C5 through C7.  The C7-T1 disc space is obscured by overlying structures.  Anterior fusion plate is well as the C5 and C6 fixation screws appear intact as do the interbody fusion devices.  The C7 screws are suboptimally evaluated.  The vertebral  bodies maintain normal height.  Fusion appears solid.  There is anterior osteophyte formation at the C4-5 level.  The facet joints maintain normal articulation.  The odontoid process is intact.  There is a left C7 cervical rib.  There is at least mild multilevel foraminal narrowing.  There is no instability demonstrated with flexion or extension.    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      Pain:  Current 5/10, worst 8/10, best 3/10   Location: bilateral neck   Description: Aching, Dull and Burning  Aggravating Factors: Lifting, turning head, looking down for prolonged periods of time  Easing Factors: relaxation     Pts goals: patient would like to be able to perform all lifting Activities of Daily Living with no symptoms.          Medical History:   Past Medical History:   Diagnosis Date    CAD (coronary artery disease) 1/13/2017    Cervical spinal stenosis     Hyperlipidemia     Hypertension     Migraine headache        Surgical History:   Otis Colón  has a past surgical history that includes Cervical fusion (2009); Appendectomy; Cross finger flap; Rhizotomy (08/2016); Knee arthroscopy (Right); orthopedic surgery (Right); Fracture surgery; and Spine surgery.    Medications:   Otis has a current medication list which includes the following prescription(s): apixaban, coq10 (ubiquinol), losartan-hydrochlorothiazide 50-12.5 mg, metoprolol succinate, omeprazole, and rosuvastatin.    Allergies:   Review of patient's  allergies indicates:  No Known Allergies       OBJECTIVE     Posture: downwardly rotated scapula bilaterally      Dermatomes Right Left Comments   C1 Intact    Intact     C2 Intact Intact     C3 Intact Intact     C4 Intact Intact     C5 Impaired: decreased light touch Intact     C6 Intact Impaired: decreased light touch     C7 Impaired: decreased light touch Impaired: decreased light touch     C8 Intact Impaired: decreased light touch     T1 Intact Intact           Myotomes RULAURI LULAURI Comments   C1 5/5    5/5        C2 5/5    5/5        C3 5/5    5/5        C4 5/5    5/5        C5 5/5    5/5        C6 5/5    5/5        C7 5/5    5/5        C8 5/5    5/5        T1 5/5 5/5        Reflexes Positive Right  Positive Left Comments   Triceps 1+ 1+     Biceps 1+ 1+     Brachioradialis 1+ 1+           Active Range of Motion (AROM)/Passive Range of Motion (PROM):      Cervical AROM (Degrees) Comments   Flexion 35     Extension 40     Right Rotation 50     Left Rotation 45           Joint Accessory: decreased cervicothoracic junction extension, decreased OA flexion         Manual Muscle Testing:         Manual Muscle Testing SANJEEV ROSENBERG Comments   Upper Trap 4-/5    4-/5        Middle Trap 3/5    3/5        Lower Trap 3-/5    3-/5        Rhomboids 4+/5    4+/5        Serratus Anterior 4/5    4/5                        Special Tests:     Cervical Results Comments   Transverse Ligament Negative.     Vertebral Artery Testing Positive. R end range rotation reproduced symptoms of lightheadedness    Alar Ligament Testing Negative.           ULTT Right  Left Comments   Median Negative. Negative.              Limitation/Restriction for FOTO Neck Survey     Therapist reviewed FOTO scores for Otis Raygoza  on 5/3/2021.   FOTO documents entered into EPIC - see Media section.     Limitation Score: not captured on initial eval           TREATMENT     Total Treatment time (time-based codes) separate from Evaluation: 5 minutes      Otis  received the treatments listed below:      therapeutic exercises to develop strength, endurance, ROM, flexibility and posture for 5 minutes including:    Education on Home exercise program     manual therapy techniques: Joint mobilizations were applied to the: cervical and thoracic spine for 0 minutes, including:        PATIENT EDUCATION AND HOME EXERCISES     Education provided:   - educated on impairments identified at initial evaluation and prognosis     Written Home Exercises Provided: yes. Exercises were reviewed and Otis was able to demonstrate them prior to the end of the session.  Otis demonstrated good  understanding of the education provided. See EMR under Patient Instructions for exercises provided during therapy sessions.    ASSESSMENT     Otis is a 61 y.o. male referred to outpatient Physical Therapy with a medical diagnosis of Neck pain [M54.2]. Patient presents with decreased cervical range of motion, decreased strength, impaired posture and pain which limits his ability to complete his Activities of Daily Living. He will be placed in the neck pain with movement coordination impairments treatment based classification based on the chronicity of his symptoms and above noted impairments.     Patient prognosis is Fair.   Patientt will benefit from skilled outpatient Physical Therapy to address the deficits stated above and in the chart below, provide patient /family education, and to maximize patientt's level of independence.     Plan of care discussed with patient: Yes  Patient's spiritual, cultural and educational needs considered and patient is agreeable to the plan of care and goals as stated below:     Anticipated Barriers for therapy: none    Medical Necessity is demonstrated by the following  History  Co-morbidities and personal factors that may impact the plan of care Co-morbidities:   HTN and prior cervical fusion     Personal Factors:   no deficits       moderate   Examination  Body  Structures and Functions, activity limitations and participation restrictions that may impact the plan of care Body Regions:   neck  upper extremities     Body Systems:    ROM  strength  motor control     Participation Restrictions:   Patient is unable to lift objects without pain in his neck and symptoms in his head     Activity limitations:   Learning and applying knowledge  no deficits     General Tasks and Commands  no deficits     Communication  no deficits     Mobility  lifting and carrying objects     Self care  no deficits     Domestic Life  doing house work (cleaning house, washing dishes, laundry)     Interactions/Relationships  no deficits     Life Areas  no deficits     Community and Social Life  recreation and leisure             high   Clinical Presentation evolving clinical presentation with changing clinical characteristics moderate   Decision Making/ Complexity Score: moderate      Goals:  Short term goals: 4 weeks  1. Patient will be independent and compliant with their HEP to improve their function  2. Patient will improve their ROM to at least 50 degrees of cervical extension to improve his ability to look up.   3. Patient will improve their strength to at least a 3+/5 for all rolanda-scapular musculature to improve his ability to lift and carry objects.      Long term goals: 8 weeks  1. Patient will improve their FOTO limitation score by 9 points as evidence of clinically significant improvements in their function.  2. Patient will improve their ROM to at least 60 degrees cervical rotation as evidence of improved cervical mobility to improve his ability to complete a shoulder check while driving.   3. Patient will improve their strength to at least a 4-/5 for rolanda-scapular musculature to improve his posture and ability to lift objects.         PLAN   Plan of care Certification: 3/28/2022 to 5/23/2022.    Outpatient Physical Therapy 1-2 times weekly for 8 weeks to include the following  interventions: Manual Therapy, Neuromuscular Re-ed, Patient Education, Therapeutic Activities and Therapeutic Exercise.     Shon Aldridge, PT  Board Certified in Orthopedic Physical Therapy  Fellow, [employer] Orth[employer] Manual Physical Therapists      I CERTIFY THE NEED FOR THESE SERVICES FURNISHED UNDER THIS PLAN OF TREATMENT AND WHILE UNDER MY CARE   Physician's comments:     Physician's Signature: ___________________________________________________

## 2022-03-28 NOTE — TELEPHONE ENCOUNTER
----- Message from Stacey M Lefort sent at 3/28/2022  8:34 AM CDT -----  Pt left a vm on Friday at 3:40 - he can be reached at [phone].  Thank you.

## 2022-03-30 NOTE — TELEPHONE ENCOUNTER
Attempted to contact pt to inform him that a voice message was left by Neurology with contact information to schedule appt.

## 2022-04-04 ENCOUNTER — CLINICAL SUPPORT (OUTPATIENT)
Dept: REHABILITATION | Facility: HOSPITAL | Age: 62
End: 2022-04-04
Attending: NEUROLOGICAL SURGERY
Payer: MEDICAID

## 2022-04-04 DIAGNOSIS — M89.9 SCAPULAR DYSFUNCTION: Primary | ICD-10-CM

## 2022-04-04 DIAGNOSIS — R29.3 ABNORMAL POSTURE: ICD-10-CM

## 2022-04-04 DIAGNOSIS — R29.898 DECREASED RANGE OF MOTION OF NECK: ICD-10-CM

## 2022-04-04 PROCEDURE — 97110 THERAPEUTIC EXERCISES: CPT | Mod: PN

## 2022-04-05 ENCOUNTER — TELEPHONE (OUTPATIENT)
Dept: NEUROLOGY | Facility: CLINIC | Age: 62
End: 2022-04-05
Payer: MEDICAID

## 2022-04-05 NOTE — TELEPHONE ENCOUNTER
Called both numbers for pt to schedule from referral from Dr. Ibrahim. Left  with clinic number on both numbers to return call to schedule. Dr. Thomson office notified.

## 2022-04-05 NOTE — PROGRESS NOTES
[facility]   Physical Therapy Treatment Note     Name: Otis Raygoza [place]  Clinic Number: [id number]    Therapy Diagnosis:   Encounter Diagnoses   Name Primary?    Scapular dysfunction Yes    Decreased range of motion of neck     Abnormal posture      Physician: Matthew Ibrahim DO    Visit Date: 4/4/2022    [copy and paste header from yulia here]Physician Orders: PT Eval and Treat   Medical Diagnosis from Referral: Neck pain [M54.2]  Evaluation Date: 3/28/2022  Authorization Period Expiration: 3/21/2023  Plan of Care Expiration: 5/23/2022  Progress Note Due: 4/28/2022  Visit # / Visits authorized: 1/16   FOTO Follow Up:   FOTO Follow UP:      Precautions: HTN and Headaches, history of cervical fusion C5-7      PTA Visit #: 0/5     Time In: 1647  Time Out: 1729  Total Billable Time: 14 minutes    SUBJECTIVE     Pt reports: he continues to experience neck pain which limits him.  He was compliant with home exercise program.  Response to previous treatment: no change  Functional change: no change    Pain: 6/10  Location: bilateral neck      OBJECTIVE     Objective Measures updated at progress report unless specified.     Treatment     Otis received the treatments listed below:      Otis received 1:1 therapeutic exercises to develop strength, endurance, ROM, flexibility and posture for 3 minutes including:    Mid trap I, 2x12  Low trap I (muscle activation only), 2x12  Touchdown III, 2x10  Thoracic extensions over chair, 2x10 5 second holds     Otis received the following manual therapy techniques: Joint mobilizations were applied to the: cervical spine and thoracic spine for 11 minutes, including:    OA flexion mobilizations, grade III  Prone cervicothoracic junction gapping, grade III  Prone thoracic posterior to anterior glides, grade II-III    Patient Education and Home Exercises     Home Exercises Provided and Patient Education Provided     Education provided:   - continue with  Home exercise program   - awareness of posture    Written Home Exercises Provided: Patient instructed to cont prior HEP. Exercises were reviewed and Otis was able to demonstrate them prior to the end of the session.  Otis demonstrated good  understanding of the education provided. See EMR under Patient Instructions for exercises provided during therapy sessions    ASSESSMENT     Otis remains with limited cervical and thoracic mobility so mobilizations were performed to improve his joint mobility and range of motion. He requires cueing for all exercises for proper muscle activation and exercise technique. He was unable to complete shoulder range of motion for low trap I due to shoulder pain, so only scapula motion was utilized for this exercise.     Otis Is progressing well towards his goals.   Pt prognosis is Fair.     Pt will continue to benefit from skilled outpatient physical therapy to address the deficits listed in the problem list box on initial evaluation, provide pt/family education and to maximize pt's level of independence in the home and community environment.     Pt's spiritual, cultural and educational needs considered and pt agreeable to plan of care and goals.     Anticipated barriers to physical therapy: none    Goals:   Short term goals: 4 weeks  1. Patient will be independent and compliant with their HEP to improve their function. (progressing)  2. Patient will improve their ROM to at least 50 degrees of cervical extension to improve his ability to look up. (progressing)  3. Patient will improve their strength to at least a 3+/5 for all rolanda-scapular musculature to improve his ability to lift and carry objects. (progressing)     Long term goals: 8 weeks  1. Patient will improve their FOTO limitation score by 9 points as evidence of clinically significant improvements in their function. (progressing)  2. Patient will improve their ROM to at least 60 degrees cervical rotation as evidence of  improved cervical mobility to improve his ability to complete a shoulder check while driving. (progressing)  3. Patient will improve their strength to at least a 4-/5 for rolanda-scapular musculature to improve his posture and ability to lift objects. (progressing)      PLAN     Plan of care Certification: 3/28/2022 to 5/23/2022.     Deep neck flexor muscle activation training, scapular upward rotator strengthening, thoracic and cervicothoracic junction mobility training and posture re-education      Shon Aldridge, PT   Board Certified in Orthopedic Physical Therapy  Fellow, American Academy [employer] Orth[employer] Manual Physical Therapists

## 2022-04-11 ENCOUNTER — CLINICAL SUPPORT (OUTPATIENT)
Dept: REHABILITATION | Facility: HOSPITAL | Age: 62
End: 2022-04-11
Attending: NEUROLOGICAL SURGERY
Payer: MEDICAID

## 2022-04-11 DIAGNOSIS — M89.9 SCAPULAR DYSFUNCTION: Primary | ICD-10-CM

## 2022-04-11 PROCEDURE — 97110 THERAPEUTIC EXERCISES: CPT | Mod: PN

## 2022-04-11 NOTE — PROGRESS NOTES
[facility]   Physical Therapy Treatment Note     Name: Otis Raygoza [place]  Clinic Number: [id number]    Therapy Diagnosis:   Encounter Diagnosis   Name Primary?    Scapular dysfunction Yes     Physician: Matthew Ibrahim DO    Visit Date: 4/11/2022    [copy and paste header from yulia here]Physician Orders: PT Eval and Treat   Medical Diagnosis from Referral: Neck pain [M54.2]  Evaluation Date: 3/28/2022  Authorization Period Expiration: 3/21/2023  Plan of Care Expiration: 5/23/2022  Progress Note Due: 4/28/2022  Visit # / Visits authorized: 2/16   FOTO Follow Up:   FOTO Follow UP:      Precautions: HTN and Headaches, history of cervical fusion C5-7      PTA Visit #: 0/5     Time In: 1256  Time Out:1342  Total Billable Time: 40 minutes    SUBJECTIVE     Pt reports: that he still feels similar pain at the base of his neck.   He was compliant with home exercise program.  Response to previous treatment: no change  Functional change: no change    Pain: 6/10  Location: bilateral neck      OBJECTIVE     Objective Measures updated at progress report unless specified.     Treatment     Otis received the treatments listed below:      Otis received 1:1 therapeutic exercises to develop strength, endurance, ROM, flexibility and posture for 25 minutes including:    Chin tucks with arm raises, 10x each    Open book stretch, 12x each   Mid trap I, 2x12  Touchdown III, 2x10  Thoracic extensions over chair with feet on step, x15 5 second holds     Otis received the following manual therapy techniques: Joint mobilizations were applied to the: cervical spine and thoracic spine for 15 minutes, including:    OA flexion mobilizations, grade III  Supine cervicothoracic junction extension mobilization, grade IV  Prone cervicothoracic junction gapping, grade III  Prone thoracic posterior to anterior glides, grade II-III    Patient Education and Home Exercises     Home Exercises Provided and Patient Education  Provided     Education provided:   - continue with Home exercise program   - awareness of posture    Written Home Exercises Provided: Patient instructed to cont prior HEP. Exercises were reviewed and Otis was able to demonstrate them prior to the end of the session.  Otis demonstrated good  understanding of the education provided. See EMR under Patient Instructions for exercises provided during therapy sessions    ASSESSMENT     Otis remains with decreased cervicothoracic and thoracic mobility so further mobilizations were completed today to offload his neck. He continues to require cueing for proper posture throughout his session. He was progressed to further deep neck flexor muscle activation for local stability to improve his symptoms.     Otis Is progressing well towards his goals.   Pt prognosis is Fair.     Pt will continue to benefit from skilled outpatient physical therapy to address the deficits listed in the problem list box on initial evaluation, provide pt/family education and to maximize pt's level of independence in the home and community environment.     Pt's spiritual, cultural and educational needs considered and pt agreeable to plan of care and goals.     Anticipated barriers to physical therapy: none    Goals:   Short term goals: 4 weeks  1. Patient will be independent and compliant with their HEP to improve their function. (progressing)  2. Patient will improve their ROM to at least 50 degrees of cervical extension to improve his ability to look up. (progressing)  3. Patient will improve their strength to at least a 3+/5 for all rolanda-scapular musculature to improve his ability to lift and carry objects. (progressing)     Long term goals: 8 weeks  1. Patient will improve their FOTO limitation score by 9 points as evidence of clinically significant improvements in their function. (progressing)  2. Patient will improve their ROM to at least 60 degrees cervical rotation as evidence of improved  cervical mobility to improve his ability to complete a shoulder check while driving. (progressing)  3. Patient will improve their strength to at least a 4-/5 for rolanda-scapular musculature to improve his posture and ability to lift objects. (progressing)      PLAN     Plan of care Certification: 3/28/2022 to 5/23/2022.     Deep neck flexor muscle activation training, scapular upward rotator strengthening, thoracic and cervicothoracic junction mobility training and posture re-education      Shon Aldridge, PT   Board Certified in Orthopedic Physical Therapy  Fellow, [employer] Academy [employer] Orth[employer] Manual Physical Therapists

## 2022-04-12 ENCOUNTER — OFFICE VISIT (OUTPATIENT)
Dept: NEUROLOGY | Facility: CLINIC | Age: 62
End: 2022-04-12
Payer: MEDICAID

## 2022-04-12 VITALS
SYSTOLIC BLOOD PRESSURE: 124 MMHG | HEIGHT: 74 IN | WEIGHT: 315 LBS | DIASTOLIC BLOOD PRESSURE: 85 MMHG | BODY MASS INDEX: 40.43 KG/M2 | HEART RATE: 56 BPM

## 2022-04-12 DIAGNOSIS — Q28.3: Primary | ICD-10-CM

## 2022-04-12 DIAGNOSIS — I10 ESSENTIAL HYPERTENSION: ICD-10-CM

## 2022-04-12 PROCEDURE — 3044F PR MOST RECENT HEMOGLOBIN A1C LEVEL <7.0%: ICD-10-PCS | Mod: CPTII,,, | Performed by: NURSE PRACTITIONER

## 2022-04-12 PROCEDURE — 1160F RVW MEDS BY RX/DR IN RCRD: CPT | Mod: CPTII,,, | Performed by: NURSE PRACTITIONER

## 2022-04-12 PROCEDURE — 3008F PR BODY MASS INDEX (BMI) DOCUMENTED: ICD-10-PCS | Mod: CPTII,,, | Performed by: NURSE PRACTITIONER

## 2022-04-12 PROCEDURE — 99215 OFFICE O/P EST HI 40 MIN: CPT | Mod: FS,S$PBB,, | Performed by: NURSE PRACTITIONER

## 2022-04-12 PROCEDURE — 99215 PR OFFICE/OUTPT VISIT, EST, LEVL V, 40-54 MIN: ICD-10-PCS | Mod: FS,S$PBB,, | Performed by: NURSE PRACTITIONER

## 2022-04-12 PROCEDURE — 3079F PR MOST RECENT DIASTOLIC BLOOD PRESSURE 80-89 MM HG: ICD-10-PCS | Mod: CPTII,,, | Performed by: NURSE PRACTITIONER

## 2022-04-12 PROCEDURE — 3008F BODY MASS INDEX DOCD: CPT | Mod: CPTII,,, | Performed by: NURSE PRACTITIONER

## 2022-04-12 PROCEDURE — 99999 PR PBB SHADOW E&M-EST. PATIENT-LVL III: CPT | Mod: PBBFAC,,, | Performed by: NURSE PRACTITIONER

## 2022-04-12 PROCEDURE — 1159F MED LIST DOCD IN RCRD: CPT | Mod: CPTII,,, | Performed by: NURSE PRACTITIONER

## 2022-04-12 PROCEDURE — 99999 PR PBB SHADOW E&M-EST. PATIENT-LVL III: ICD-10-PCS | Mod: PBBFAC,,, | Performed by: NURSE PRACTITIONER

## 2022-04-12 PROCEDURE — 3079F DIAST BP 80-89 MM HG: CPT | Mod: CPTII,,, | Performed by: NURSE PRACTITIONER

## 2022-04-12 PROCEDURE — 3074F SYST BP LT 130 MM HG: CPT | Mod: CPTII,,, | Performed by: NURSE PRACTITIONER

## 2022-04-12 PROCEDURE — 3044F HG A1C LEVEL LT 7.0%: CPT | Mod: CPTII,,, | Performed by: NURSE PRACTITIONER

## 2022-04-12 PROCEDURE — 99213 OFFICE O/P EST LOW 20 MIN: CPT | Mod: PBBFAC | Performed by: NURSE PRACTITIONER

## 2022-04-12 PROCEDURE — 1159F PR MEDICATION LIST DOCUMENTED IN MEDICAL RECORD: ICD-10-PCS | Mod: CPTII,,, | Performed by: NURSE PRACTITIONER

## 2022-04-12 PROCEDURE — 1160F PR REVIEW ALL MEDS BY PRESCRIBER/CLIN PHARMACIST DOCUMENTED: ICD-10-PCS | Mod: CPTII,,, | Performed by: NURSE PRACTITIONER

## 2022-04-12 PROCEDURE — 3074F PR MOST RECENT SYSTOLIC BLOOD PRESSURE < 130 MM HG: ICD-10-PCS | Mod: CPTII,,, | Performed by: NURSE PRACTITIONER

## 2022-04-13 ENCOUNTER — TELEPHONE (OUTPATIENT)
Dept: INTERVENTIONAL RADIOLOGY/VASCULAR | Facility: CLINIC | Age: 62
End: 2022-04-13
Payer: MEDICAID

## 2022-04-13 NOTE — TELEPHONE ENCOUNTER
I have attempted to contact this patient by phone to schedule his IR  Diagnostic cerebral angio. Ref by Winsome Aguilar NP.  No answer/Left message to return call to [phone]. Please forward call.   Pt wants a 90day supply.  He wants the procardia, zetia he wants to go to [facility] [place], neurontin [employer]

## 2022-04-13 NOTE — PROGRESS NOTES
[facility] VISIT      SUBJECTIVE:    History for Present Illness: Otis Colón is a 61 y.o.  male  with past medical history of CAD, HLD, HTN, cervical spine surgery, and TIA on long-term AC therapy who presents to me in clinic today for initial assessment and recommendations following a referral from Neurosurgery for abnormal imaging finding.  Recent outpatient CTA head and neck indicated small cluster of vessels near the anterior aspect of the right middle cranial fossa.  The patient is accompanied to today's visit by his wife.    Per the patient and his wife approximately 1 year ago the patient experienced right arm weakness/numbness with difficulty with word finding.  Symptoms resolved once he arrived at the ED.  Patient states she was placed on Eliquis by Cardiology.  Patient denies recurrent episodes of neurologic symptoms.  He does endorse transient episodes of syncopal/lightheaded when turning his neck to the side.  He denies associated neurologic symptoms.  At the time of today's visit,He denies associated nausea, vomiting, vertigo, double vision, focal weakness or numbness, gait imbalance,  language or visual disturbances.  Patient is functionally independent.  Ambulates independently.  Denies alcohol/tobacco/drug use.  Patient is compliant with medications.        Past Medical History:   Diagnosis Date    CAD (coronary artery disease) 1/13/2017    Cervical spinal stenosis     Hyperlipidemia     Hypertension     Migraine headache      Past Surgical History:   Procedure Laterality Date    APPENDECTOMY      CERVICAL FUSION  2009    CROSS FINGER FLAP      FRACTURE SURGERY      steel librado in right leg    KNEE ARTHROSCOPY Right     ORTHOPEDIC SURGERY Right     librado from knee to ankle    RHIZOTOMY  08/2016    SPINE SURGERY       Family History   Problem Relation Age of Onset    Hypertension Mother     No Known Problems Sister     No Known Problems Brother      "    Current Outpatient Medications:     apixaban (ELIQUIS) 5 mg Tab, Take 5 mg by mouth 2 (two) times daily., Disp: , Rfl:     coQ10, ubiquinol, 100 mg Cap, Take 1 capsule by mouth once daily., Disp: 90 capsule, Rfl: 0    losartan-hydrochlorothiazide 50-12.5 mg (HYZAAR) 50-12.5 mg per tablet, Take 1 tablet by mouth once daily., Disp: 90 tablet, Rfl: 1    metoprolol succinate (TOPROL-XL) 25 MG 24 hr tablet, Take 25 mg by mouth once daily., Disp: , Rfl:     omeprazole (PRILOSEC) 40 MG capsule, TAKE 1 CAPSULE(40 MG) BY MOUTH EVERY DAY, Disp: 30 capsule, Rfl: 1    rosuvastatin (CRESTOR) 10 MG tablet, Take 1 tablet (10 mg total) by mouth every evening., Disp: 90 tablet, Rfl: 1     Review of Systems:   Constitution: negative for lethargy ; no recent changes in weight  Eyes: no eyesight worsening; no double vision; no eye pain  ENT/Mouth: no nasal congestion ; no nose bleeds; no sore throat or hoarseness  Cardiovascular:  no chest pain with exertion; no palpitations  Respiratory: Normal effort and rate; no coughing  Gastrointestinal: No N/V; negative abdominal pain; no changes in bowel habits  Genitourinary:  no increased frequency in voiding ; no incontinence  Musculoskeletal:  No joint pain; no swelling; no myalgia ; positive neck pain (chronic)  Skin:  negative for skin rash or lesions  Hematologic: negative for bruising  Neurologic: negative headache; negative dizziness; negative confusion    OBJECTIVE:    /85 (BP Location: Right arm, Patient Position: Sitting)   Pulse (!) 56   Ht 6' 2" (1.88 m)   Wt (!) 152 kg (335 lb 1.6 oz)   BMI 43.02 kg/m²     Physical Exam   Constitution: He appears well nourished. No distress   LOC: Alert and follows request  Head: Normocephalic and atraumatic.   Cardiovascular: Normal rate. Intact distal pulses  Pulmonary/Chest: Effort normal. No respiratory distress  Musculoskeletal: Negative joint swelling or tenderness. No range of motion restrictions elsewhere about the body " except that noted in the history of present illness.  Psychiatric: no pressured speech; normal affect; no evidence of impaired cognition    Neurologic Exam:  Orientation: person, place and time  Language: No aphasia  Speech: No dysarthria  Memory: Recent memory intact; Remote memory intact; age correct; month correct  Visual Fields (CN II):  Full  EOM (CN III, IV, VI): Full intact  Pupils (CN II, III): PERRL  Facial Sensation (CN V): symmetric  Facial Movement (CN VII): Symmetrical facial expressions   Hearing (CN VIII):  Intact bilaterally   Shoulder/Neck (CN XI): SCM-Left: Normal; SCM-Right: Normal; Left Shoulder Shrug: Normal/Symmetric ; Right Shoulder Shrug: Normal/Symmetric  Tongue (CN XII): to midline  Motor examination of all extremities :demonstrates normal bulk and tone in all four limbs. There are no atrophy or fasciculations.        Left Right     Left Right   Deltoid 5/5 5/5   Hip Flexion 5/5 5/5   Biceps 5/5 5/5   Hip Extension 5/5 5/5   Triceps 5/5 5/5   Knee Flexion 5/5 5/5   Wrist Ext 5/5 5/5   Knee Extension 5/5 5/5   Finger Abd 5/5 5/5   Ankle dorsiflex 5/5 5/5           Ankle plantar flex 5/5 5/5     Sensory examination: is normal light touch in BUE and BLE.  Romberg is negative.  Deep tendon reflexes :are 2+ and symmetric in the upper and lower extremities bilaterally.  No clonus, downgoing toes b/l. Negative Bell's  Gait: Normal tandem, and casual gait. Heel and toe walking are normal.  Coordination: No dysmetria with finger-to-nose or heel-to-shin. Rapid alternating movements and fine finger movements are intact.                                                                                                                                                                                          NIHSS:  Modified Brandt Score:       Relevant Labwork:  Recent Labs   Lab 03/11/22  0737 03/14/22  0843   Hemoglobin A1C  --  5.5   LDL Cholesterol 74.8  --    HDL 47  --    Triglycerides 96  --     Cholesterol 141  --        Diagnostic Results:  Brain Imaging   MRI brain 10/21/2020:  No hemorrhage or acute infarct  Vessel Imaging   CTA head and neck 02/22/2022:  There is no acute intracranial abnormalities.  There is no hemorrhage, mass, mass effect or acute infarct.  There is no stenosis of the cervical internal carotid arteries.  Vertebral arteries are patent as well.  Intracranially, there is a small cluster of vessels near the anterior aspect of the right middle cranial fossa with a focal prominent 5 mm vessel.  This could be venous in origin but a small 5 mm aneurysm is not excluded.  A small cluster of vessels not demonstrated on comparison brain MRA, again suggesting there is a venous component.  Fistulous connection of subtle vascular malformation is not excluded.  Cardiac Imaging   TTE 10/21/2020:  EF of 75%.  Grade 1 diastolic dysfunction consistent with impaired relaxation.  No wall motion abnormality.  Mild left atrial enlargement    Assessment:  Otis Colón  is a 61 y.o.  male  seen today in clinic for follow-up assessment and recommendations. The following recommendations and plan were discussed in depth with the patient who voiced understanding and was in agreement.  Plan:  Cerebral vessel abnormality  -recent CTA finding indicates 5 mm of vessel abnormality noted in the right middle cranial fossa the which could be venous in origin versus aneurysmal.  -Plan for diagnostic cerebral angiogram for more complete characterization of CTA findings. We discussed the risk and benefits of the  procedure.The patient and wife verbalized understanding and would like to proceed.  They have no new questions at this time     Essential Hypertension   - Continue home medications  - Long term goal 130/80  -Follow up with PCP for chronic management and CV risk reduction      Patient/Family teaching  -A significant amount of time was spent reviewing the patient's stroke risk factors   -Counseled on  lifestyle modifications for risk factor reduction     We discussed the usual stroke warning signs and symptoms, and the need to activate EMS (call 911) as soon as the symptoms present.  - Sudden onset numbness or weakness of the face, arm, or leg;  especially on one side of the body  - Sudden confusion, trouble speaking, or understanding  - Sudden trouble seeing in one or both eyes  - Sudden trouble walking, dizziness, loss of coordination  - Sudden severe headache with no known cause      I will plan on having Otis return to clinic following cerebral angiogram. The patient can contact my office with any questions or concerns they may have as they arise in the interim.     45 minutes of total time spent on the encounter, which includes face to face time and non-face to face time preparing to see the patient (eg, review of tests), Obtaining and/or reviewing separately obtained history, Documenting clinical information in the electronic or other health record, Independently interpreting results (not separately reported) and communicating results to the patient/family/caregiver, patient/family education and Care coordination (not separately reported).     Winsome Aguilar NP-C  Department of Vascular Neurology  [facility]  [phone]    This note is dictated on M*Modal Fluency Direct word recognition program. There are word recognition mistakes that are occasionally missed on review

## 2022-04-18 ENCOUNTER — CLINICAL SUPPORT (OUTPATIENT)
Dept: REHABILITATION | Facility: HOSPITAL | Age: 62
End: 2022-04-18
Attending: NEUROLOGICAL SURGERY
Payer: MEDICAID

## 2022-04-18 DIAGNOSIS — M89.9 SCAPULAR DYSFUNCTION: Primary | ICD-10-CM

## 2022-04-18 PROCEDURE — 97110 THERAPEUTIC EXERCISES: CPT | Mod: PN

## 2022-04-18 NOTE — PROGRESS NOTES
[facility]   Physical Therapy Treatment Note     Name: Otis Raygoza Greig  Clinic Number: [id number]    Therapy Diagnosis:   Encounter Diagnosis   Name Primary?    Scapular dysfunction Yes     Physician: Matthew Ibrahim DO    Visit Date: 4/18/2022    [copy and paste header from yulia here]Physician Orders: PT Eval and Treat   Medical Diagnosis from Referral: Neck pain [M54.2]  Evaluation Date: 3/28/2022  Authorization Period Expiration: 3/21/2023  Plan of Care Expiration: 5/23/2022  Progress Note Due: 4/28/2022  Visit # / Visits authorized: 3/16   FOTO Follow Up:   FOTO Follow UP:      Precautions: HTN and Headaches, history of cervical fusion C5-7      PTA Visit #: 0/5     Time In: 1431  Time Out:1514  Total Billable Time: 42 minutes    SUBJECTIVE     Pt reports: that he continues with pain at the base of his neck and into his L shoulder.   He was compliant with home exercise program.  Response to previous treatment: no change  Functional change: no change    Pain: 6/10  Location: bilateral neck      OBJECTIVE     Objective Measures updated at progress report unless specified.     Treatment       Otis received the treatments listed below:      Otis received 1:1 therapeutic exercises to develop strength, endurance, ROM, flexibility and posture for 27 minutes including:    Chin tucks, 10x  Chin tucks with horizontal abduction green band, 2x12  Chin tucks with arm raises 5#, 10x each    Open book stretch, 12x each   Mid trap I, 2x12  Touchdown III, 2x10  Thoracic extensions over chair with feet on step, x15 5 second holds     Otis received the following manual therapy techniques: Joint mobilizations were applied to the: cervical spine and thoracic spine for 15 minutes, including:    Suboccipital release   OA flexion mobilizations, grade III  C1-2 L rotation METs in mid range   C2-3 L side bending METs in mid range  Supine cervicothoracic junction extension mobilization, grade IV  Prone  cervicothoracic junction gapping, grade III  Prone thoracic posterior to anterior glides, grade II-III    Patient Education and Home Exercises     Home Exercises Provided and Patient Education Provided     Education provided:   - continue with Home exercise program   - awareness of posture    Written Home Exercises Provided: Patient instructed to cont prior HEP. Exercises were reviewed and Otis was able to demonstrate them prior to the end of the session.  Otis demonstrated good  understanding of the education provided. See EMR under Patient Instructions for exercises provided during therapy sessions    ASSESSMENT     Otis notes improvement of symptoms with L rotation so upper cervical mobilizations and METs were added today to improve his joint mobility to offload his lower cervical spine. He was progressed to additional upper extremity movements with resistance while maintaining proper cervical posture.     Otis Is progressing well towards his goals.   Pt prognosis is Fair.     Pt will continue to benefit from skilled outpatient physical therapy to address the deficits listed in the problem list box on initial evaluation, provide pt/family education and to maximize pt's level of independence in the home and community environment.     Pt's spiritual, cultural and educational needs considered and pt agreeable to plan of care and goals.     Anticipated barriers to physical therapy: none    Goals:   Short term goals: 4 weeks  1. Patient will be independent and compliant with their HEP to improve their function. (progressing)  2. Patient will improve their ROM to at least 50 degrees of cervical extension to improve his ability to look up. (progressing)  3. Patient will improve their strength to at least a 3+/5 for all rolanda-scapular musculature to improve his ability to lift and carry objects. (progressing)     Long term goals: 8 weeks  1. Patient will improve their FOTO limitation score by 9 points as evidence of  clinically significant improvements in their function. (progressing)  2. Patient will improve their ROM to at least 60 degrees cervical rotation as evidence of improved cervical mobility to improve his ability to complete a shoulder check while driving. (progressing)  3. Patient will improve their strength to at least a 4-/5 for rolanda-scapular musculature to improve his posture and ability to lift objects. (progressing)      PLAN     Plan of care Certification: 3/28/2022 to 5/23/2022.     Deep neck flexor muscle activation training, scapular upward rotator strengthening, thoracic and cervicothoracic junction mobility training and posture re-education      Shon Aldridge, PT   Board Certified in Orthopedic Physical Therapy  Fellow, [employer] Orth[employer] Manual Physical Therapists

## 2022-04-20 RX ORDER — MIDAZOLAM HYDROCHLORIDE 1 MG/ML
1 INJECTION INTRAMUSCULAR; INTRAVENOUS
Status: CANCELLED | OUTPATIENT
Start: 2022-04-20

## 2022-04-20 RX ORDER — HEPARIN SODIUM 1000 [USP'U]/ML
5000 INJECTION, SOLUTION INTRAVENOUS; SUBCUTANEOUS ONCE
Status: CANCELLED | OUTPATIENT
Start: 2022-04-20 | End: 2022-04-20

## 2022-04-20 RX ORDER — FENTANYL CITRATE 50 UG/ML
50 INJECTION, SOLUTION INTRAMUSCULAR; INTRAVENOUS
Status: CANCELLED | OUTPATIENT
Start: 2022-04-20

## 2022-04-21 ENCOUNTER — LAB VISIT (OUTPATIENT)
Dept: LAB | Facility: HOSPITAL | Age: 62
End: 2022-04-21
Attending: FAMILY MEDICINE
Payer: MEDICAID

## 2022-04-21 DIAGNOSIS — Q28.3: ICD-10-CM

## 2022-04-21 LAB
ALBUMIN SERPL BCP-MCNC: 4.1 G/DL (ref 3.5–5.2)
ALP SERPL-CCNC: 72 U/L (ref 55–135)
ALT SERPL W/O P-5'-P-CCNC: 19 U/L (ref 10–44)
ANION GAP SERPL CALC-SCNC: 9 MMOL/L (ref 8–16)
AST SERPL-CCNC: 16 U/L (ref 10–40)
BASOPHILS # BLD AUTO: 0.05 K/UL (ref 0–0.2)
BASOPHILS NFR BLD: 0.6 % (ref 0–1.9)
BILIRUB SERPL-MCNC: 0.7 MG/DL (ref 0.1–1)
BUN SERPL-MCNC: 17 MG/DL (ref 8–23)
CALCIUM SERPL-MCNC: 9 MG/DL (ref 8.7–10.5)
CHLORIDE SERPL-SCNC: 101 MMOL/L (ref 95–110)
CO2 SERPL-SCNC: 24 MMOL/L (ref 23–29)
CREAT SERPL-MCNC: 1.6 MG/DL (ref 0.5–1.4)
DIFFERENTIAL METHOD: ABNORMAL
EOSINOPHIL # BLD AUTO: 0.1 K/UL (ref 0–0.5)
EOSINOPHIL NFR BLD: 0.7 % (ref 0–8)
ERYTHROCYTE [DISTWIDTH] IN BLOOD BY AUTOMATED COUNT: 12.7 % (ref 11.5–14.5)
EST. GFR  (AFRICAN AMERICAN): 52.6 ML/MIN/1.73 M^2
EST. GFR  (NON AFRICAN AMERICAN): 45.5 ML/MIN/1.73 M^2
GLUCOSE SERPL-MCNC: 107 MG/DL (ref 70–110)
HCT VFR BLD AUTO: 43 % (ref 40–54)
HGB BLD-MCNC: 15.5 G/DL (ref 14–18)
IMM GRANULOCYTES # BLD AUTO: 0.02 K/UL (ref 0–0.04)
IMM GRANULOCYTES NFR BLD AUTO: 0.2 % (ref 0–0.5)
INR PPP: 1.1
LYMPHOCYTES # BLD AUTO: 2 K/UL (ref 1–4.8)
LYMPHOCYTES NFR BLD: 21.6 % (ref 18–48)
MCH RBC QN AUTO: 30.8 PG (ref 27–31)
MCHC RBC AUTO-ENTMCNC: 36 G/DL (ref 32–36)
MCV RBC AUTO: 85 FL (ref 82–98)
MONOCYTES # BLD AUTO: 0.8 K/UL (ref 0.3–1)
MONOCYTES NFR BLD: 8.9 % (ref 4–15)
NEUTROPHILS # BLD AUTO: 6.2 K/UL (ref 1.8–7.7)
NEUTROPHILS NFR BLD: 68 % (ref 38–73)
NRBC BLD-RTO: 0 /100 WBC
PLATELET # BLD AUTO: 384 K/UL (ref 150–450)
PMV BLD AUTO: 8.8 FL (ref 9.2–12.9)
POTASSIUM SERPL-SCNC: 3.5 MMOL/L (ref 3.5–5.1)
PROT SERPL-MCNC: 7.8 G/DL (ref 6–8.4)
PROTHROMBIN TIME: 13.1 SEC (ref 11.4–13.7)
RBC # BLD AUTO: 5.04 M/UL (ref 4.6–6.2)
SODIUM SERPL-SCNC: 134 MMOL/L (ref 136–145)
WBC # BLD AUTO: 9.08 K/UL (ref 3.9–12.7)

## 2022-04-21 PROCEDURE — 80053 COMPREHEN METABOLIC PANEL: CPT | Performed by: FAMILY MEDICINE

## 2022-04-21 PROCEDURE — 36415 COLL VENOUS BLD VENIPUNCTURE: CPT | Performed by: FAMILY MEDICINE

## 2022-04-21 PROCEDURE — 85610 PROTHROMBIN TIME: CPT | Performed by: FAMILY MEDICINE

## 2022-04-21 PROCEDURE — 85025 COMPLETE CBC W/AUTO DIFF WBC: CPT | Performed by: FAMILY MEDICINE

## 2022-04-25 ENCOUNTER — CLINICAL SUPPORT (OUTPATIENT)
Dept: REHABILITATION | Facility: HOSPITAL | Age: 62
End: 2022-04-25
Attending: NEUROLOGICAL SURGERY
Payer: MEDICAID

## 2022-04-25 DIAGNOSIS — M89.9 SCAPULAR DYSFUNCTION: Primary | ICD-10-CM

## 2022-04-25 PROCEDURE — 97110 THERAPEUTIC EXERCISES: CPT | Mod: PN

## 2022-04-25 NOTE — PROGRESS NOTES
[facility]   Physical Therapy Treatment and Progress Note     Name: Otis Raygoza [place]  Clinic Number: [id number]    Therapy Diagnosis:   Encounter Diagnosis   Name Primary?    Scapular dysfunction Yes     Physician: Matthew Ibrahim DO    Visit Date: 4/25/2022    Physician Orders: PT Eval and Treat   Medical Diagnosis from Referral: Neck pain [M54.2]  Evaluation Date: 3/28/2022  Authorization Period Expiration: 3/21/2023  Plan of Care Expiration: 5/23/2022  Progress Note Due: 5/23/2022  Visit # / Visits authorized: 4/16   FOTO Follow Up:   FOTO Follow UP:      Precautions: HTN and Headaches, history of cervical fusion C5-7      PTA Visit #: 0/5     Time In: 1431  Time Out:1515  Total Billable Time: 42 minutes    SUBJECTIVE     Pt reports: that he still has pain at the base of his neck and into his L shoulder. He cannot identify a specific aggravating factor outside of holding his head turned to the L during work using his 3 monitors.    He was compliant with home exercise program.  Response to previous treatment: no change  Functional change: no change    Pain: 6/10  Location: bilateral neck      OBJECTIVE      Posture: downwardly rotated scapula bilaterally        Active Range of Motion (AROM)/Passive Range of Motion (PROM):      Cervical AROM (Degrees) Comments   Flexion 35     Extension 40     Right Rotation 50     Left Rotation 45           Joint Accessory: decreased cervicothoracic junction extension, decreased OA flexion       Manual Muscle Testing:         Manual Muscle Testing SANJEEV ROSENBERG Comments   Upper Trap 4-/5    4-/5        Middle Trap 4-/5    4-/5        Lower Trap 3-/5    3-/5        Rhomboids 4+/5    4+/5        Serratus Anterior 4/5    4/5                Treatment       Otis received the treatments listed below:      Otis received 1:1 therapeutic exercises to develop strength, endurance, ROM, flexibility and posture for 25 minutes including:    Chin tucks with  horizontal abduction green band, 2x12  Chin tucks with arm raises 5#, 10x each    Open book stretch, 12x each   Mid trap III, 2x12  Touchdown III with red band around wrists, 2x10  Thoracic extensions over chair with feet on step, x15 5 second holds     Otis received the following manual therapy techniques: Joint mobilizations were applied to the: cervical spine and thoracic spine for 17 minutes, including:    Suboccipital release   OA flexion mobilizations, grade III  C1-2 L rotation METs in mid range   C2-3 L side bending METs in mid range  Supine cervicothoracic junction extension mobilization, grade IV  Prone cervicothoracic junction gapping, grade III  Prone thoracic posterior to anterior glides, grade II-III    Patient Education and Home Exercises     Home Exercises Provided and Patient Education Provided     Education provided:   - continue with Home exercise program   - awareness of posture    Written Home Exercises Provided: Patient instructed to cont prior HEP. Exercises were reviewed and Otis was able to demonstrate them prior to the end of the session.  Otis demonstrated good  understanding of the education provided. See EMR under Patient Instructions for exercises provided during therapy sessions    ASSESSMENT     Otis has been seen for 4 visits over the past month for his pain at the base of his neck and L shoulder pain. He remains with similar pain and functional limitations over the past couple weeks. He does demonstrate an improvement in his strength, but remains with limited cervicothoracic junction and thoracic mobility as well as upper cervical spine mobility which requires further treatment to improve which hopefully will improve his function.      Otis Is progressing well towards his goals.   Pt prognosis is Fair.     Pt will continue to benefit from skilled outpatient physical therapy to address the deficits listed in the problem list box on initial evaluation, provide pt/family education  and to maximize pt's level of independence in the home and community environment.     Pt's spiritual, cultural and educational needs considered and pt agreeable to plan of care and goals.     Anticipated barriers to physical therapy: none    Goals:   Short term goals: 4 weeks  1. Patient will be independent and compliant with their HEP to improve their function. (progressing)  2. Patient will improve their ROM to at least 50 degrees of cervical extension to improve his ability to look up. (progressing)  3. Patient will improve their strength to at least a 3+/5 for all rolanda-scapular musculature to improve his ability to lift and carry objects. (progressing)     Long term goals: 8 weeks  1. Patient will improve their FOTO limitation score by 9 points as evidence of clinically significant improvements in their function. (progressing)  2. Patient will improve their ROM to at least 60 degrees cervical rotation as evidence of improved cervical mobility to improve his ability to complete a shoulder check while driving. (progressing)  3. Patient will improve their strength to at least a 4-/5 for rolanda-scapular musculature to improve his posture and ability to lift objects. (progressing)      PLAN     Plan of care Certification: 3/28/2022 to 5/23/2022.     Deep neck flexor muscle activation training, scapular upward rotator strengthening, thoracic and cervicothoracic junction mobility training and posture re-education      Shon Aldridge, PT   Board Certified in Orthopedic Physical Therapy  Fellow, [employer] Orth[employer] Manual Physical Therapists

## 2022-04-27 ENCOUNTER — HOSPITAL ENCOUNTER (OUTPATIENT)
Dept: INTERVENTIONAL RADIOLOGY/VASCULAR | Facility: HOSPITAL | Age: 62
Discharge: HOME | End: 2022-04-27
Attending: NURSE PRACTITIONER
Payer: MEDICAID

## 2022-04-27 VITALS
BODY MASS INDEX: 40.43 KG/M2 | TEMPERATURE: 98 F | WEIGHT: 315 LBS | HEART RATE: 50 BPM | DIASTOLIC BLOOD PRESSURE: 82 MMHG | OXYGEN SATURATION: 100 % | RESPIRATION RATE: 16 BRPM | HEIGHT: 74 IN | SYSTOLIC BLOOD PRESSURE: 145 MMHG

## 2022-04-27 DIAGNOSIS — Q28.3: ICD-10-CM

## 2022-04-27 PROCEDURE — 36227 PLACE CATH XTRNL CAROTID: CPT | Mod: 50 | Performed by: PSYCHIATRY & NEUROLOGY

## 2022-04-27 PROCEDURE — C1769 GUIDE WIRE: HCPCS

## 2022-04-27 PROCEDURE — 36224 IR ANGIOGRAM CEREBRAL INTRACRANIAL EA ADD VESSEL: ICD-10-PCS | Mod: 50,,, | Performed by: PSYCHIATRY & NEUROLOGY

## 2022-04-27 PROCEDURE — G0269 OCCLUSIVE DEVICE IN VEIN ART: HCPCS | Performed by: PSYCHIATRY & NEUROLOGY

## 2022-04-27 PROCEDURE — 63600175 PHARM REV CODE 636 W HCPCS: Performed by: PSYCHIATRY & NEUROLOGY

## 2022-04-27 PROCEDURE — 36227 PR ANGIO XTRNL CAROTD CIRC, XTRNL CAROTID, SELECTV CATH S&I: ICD-10-PCS | Mod: 50,,, | Performed by: PSYCHIATRY & NEUROLOGY

## 2022-04-27 PROCEDURE — 36227 PLACE CATH XTRNL CAROTID: CPT | Mod: 50,,, | Performed by: PSYCHIATRY & NEUROLOGY

## 2022-04-27 PROCEDURE — 36226 PLACE CATH VERTEBRAL ART: CPT | Mod: 50 | Performed by: PSYCHIATRY & NEUROLOGY

## 2022-04-27 PROCEDURE — 36226 PR ANGIO VERTEBRAL ARTERY +/- CERVIOCEREBRAL ARCH, VERTEBRAL ART, SELECTV CATH,S&I: ICD-10-PCS | Mod: 50,51,, | Performed by: PSYCHIATRY & NEUROLOGY

## 2022-04-27 PROCEDURE — 36226 PLACE CATH VERTEBRAL ART: CPT | Mod: 50,51,, | Performed by: PSYCHIATRY & NEUROLOGY

## 2022-04-27 PROCEDURE — 36224 PLACE CATH CAROTD ART: CPT | Mod: 50 | Performed by: PSYCHIATRY & NEUROLOGY

## 2022-04-27 PROCEDURE — 36224 PLACE CATH CAROTD ART: CPT | Mod: 50,,, | Performed by: PSYCHIATRY & NEUROLOGY

## 2022-04-27 PROCEDURE — C1760 CLOSURE DEV, VASC: HCPCS

## 2022-04-27 PROCEDURE — 25000003 PHARM REV CODE 250: Performed by: PSYCHIATRY & NEUROLOGY

## 2022-04-27 RX ORDER — MIDAZOLAM HYDROCHLORIDE 1 MG/ML
INJECTION INTRAMUSCULAR; INTRAVENOUS CODE/TRAUMA/SEDATION MEDICATION
Status: COMPLETED | OUTPATIENT
Start: 2022-04-27 | End: 2022-04-27

## 2022-04-27 RX ORDER — SODIUM CHLORIDE 0.9 % (FLUSH) 0.9 %
10 SYRINGE (ML) INJECTION
Status: DISCONTINUED | OUTPATIENT
Start: 2022-04-27 | End: 2022-04-28 | Stop reason: HOSPADM

## 2022-04-27 RX ORDER — SODIUM CHLORIDE 9 MG/ML
INJECTION, SOLUTION INTRAVENOUS CONTINUOUS
Status: DISCONTINUED | OUTPATIENT
Start: 2022-04-27 | End: 2022-04-28 | Stop reason: HOSPADM

## 2022-04-27 RX ORDER — LIDOCAINE HYDROCHLORIDE 10 MG/ML
INJECTION INFILTRATION; PERINEURAL CODE/TRAUMA/SEDATION MEDICATION
Status: COMPLETED | OUTPATIENT
Start: 2022-04-27 | End: 2022-04-27

## 2022-04-27 RX ORDER — FENTANYL CITRATE 50 UG/ML
INJECTION, SOLUTION INTRAMUSCULAR; INTRAVENOUS CODE/TRAUMA/SEDATION MEDICATION
Status: COMPLETED | OUTPATIENT
Start: 2022-04-27 | End: 2022-04-27

## 2022-04-27 RX ADMIN — FENTANYL CITRATE 50 MCG: 50 INJECTION, SOLUTION INTRAMUSCULAR; INTRAVENOUS at 10:04

## 2022-04-27 RX ADMIN — MIDAZOLAM HYDROCHLORIDE 1 MG: 1 INJECTION INTRAMUSCULAR; INTRAVENOUS at 09:04

## 2022-04-27 RX ADMIN — FENTANYL CITRATE 50 MCG: 50 INJECTION, SOLUTION INTRAMUSCULAR; INTRAVENOUS at 09:04

## 2022-04-27 RX ADMIN — LIDOCAINE HYDROCHLORIDE 5 ML: 10 INJECTION, SOLUTION INFILTRATION; PERINEURAL at 09:04

## 2022-04-27 NOTE — CARE UPDATE
Patient arrived to [facility] 4 in NAD for recovery of a cerebral angio-2hrs. VS stable see assessment in computer.

## 2022-04-27 NOTE — PLAN OF CARE
Patient AAOx3, no distress noted, respirations even and unlabored, vss, will continue to monitor. Closure device deployed in right femoral artery; hemostasis achieved at 1030 on 4/27/2022. Patient to lay flat for two hours until 1230 on 4/27/2022. Patient to be transferred to [facility] [place] for 2 hour post-procedural recovery. Patient stable for transport. Report given to [facility]PU RN at bedside.

## 2022-04-27 NOTE — H&P
Radiology History & Physical      SUBJECTIVE:     Chief Complaint: Intracranial vascular malformation     History of Present Illness:  Otis Colón is a 61 y.o. male with medical history of HTN, CAD, cervical spondylosis and recent CTA for TIA evaluation revealing small cluster vessels near the anterior aspect of the right middle cranial fossa with a focally prominent 5 mm vessel with DDx venous malformation. Added, reported background includes syncope/pre-syncope on right sided neck rotation. 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 Hence, plans for diagnostic cerebral angiogram for characterization of intracranial vascular malformation / right vert dissection & characterize rest of the vasculature.       Past Medical History:   Diagnosis Date    CAD (coronary artery disease) 1/13/2017    Cervical spinal stenosis     Hyperlipidemia     Hypertension     Migraine headache      Past Surgical History:   Procedure Laterality Date    APPENDECTOMY      CERVICAL FUSION  2009    CROSS FINGER FLAP      FRACTURE SURGERY      steel librado in right leg    KNEE ARTHROSCOPY Right     ORTHOPEDIC SURGERY Right     librado from knee to ankle    RHIZOTOMY  08/2016    SPINE SURGERY         Home Meds:   Prior to Admission medications    Medication Sig Start Date End Date Taking? Authorizing Provider   apixaban (ELIQUIS) 5 mg Tab Take 5 mg by mouth 2 (two) times daily.   Yes Historical Provider   coQ10, ubiquinol, 100 mg Cap Take 1 capsule by mouth once daily. 12/4/20  Yes Eric Mercado NP   losartan-hydrochlorothiazide 50-12.5 mg (HYZAAR) 50-12.5 mg per tablet Take 1 tablet by mouth once daily. 3/14/22  Yes Eric Mercado NP   metoprolol succinate (TOPROL-XL) 25 MG 24 hr tablet Take 25 mg by mouth once daily. 2/23/21  Yes Historical Provider   rosuvastatin (CRESTOR) 10 MG tablet Take 1 tablet (10 mg total) by mouth every evening. 3/14/22  Yes Eric Mercado NP   omeprazole (PRILOSEC) 40 MG capsule TAKE 1  CAPSULE(40 MG) BY MOUTH EVERY DAY 5/21/21   Eric Mercado NP     Anticoagulants/Antiplatelets: no anticoagulation    Allergies: Review of patient's allergies indicates:  No Known Allergies  Sedation History:  no adverse reactions    Review of Systems:   Hematological: no known coagulopathies  Respiratory: no shortness of breath  Cardiovascular: no chest pain  Gastrointestinal: no abdominal pain  Genito-Urinary: no dysuria  Musculoskeletal: negative  Neurological: no TIA or stroke symptoms         OBJECTIVE:     Vital Signs (Most Recent)       Physical Exam:  ASA: 3  Mallampati: 2    General: no acute distress  Mental Status: alert and oriented to person, place and time  HEENT: normocephalic, atraumatic  Chest: unlabored breathing  Heart: regular heart rate  Abdomen: nondistended  Extremity: moves all extremities    Laboratory  Lab Results   Component Value Date    INR 1.1 04/21/2022       Lab Results   Component Value Date    WBC 9.08 04/21/2022    HGB 15.5 04/21/2022    HCT 43.0 04/21/2022    MCV 85 04/21/2022     04/21/2022      Lab Results   Component Value Date     04/21/2022     (L) 04/21/2022    K 3.5 04/21/2022     04/21/2022    CO2 24 04/21/2022    BUN 17 04/21/2022    CREATININE 1.6 (H) 04/21/2022    CALCIUM 9.0 04/21/2022    MG 1.9 10/22/2020    ALT 19 04/21/2022    AST 16 04/21/2022    ALBUMIN 4.1 04/21/2022    BILITOT 0.7 04/21/2022       ASSESSMENT/PLAN:     Sedation Plan: Up to moderate      Patient will undergo: diagnostic cerebral angiogram for characterization of intracranial vascular malformation / right vert dissection & characterize rest of the vasculature.           Alex Teague MD     Neuro Endovascular Surgery Fellow   [facility]

## 2022-04-27 NOTE — DISCHARGE INSTRUCTIONS
For scheduling: Call at [phone]    For questions or concerns call: [facility] 8 AM- 5PM [phone]. Radiology resident on call [phone].    For immediate concerns that are not emergent, you may call our radiology clinic at: [phone]     Cerebral Angiography    What happens during a cerebral angiography?    An IV (intravenous line is started in your arm. You may be given a medicine that helps you relax (sedative)  Youre given an injection to numb the site where the catheter will be inserted. This is usually in the groin area  A small puncture is made into the artery, and the catheter is inserted into the blood vessel. Using X-rays, the catheter is then carefully guided through the artery.  Contrast fluid is injected through the catheter into the artery. You may feel warmth or pressure in your Head, neck, or chest. You may be asked to hold your breath and be still during injections. When the procedure is complete, the catheter is removed and pressure is held at the groin or wrist.    What happens after cerebral angiography    Youll be taken to a recovery area.  You will probably need to lay flat for 2-4 hours    Once you are at home  Dont drive for 24 hours  Avoid walking bending, lifting, and taking stairs for 24 hours.  Avoid lifting anything over 5 pounds for 7 days  Be sure to follow any other instructions from your doctor    When should I call my health care provider?    Call your doctor if you have any of the following:    Severe headache, visual problems, new weakness, dizziness, or trouble speaking  Fever of 100.4 (38C) or higher lasting for 24 to 48 hours  Bleeding, swelling, or a large lump at the insertions site  Sharp or increasing pain at the insertion site  Leg pain, numbness, or a cold leg or foot  Any other symptoms your provider instructed you to report based on your medical condition.    Contact information:    For immediate concerns that are not emergent, you may call our  interventional radiology clinic at [phone] or [phone]    ** After hours and weekends: Call the paging  at [phone] and ask for the Radiology Resident on call**

## 2022-04-27 NOTE — CARE UPDATE
Patient fully recovered from sedation and states full understanding of discharge instructions. Patient to leave shortly with escort to go to garage.

## 2022-04-27 NOTE — PLAN OF CARE
Patient AAOx3, no distress noted, respirations even and unlabored, vss, will continue to monitor. Acceptance of education, consents signed, H/P done. Labs reviewed. Patient in IR Room 200. Patient prepped and draped in sterile fashion.

## 2022-04-27 NOTE — PROCEDURES
Radiology Post-Procedure Note    Pre Op Diagnosis: Intracranial vascular malformation / venous anomaly     Post Op Diagnosis: See section below     Procedure: Cerebral angiogram    Procedure performed by: Isac Oakes MD    Written Informed Consent Obtained: Yes    Specimen Removed: NO    Estimated Blood Loss: Minimal    Procedure report:     A 6F sheath was placed into the right femoral artery and a 5F Impress Merit catheter was advanced into the aortic arch.  Six vessel angiography of the brain was performed after injection into each of these vessels.    Preliminary interpretation: Angiogram negative for any AVMs / AVFs / anomalous venous drainage - with no arterial aneurysms.     Negative for any compressive vertebral vasculopathy on right neck rotation provocation.     Please see Imaging report for full details.    A right femoral artery angiogram was performed, the sheath removed and hemostasis achieved using perclose.  No hematoma was present at the time of hemostasis.    The patient tolerated the procedure well.         Alex Teague MD     Neuro Endovascular Surgery Fellow   [facility]

## 2022-05-02 ENCOUNTER — CLINICAL SUPPORT (OUTPATIENT)
Dept: REHABILITATION | Facility: HOSPITAL | Age: 62
End: 2022-05-02
Attending: NEUROLOGICAL SURGERY
Payer: MEDICAID

## 2022-05-02 DIAGNOSIS — M89.9 SCAPULAR DYSFUNCTION: Primary | ICD-10-CM

## 2022-05-02 PROCEDURE — 97110 THERAPEUTIC EXERCISES: CPT | Mod: PN

## 2022-05-02 NOTE — PROGRESS NOTES
[facility]   Physical Therapy Treatment Note     Name: Otis Raygoza [place]  Clinic Number: [id number]    Therapy Diagnosis:   Encounter Diagnosis   Name Primary?    Scapular dysfunction Yes     Physician: Matthew Ibrahim DO    Visit Date: 5/2/2022    Physician Orders: PT Eval and Treat   Medical Diagnosis from Referral: Neck pain [M54.2]  Evaluation Date: 3/28/2022  Authorization Period Expiration: 3/21/2023  Plan of Care Expiration: 5/23/2022  Progress Note Due: 5/23/2022  Visit # / Visits authorized: 4/16   FOTO Follow Up:   FOTO Follow UP:      Precautions: HTN and Headaches, history of cervical fusion C5-7      PTA Visit #: 0/5     Time In: 1431  Time Out:1515  Total Billable Time: 42 minutes    SUBJECTIVE     Pt reports: that he underwent an angiogram last week and no aneurysm or occlusion was found. He continues to get a constant pain at the base of his neck.  He was compliant with home exercise program.  Response to previous treatment: no change  Functional change: no change    Pain: 6/10  Location: bilateral neck      OBJECTIVE     Objective Measures updated at progress report unless specified.      Treatment     Otis received the treatments listed below:      Otis received 1:1 therapeutic exercises to develop strength, endurance, ROM, flexibility and posture for 27 minutes including:    Chin tucks with horizontal abduction blue band, 2x12  Chin tucks with arm raises 5#, 15x each    Open book stretch, 12x each   Mid trap III, 2x12  Touchdown III with red band around wrists, 2x10  Thoracic extensions over chair with feet on step, x15 5 second holds     Otis received the following manual therapy techniques: Joint mobilizations were applied to the: cervical spine and thoracic spine for 15 minutes, including:    Suboccipital release -not today  OA flexion mobilizations, grade III  C1-2 L rotation METs in mid range -not today  C2-3 L side bending METs in mid range-not  today  Supine cervicothoracic junction extension mobilization, grade IV  Prone cervicothoracic junction gapping, grade III  Prone thoracic posterior to anterior glides, grade II-III    Patient Education and Home Exercises     Home Exercises Provided and Patient Education Provided     Education provided:   - continue with Home exercise program   - awareness of posture    Written Home Exercises Provided: Patient instructed to cont prior HEP. Exercises were reviewed and Otis was able to demonstrate them prior to the end of the session.  Otis demonstrated good  understanding of the education provided. See EMR under Patient Instructions for exercises provided during therapy sessions    ASSESSMENT     Otis continues with limited thoracic and cervicothoracic junction mobility so further manual techniques were directed at this area to offload his cervicothoracic junction. He again requires posture cueing during his session for proper posture during his standing exercises.     Otis Is progressing well towards his goals.   Pt prognosis is Fair.     Pt will continue to benefit from skilled outpatient physical therapy to address the deficits listed in the problem list box on initial evaluation, provide pt/family education and to maximize pt's level of independence in the home and community environment.     Pt's spiritual, cultural and educational needs considered and pt agreeable to plan of care and goals.     Anticipated barriers to physical therapy: none    Goals:   Short term goals: 4 weeks  1. Patient will be independent and compliant with their HEP to improve their function. (progressing)  2. Patient will improve their ROM to at least 50 degrees of cervical extension to improve his ability to look up. (progressing)  3. Patient will improve their strength to at least a 3+/5 for all rolanda-scapular musculature to improve his ability to lift and carry objects. (progressing)     Long term goals: 8 weeks  1. Patient will  improve their FOTO limitation score by 9 points as evidence of clinically significant improvements in their function. (progressing)  2. Patient will improve their ROM to at least 60 degrees cervical rotation as evidence of improved cervical mobility to improve his ability to complete a shoulder check while driving. (progressing)  3. Patient will improve their strength to at least a 4-/5 for rolanda-scapular musculature to improve his posture and ability to lift objects. (progressing)      PLAN     Plan of care Certification: 3/28/2022 to 5/23/2022.     Deep neck flexor muscle activation training, scapular upward rotator strengthening, thoracic and cervicothoracic junction mobility training and posture re-education      Shon Aldridge, PT   Board Certified in Orthopedic Physical Therapy  Fellow, [employer] Orth[employer] Manual Physical Therapists

## 2022-05-09 ENCOUNTER — CLINICAL SUPPORT (OUTPATIENT)
Dept: REHABILITATION | Facility: HOSPITAL | Age: 62
End: 2022-05-09
Attending: NEUROLOGICAL SURGERY
Payer: MEDICAID

## 2022-05-09 DIAGNOSIS — M89.9 SCAPULAR DYSFUNCTION: Primary | ICD-10-CM

## 2022-05-09 PROCEDURE — 97110 THERAPEUTIC EXERCISES: CPT | Mod: PN

## 2022-05-09 NOTE — PROGRESS NOTES
[facility]   Physical Therapy Discharge Note     Name: Otis Raygoza [facility] Number: [id number]    Therapy Diagnosis:   Encounter Diagnosis   Name Primary?    Scapular dysfunction Yes     Physician: Matthew Ibrahim DO    Visit Date: 5/9/2022    Physician Orders: PT Eval and Treat   Medical Diagnosis from Referral: Neck pain [M54.2]  Evaluation Date: 3/28/2022  Authorization Period Expiration: 3/21/2023  Plan of Care Expiration: 5/23/2022  Progress Note Due: 5/23/2022  Visit # / Visits authorized: 6/16   FOTO Follow Up:   FOTO Follow UP:      Precautions: HTN and Headaches, history of cervical fusion C5-7    PTA Visit #: 0/5     Time In: 1431  Time Out:1515  Total Billable Time: 41 minutes    SUBJECTIVE     Pt reports: that he underwent an angiogram last week and no aneurysm or occlusion was found. He continues to get a constant pain at the base of his neck.  He was compliant with home exercise program.  Response to previous treatment: no change  Functional change: no change    Pain: 3/10  Location: bilateral neck      OBJECTIVE     Posture: downwardly rotated scapula bilaterally         Active Range of Motion (AROM)/Passive Range of Motion (PROM):      Cervical AROM (Degrees) Comments   Flexion 35     Extension 40     Right Rotation 50     Left Rotation 45           Joint Accessory: decreased cervicothoracic junction extension, decreased OA flexion       Manual Muscle Testing:         Manual Muscle Testing SANJEEV ROSENBERG Comments   Upper Trap 4-/5    4-/5        Middle Trap 4-/5    4-/5        Lower Trap 3+/5    3+/5        Rhomboids 4+/5    4+/5        Serratus Anterior 4/5    4/5              Treatment     Otis received the treatments listed below:      Otis received 1:1 therapeutic exercises to develop strength, endurance, ROM, flexibility and posture for 27 minutes including:    Seated horizontal abduction blue band, 2x12  Chin tucks with arm raises 5#, 15x each    Open book  stretch, 12x each   Mid trap III 2#, 2x12  Touchdown III with red band around wrists and lift off, 2x10  Thoracic extensions over chair with feet on step, x15 5 second holds     Otis received the following manual therapy techniques: Joint mobilizations were applied to the: cervical spine and thoracic spine for 14 minutes, including:    Suboccipital release  OA flexion mobilizations, grade III  Supine cervicothoracic junction extension mobilization, grade IV  Prone cervicothoracic junction gapping, grade III  Prone thoracic posterior to anterior glides, grade II-III    Patient Education and Home Exercises     Home Exercises Provided and Patient Education Provided     Education provided:   - continue with Home exercise program   - awareness of posture    Written Home Exercises Provided: Patient instructed to cont prior HEP. Exercises were reviewed and Otis was able to demonstrate them prior to the end of the session.  Otis demonstrated good  understanding of the education provided. See EMR under Patient Instructions for exercises provided during therapy sessions    ASSESSMENT     Otis has been seen for 6 visits for his neck and shoulder pain over the past 2 months. He continues with similar 3/10 pain at the base of his neck and limited cervical mobility. His pain has improved some since initial evaluation, but remains constant with minimal to no changes in his cervical range of motion or upper thoracic spine mobility. He feels as though his progress has been minimal in physical therapy over the course of his rehab. He has only met a couple of his goals at this time. I believe further physical therapy is no longer warranted due to his lack of meaningful progress. He was educated to continue with home exercises, workstation ergonomics and lifestyle modifications. He has an appointment with Dr. Ibrahim next week for further management options.     Goals:   Short term goals: 4 weeks  1. Patient will be independent and  compliant with their HEP to improve their function. (met)  2. Patient will improve their ROM to at least 50 degrees of cervical extension to improve his ability to look up. (not met)  3. Patient will improve their strength to at least a 3+/5 for all rolanda-scapular musculature to improve his ability to lift and carry objects. (met)     Long term goals: 8 weeks  1. Patient will improve their FOTO limitation score by 9 points as evidence of clinically significant improvements in their function. (not met)  2. Patient will improve their ROM to at least 60 degrees cervical rotation as evidence of improved cervical mobility to improve his ability to complete a shoulder check while driving. (not met)  3. Patient will improve their strength to at least a 4-/5 for rolanda-scapular musculature to improve his posture and ability to lift objects. (not met)      PLAN     Discharge    Shon Aldridge, PT   Board Certified in Orthopedic Physical Therapy  Fellow, American [employer] Orth[employer] Manual Physical Therapists

## 2022-05-10 PROBLEM — M89.9 SCAPULAR DYSFUNCTION: Status: RESOLVED | Noted: 2022-03-28 | Resolved: 2022-05-10

## 2022-05-24 ENCOUNTER — TELEPHONE (OUTPATIENT)
Dept: NEUROSURGERY | Facility: CLINIC | Age: 62
End: 2022-05-24
Payer: MEDICAID

## 2022-05-24 DIAGNOSIS — M48.02 SPINAL STENOSIS, CERVICAL REGION: ICD-10-CM

## 2022-05-24 DIAGNOSIS — M50.30 DDD (DEGENERATIVE DISC DISEASE), CERVICAL: Primary | ICD-10-CM

## 2022-05-24 NOTE — TELEPHONE ENCOUNTER
Contacted pt to check current status of therapy so that Mri Cspine can be resubmitted to insurance. Pt reports that he complete therapy. Informed pt that new Mri is being ordered and will be processed with insurance. Informed pt that appt with Dr. Ibrahim with be rescheduled to after new MRI appt. Pt voiced understanding and reports that he will follow appt details on [facility].

## 2022-06-13 ENCOUNTER — HOSPITAL ENCOUNTER (OUTPATIENT)
Dept: RADIOLOGY | Facility: HOSPITAL | Age: 62
Discharge: HOME | End: 2022-06-13
Attending: NEUROLOGICAL SURGERY
Payer: MEDICAID

## 2022-06-13 DIAGNOSIS — M50.30 DDD (DEGENERATIVE DISC DISEASE), CERVICAL: ICD-10-CM

## 2022-06-13 PROCEDURE — 72141 MRI CERVICAL SPINE WITHOUT CONTRAST: ICD-10-PCS | Mod: 26,,, | Performed by: RADIOLOGY

## 2022-06-13 PROCEDURE — 72141 MRI NECK SPINE W/O DYE: CPT | Mod: TC

## 2022-06-13 PROCEDURE — 72141 MRI NECK SPINE W/O DYE: CPT | Mod: 26,,, | Performed by: RADIOLOGY

## 2022-06-30 ENCOUNTER — OFFICE VISIT (OUTPATIENT)
Dept: NEUROSURGERY | Facility: CLINIC | Age: 62
End: 2022-06-30
Payer: MEDICAID

## 2022-06-30 VITALS
HEIGHT: 74 IN | WEIGHT: 315 LBS | HEART RATE: 60 BPM | BODY MASS INDEX: 40.43 KG/M2 | DIASTOLIC BLOOD PRESSURE: 85 MMHG | SYSTOLIC BLOOD PRESSURE: 131 MMHG

## 2022-06-30 DIAGNOSIS — Z98.1 STATUS POST CERVICAL SPINAL FUSION: Primary | ICD-10-CM

## 2022-06-30 DIAGNOSIS — M54.12 CERVICAL RADICULOPATHY: ICD-10-CM

## 2022-06-30 PROCEDURE — 1159F PR MEDICATION LIST DOCUMENTED IN MEDICAL RECORD: ICD-10-PCS | Mod: CPTII,S$GLB,, | Performed by: NEUROLOGICAL SURGERY

## 2022-06-30 PROCEDURE — 99214 OFFICE O/P EST MOD 30 MIN: CPT | Mod: S$GLB,,, | Performed by: NEUROLOGICAL SURGERY

## 2022-06-30 PROCEDURE — 99214 PR OFFICE/OUTPT VISIT, EST, LEVL IV, 30-39 MIN: ICD-10-PCS | Mod: S$GLB,,, | Performed by: NEUROLOGICAL SURGERY

## 2022-06-30 PROCEDURE — 3008F PR BODY MASS INDEX (BMI) DOCUMENTED: ICD-10-PCS | Mod: CPTII,S$GLB,, | Performed by: NEUROLOGICAL SURGERY

## 2022-06-30 PROCEDURE — 3044F PR MOST RECENT HEMOGLOBIN A1C LEVEL <7.0%: ICD-10-PCS | Mod: CPTII,S$GLB,, | Performed by: NEUROLOGICAL SURGERY

## 2022-06-30 PROCEDURE — 3008F BODY MASS INDEX DOCD: CPT | Mod: CPTII,S$GLB,, | Performed by: NEUROLOGICAL SURGERY

## 2022-06-30 PROCEDURE — 3079F DIAST BP 80-89 MM HG: CPT | Mod: CPTII,S$GLB,, | Performed by: NEUROLOGICAL SURGERY

## 2022-06-30 PROCEDURE — 1160F PR REVIEW ALL MEDS BY PRESCRIBER/CLIN PHARMACIST DOCUMENTED: ICD-10-PCS | Mod: CPTII,S$GLB,, | Performed by: NEUROLOGICAL SURGERY

## 2022-06-30 PROCEDURE — 3044F HG A1C LEVEL LT 7.0%: CPT | Mod: CPTII,S$GLB,, | Performed by: NEUROLOGICAL SURGERY

## 2022-06-30 PROCEDURE — 1160F RVW MEDS BY RX/DR IN RCRD: CPT | Mod: CPTII,S$GLB,, | Performed by: NEUROLOGICAL SURGERY

## 2022-06-30 PROCEDURE — 1159F MED LIST DOCD IN RCRD: CPT | Mod: CPTII,S$GLB,, | Performed by: NEUROLOGICAL SURGERY

## 2022-06-30 PROCEDURE — 3075F PR MOST RECENT SYSTOLIC BLOOD PRESS GE 130-139MM HG: ICD-10-PCS | Mod: CPTII,S$GLB,, | Performed by: NEUROLOGICAL SURGERY

## 2022-06-30 PROCEDURE — 3079F PR MOST RECENT DIASTOLIC BLOOD PRESSURE 80-89 MM HG: ICD-10-PCS | Mod: CPTII,S$GLB,, | Performed by: NEUROLOGICAL SURGERY

## 2022-06-30 PROCEDURE — 3075F SYST BP GE 130 - 139MM HG: CPT | Mod: CPTII,S$GLB,, | Performed by: NEUROLOGICAL SURGERY

## 2022-06-30 NOTE — PROGRESS NOTES
Interval history June 30, 2022:  Patient returns for re-evaluation with MRI cervical spine.  He reports constant neck pain with dense numbness involving the left posterior arm.  He notes intermittent left hand numbness.  He reports 2 episodes of transient dizziness lasting 20 seconds since his last evaluation.  No syncopal episodes in the last 2 weeks.  One episode occurred while standing in the other while driving.  He said undergoing loop recording with no anomalies noted to date.  He denies new or worsened neurologic symptoms.  He underwent a six-vessel cerebral angiogram which showed right vertebral artery tortuosity with negative vertebrobasilar insufficiency upon provocative compressive maneuver.    I reviewed MRI imaging cervical spine 06/13/2022 in detail with the patient today.  Pertinent findings include:   Previous ACDF C5-C7.  No hardware complications.  Mild spinal canal stenosis C4-5.  No residual spinal canal stenosis C5-6 C6-7.  Severe left foraminal stenosis C4-5. Moderate left C5-6 residual foraminal stenosis.   Moderate bilateral foraminal stenosis C6-7    I reviewed previous CT cervical spine with the patient.  No evidence of pseudoarthrosis at C5-6 C6-7    Neurologic exam is stable    Analysis:  We discussed the pros/cons of additional cervical surgery verses symptom management, including interventional procedures.  He prefers to avoid surgery given the increased complexity involved in addressing the pathology at C4-5 and/or C6-7 in the setting of previous ACDF C5-C7.  He is neurologically intact without signs of myelopathy.  The residual left foraminal stenosis at C6-7 appears most consistent with his symptoms.  I recommended a left C6-7 transforaminal LOKESH initially.  A left C4-5 foraminal LOKESH may be worthwhile as well.  I referred him to Dr. Pedraza for the procedure.  He is in agreement with the plan.        Interval history March 7, 2022:  He endorses ongoing symptoms as below.  He reports no  new syncopal events, though he avoids neck extension and rotation.  He denies new weakness or numbness.  He does endorse intermittent pallor involving the bilateral hands with neck rotation.     He underwent CT angiography of the head and neck which was reviewed.  No significant carotid or vertebral artery stenosis.  Questionable aneurysm/fistula in the right anterior medial middle cranial fossa.     He underwent went plain x-rays of the cervical spine which were reviewed.  Previous ACDF C5-7 without obvious hardware complication.  Degenerative disc changes C4-5.  Left C7 rib noted     Exam:     Awake, alert, oriented to person place and time.    Cranial nerves 2-12 grossly intact.    Strength is graded 5/5 in all muscle groups.    Sensation is grossly intact throughout.    Gait and stance are normal  Absent Elder sign  No hyperreflexia elicited     Analysis:  I have referred him to vascular neurology for evaluation and conventional four-vessel cerebral angiography.  In addition to the intracerebral vessel anomaly described on CT angiography, he may also have findings consistent with thoracic outlet syndrome and/or subclavian steal.  I have also ordered an MRI of the cervical spine relative to adjacent level disc disease at C4-5.  We will see him back in 4-6 weeks for re-evaluation.    HPI:  This is a very pleasant 61-year-old gentleman who presents with neck pain in the lower cervical region with radiation into the left shoulder.  He describes aching burning sensation in these regions.  He also endorses intermittent numbness and tingling involving the bilateral upper arms and hands.  He attributes the hand numbness to carpal tunnel syndrome.  He currently rates his neck pain as 5/10, arm pain 3/10.  He notes that these symptoms began in 2015 after lifting a 500 lb car in June.  He previously underwent anterior cervical fusion C5-C7 in 2009. Prior to this procedure he had intractable left arm pain.  His current  symptoms are dissimilar.  He denies focal extremity weakness, ataxia, bowel or bladder dysfunction.  He notes it previous cervical rhizotomy is provided 6 months pain relief.  Lastly, he notes that when extending and rotating his neck to the right, he approaches blacking out.  He does notes 1 syncopal event in which she was on the commode with his head tilted forward. His subsequently wife found him on the floor after he lost consciousness.  As he fell forward he struck his head against the wall.      He states he has been evaluated at neuro [facility].  He underwent MRI, MRA imaging of the brain both of which were interpreted as normal.   He also had a carotid ultrasound which was negative for stenosis or vessel anomaly     He underwent a MRI of the cervical spine at outside facility in March 2000.  Images not available.  Based on metallic artifact, a CT of the cervical spine with without contrast was ordered by Neurology.  This study is available for my review today.     He reports remote history of TIA after which he was started on Eliquis by a cardiologist, per the patient     Smoking status:  Nonsmoker

## 2022-07-11 DIAGNOSIS — E78.00 PURE HYPERCHOLESTEROLEMIA: ICD-10-CM

## 2022-07-11 RX ORDER — ROSUVASTATIN CALCIUM 10 MG/1
10 TABLET, COATED ORAL NIGHTLY
Qty: 90 TABLET | Refills: 0 | Status: SHIPPED | OUTPATIENT
Start: 2022-07-11 | End: 2022-09-14 | Stop reason: SDUPTHER

## 2022-07-19 ENCOUNTER — TELEPHONE (OUTPATIENT)
Dept: PAIN MEDICINE | Facility: CLINIC | Age: 62
End: 2022-07-19
Payer: MEDICAID

## 2022-07-19 NOTE — TELEPHONE ENCOUNTER
Returned call to patient to advise unable to schedule with PM at this time, offered to schedule in back & spine however no openings until Nov 1. Advised would redirect back to neurosurgery for possible new referral. Verbalizes understanding.

## 2022-07-19 NOTE — TELEPHONE ENCOUNTER
----- Message from Marguerite Nunez sent at 7/19/2022  4:07 PM CDT -----  Type:  Patient Call Back    Who Called: pt     What is the reqeust in detail: Pt Is requesting is requesting a call back in regards to schedule an appt, he has a referral written by Dr. Ibrahim in Neurosurgery. Please Advise     Can the clinic reply by [facility]?    Best Call Back Number:[phone]

## 2022-07-21 NOTE — TELEPHONE ENCOUNTER
Contacted pt and informed that he can contact his insurance company to discuss a facility/provider that is covered and we will be glad to send referral there. Pt voiced understanding.

## 2022-07-28 NOTE — TELEPHONE ENCOUNTER
Message sent to Neurosurgery in error. Message has been forward to Vascular Neurology.       Pt resides in a pvt home w/ family, 3 steps to enter, none inside. PTA pt was independent w/ all functional mobility & did not use an AD for ambulation. Pt lives in a condo +3 steps to enter with railing. Pt was amb (I) without an AD and (I) with all ADLs PTA. Pt states son lives nearby

## 2022-09-13 NOTE — PROGRESS NOTES
SUBJECTIVE:      Patient ID: Otis Colón is a 61 y.o. male.    Chief Complaint: Hypertension    Mr Colón is here today to f/u on htn and hyperlipidemia. He is following with Dr Thibodeaux for cardiology, has a loop recorder. Following with Dr Ibrahim for neck pain. He is taking his meds as prescribed. He is complaining of pain and popping in his right knee for years. Says it is getting worse over the past few months.     Hypertension  This is a chronic problem. The current episode started more than 1 year ago. The problem is unchanged. The problem is controlled. Associated symptoms include neck pain. Pertinent negatives include no anxiety, chest pain, headaches, palpitations, peripheral edema or shortness of breath. Risk factors for coronary artery disease include obesity, male gender and dyslipidemia. Past treatments include diuretics, angiotensin blockers, calcium channel blockers and beta blockers. The current treatment provides moderate improvement.   Hyperlipidemia  This is a chronic problem. The current episode started more than 1 year ago. The problem is controlled. Recent lipid tests were reviewed and are normal. Pertinent negatives include no chest pain or shortness of breath. Current antihyperlipidemic treatment includes statins. The current treatment provides significant improvement of lipids. Risk factors for coronary artery disease include hypertension, male sex, obesity and dyslipidemia.   Knee Pain   The incident occurred more than 1 week ago. There was no injury mechanism. The pain is present in the right knee. The quality of the pain is described as aching. The pain is moderate. The pain has been Constant since onset. The symptoms are aggravated by movement. He has tried ice, acetaminophen and NSAIDs for the symptoms. The treatment provided mild relief.     Past Surgical History:   Procedure Laterality Date    APPENDECTOMY      CERVICAL FUSION  2009    CROSS FINGER FLAP      FRACTURE  SURGERY      steel librado in right leg    KNEE ARTHROSCOPY Right     ORTHOPEDIC SURGERY Right     librado from knee to ankle    RHIZOTOMY  08/2016    SPINE SURGERY       Family History   Problem Relation Age of Onset    Hypertension Mother     No Known Problems Sister     No Known Problems Brother       Social History     Socioeconomic History    Marital status:    Tobacco Use    Smoking status: Never    Smokeless tobacco: Never   Substance and Sexual Activity    Alcohol use: No    Drug use: No    Sexual activity: Yes     Partners: Female     Social Determinants of Health     Financial Resource Strain: Low Risk     Difficulty of Paying Living Expenses: Not very hard   Food Insecurity: No Food Insecurity    Worried About Running Out of Food in the Last Year: Never true    Ran Out of Food in the Last Year: Never true   Transportation Needs: No Transportation Needs    Lack of Transportation (Medical): No    Lack of Transportation (Non-Medical): No   Physical Activity: Unknown    Days of Exercise per Week: 0 days   Social Connections: Unknown    Frequency of Communication with Friends and Family: More than three times a week    Frequency of Social Gatherings with Friends and Family: Once a week    Active Member of Clubs or Organizations: No    Marital Status:    Housing Stability: Low Risk     Unable to Pay for Housing in the Last Year: No    Number of Places Lived in the Last Year: 1    Unstable Housing in the Last Year: No     Current Outpatient Medications   Medication Sig Dispense Refill    apixaban (ELIQUIS) 5 mg Tab Take 5 mg by mouth 2 (two) times daily.      coQ10, ubiquinol, 100 mg Cap Take 1 capsule by mouth once daily. 90 capsule 0    metoprolol succinate (TOPROL-XL) 25 MG 24 hr tablet Take 25 mg by mouth once daily.      omeprazole (PRILOSEC) 40 MG capsule TAKE 1 CAPSULE(40 MG) BY MOUTH EVERY DAY 30 capsule 1    losartan-hydrochlorothiazide 50-12.5 mg (HYZAAR) 50-12.5 mg per tablet Take 1 tablet by  mouth once daily. 90 tablet 1    rosuvastatin (CRESTOR) 10 MG tablet Take 1 tablet (10 mg total) by mouth every evening. 90 tablet 1     No current facility-administered medications for this visit.     Review of patient's allergies indicates:  No Known Allergies   Past Medical History:   Diagnosis Date    CAD (coronary artery disease) 01/13/2017    Cervical spinal stenosis     Hyperlipidemia     Hypertension     Migraine headache     TIA (transient ischemic attack)      Past Surgical History:   Procedure Laterality Date    APPENDECTOMY      CERVICAL FUSION  2009    CROSS FINGER FLAP      FRACTURE SURGERY      steel librado in right leg    KNEE ARTHROSCOPY Right     ORTHOPEDIC SURGERY Right     librado from knee to ankle    RHIZOTOMY  08/2016    SPINE SURGERY         Review of Systems   Constitutional:  Positive for activity change. Negative for appetite change, chills, diaphoresis and unexpected weight change.   HENT:  Negative for ear discharge, facial swelling, hearing loss, nosebleeds, rhinorrhea and trouble swallowing.    Eyes:  Negative for photophobia, pain, discharge and visual disturbance.   Respiratory:  Negative for apnea, choking, chest tightness, shortness of breath and wheezing.    Cardiovascular:  Negative for chest pain and palpitations.   Gastrointestinal:  Negative for abdominal pain, blood in stool, constipation, diarrhea and vomiting.   Endocrine: Negative for polydipsia, polyphagia and polyuria.   Genitourinary:  Negative for difficulty urinating, hematuria and urgency.   Musculoskeletal:  Positive for arthralgias and neck pain. Negative for gait problem and joint swelling.   Skin:  Negative for pallor.   Neurological:  Positive for weakness. Negative for seizures, speech difficulty and headaches.   Hematological:  Does not bruise/bleed easily.   Psychiatric/Behavioral:  Negative for agitation, confusion, dysphoric mood and self-injury. The patient is not nervous/anxious.     OBJECTIVE:      Vitals:     "09/14/22 0816   BP: 110/80   Pulse: 67   Temp: 98.1 °F (36.7 °C)   SpO2: 98%   Weight: (!) 149.7 kg (330 lb)   Height: 6' 2" (1.88 m)     Physical Exam  Vitals and nursing note reviewed.   Constitutional:       General: He is not in acute distress.     Appearance: He is well-developed.   HENT:      Head: Normocephalic and atraumatic.      Nose: Nose normal.      Mouth/Throat:      Pharynx: Uvula midline.   Eyes:      General: Lids are normal.      Conjunctiva/sclera: Conjunctivae normal.      Pupils: Pupils are equal, round, and reactive to light.      Right eye: Pupil is round and reactive.      Left eye: Pupil is round and reactive.   Neck:      Thyroid: No thyromegaly.      Vascular: No carotid bruit.   Cardiovascular:      Rate and Rhythm: Normal rate and regular rhythm.      Pulses: Normal pulses.      Heart sounds: Normal heart sounds. No murmur heard.  Pulmonary:      Effort: Pulmonary effort is normal.      Breath sounds: Normal breath sounds. No wheezing, rhonchi or rales.   Abdominal:      General: Bowel sounds are normal.      Palpations: Abdomen is soft. Abdomen is not rigid.      Tenderness: There is no abdominal tenderness.   Musculoskeletal:         General: Normal range of motion.      Cervical back: Normal range of motion and neck supple.      Right knee: No swelling. Tenderness present over the lateral joint line.      Right lower leg: No edema.      Left lower leg: No edema.   Lymphadenopathy:      Cervical: No cervical adenopathy.   Skin:     General: Skin is warm and dry.      Nails: There is no clubbing.   Neurological:      Mental Status: He is alert and oriented to person, place, and time.   Psychiatric:         Mood and Affect: Mood normal.         Speech: Speech normal.         Behavior: Behavior normal. Behavior is cooperative.         Thought Content: Thought content normal.         Judgment: Judgment normal.      Assessment:       1. Essential hypertension    2. Pure hypercholesterolemia "    3. Screening PSA (prostate specific antigen)    4. Elevated glucose    5. Chronic pain of right knee        Plan:       Essential hypertension  -     Comprehensive Metabolic Panel; Future; Expected date: 09/28/2022  -     Lipid Panel; Future; Expected date: 09/28/2022  -     Hemoglobin A1C; Future; Expected date: 09/28/2022  -     losartan-hydrochlorothiazide 50-12.5 mg (HYZAAR) 50-12.5 mg per tablet; Take 1 tablet by mouth once daily.  Dispense: 90 tablet; Refill: 1    Pure hypercholesterolemia  -     Comprehensive Metabolic Panel; Future; Expected date: 09/28/2022  -     Lipid Panel; Future; Expected date: 09/28/2022  -     rosuvastatin (CRESTOR) 10 MG tablet; Take 1 tablet (10 mg total) by mouth every evening.  Dispense: 90 tablet; Refill: 1    Screening PSA (prostate specific antigen)  -     PSA, Screening; Future; Expected date: 09/28/2022    Elevated glucose  -     Comprehensive Metabolic Panel; Future; Expected date: 09/28/2022  -     Hemoglobin A1C; Future; Expected date: 09/28/2022    Chronic pain of right knee  -     Ambulatory referral/consult to Orthopedics; Future; Expected date: 09/21/2022      Follow up in about 6 months (around 3/14/2023) for htn.      9/14/2022 PHILLIP Rivera, EMERYP

## 2022-09-14 ENCOUNTER — OFFICE VISIT (OUTPATIENT)
Dept: FAMILY MEDICINE | Facility: CLINIC | Age: 62
End: 2022-09-14
Payer: MEDICAID

## 2022-09-14 VITALS
BODY MASS INDEX: 40.43 KG/M2 | TEMPERATURE: 98 F | SYSTOLIC BLOOD PRESSURE: 110 MMHG | OXYGEN SATURATION: 98 % | HEART RATE: 67 BPM | DIASTOLIC BLOOD PRESSURE: 80 MMHG | WEIGHT: 315 LBS | HEIGHT: 74 IN

## 2022-09-14 DIAGNOSIS — E78.00 PURE HYPERCHOLESTEROLEMIA: ICD-10-CM

## 2022-09-14 DIAGNOSIS — M25.561 CHRONIC PAIN OF RIGHT KNEE: ICD-10-CM

## 2022-09-14 DIAGNOSIS — I10 ESSENTIAL HYPERTENSION: Primary | ICD-10-CM

## 2022-09-14 DIAGNOSIS — R73.09 ELEVATED GLUCOSE: ICD-10-CM

## 2022-09-14 DIAGNOSIS — G89.29 CHRONIC PAIN OF RIGHT KNEE: ICD-10-CM

## 2022-09-14 DIAGNOSIS — Z12.5 SCREENING PSA (PROSTATE SPECIFIC ANTIGEN): ICD-10-CM

## 2022-09-14 PROCEDURE — 3008F BODY MASS INDEX DOCD: CPT | Mod: CPTII,S$GLB,, | Performed by: NURSE PRACTITIONER

## 2022-09-14 PROCEDURE — 99214 OFFICE O/P EST MOD 30 MIN: CPT | Mod: S$GLB,,, | Performed by: NURSE PRACTITIONER

## 2022-09-14 PROCEDURE — 3044F HG A1C LEVEL LT 7.0%: CPT | Mod: CPTII,S$GLB,, | Performed by: NURSE PRACTITIONER

## 2022-09-14 PROCEDURE — 3044F PR MOST RECENT HEMOGLOBIN A1C LEVEL <7.0%: ICD-10-PCS | Mod: CPTII,S$GLB,, | Performed by: NURSE PRACTITIONER

## 2022-09-14 PROCEDURE — 3008F PR BODY MASS INDEX (BMI) DOCUMENTED: ICD-10-PCS | Mod: CPTII,S$GLB,, | Performed by: NURSE PRACTITIONER

## 2022-09-14 PROCEDURE — 1160F RVW MEDS BY RX/DR IN RCRD: CPT | Mod: CPTII,S$GLB,, | Performed by: NURSE PRACTITIONER

## 2022-09-14 PROCEDURE — 99214 PR OFFICE/OUTPT VISIT, EST, LEVL IV, 30-39 MIN: ICD-10-PCS | Mod: S$GLB,,, | Performed by: NURSE PRACTITIONER

## 2022-09-14 PROCEDURE — 3079F DIAST BP 80-89 MM HG: CPT | Mod: CPTII,S$GLB,, | Performed by: NURSE PRACTITIONER

## 2022-09-14 PROCEDURE — 1159F MED LIST DOCD IN RCRD: CPT | Mod: CPTII,S$GLB,, | Performed by: NURSE PRACTITIONER

## 2022-09-14 PROCEDURE — 3079F PR MOST RECENT DIASTOLIC BLOOD PRESSURE 80-89 MM HG: ICD-10-PCS | Mod: CPTII,S$GLB,, | Performed by: NURSE PRACTITIONER

## 2022-09-14 PROCEDURE — 3074F PR MOST RECENT SYSTOLIC BLOOD PRESSURE < 130 MM HG: ICD-10-PCS | Mod: CPTII,S$GLB,, | Performed by: NURSE PRACTITIONER

## 2022-09-14 PROCEDURE — 3074F SYST BP LT 130 MM HG: CPT | Mod: CPTII,S$GLB,, | Performed by: NURSE PRACTITIONER

## 2022-09-14 PROCEDURE — 1160F PR REVIEW ALL MEDS BY PRESCRIBER/CLIN PHARMACIST DOCUMENTED: ICD-10-PCS | Mod: CPTII,S$GLB,, | Performed by: NURSE PRACTITIONER

## 2022-09-14 PROCEDURE — 1159F PR MEDICATION LIST DOCUMENTED IN MEDICAL RECORD: ICD-10-PCS | Mod: CPTII,S$GLB,, | Performed by: NURSE PRACTITIONER

## 2022-09-14 RX ORDER — LOSARTAN POTASSIUM AND HYDROCHLOROTHIAZIDE 12.5; 5 MG/1; MG/1
1 TABLET ORAL DAILY
Qty: 90 TABLET | Refills: 1 | Status: SHIPPED | OUTPATIENT
Start: 2022-09-14 | End: 2023-03-10

## 2022-09-14 RX ORDER — ROSUVASTATIN CALCIUM 10 MG/1
10 TABLET, COATED ORAL NIGHTLY
Qty: 90 TABLET | Refills: 1 | Status: SHIPPED | OUTPATIENT
Start: 2022-09-14 | End: 2022-10-11

## 2022-09-20 ENCOUNTER — OFFICE VISIT (OUTPATIENT)
Dept: ORTHOPEDICS | Facility: CLINIC | Age: 62
End: 2022-09-20
Payer: MEDICAID

## 2022-09-20 VITALS
WEIGHT: 315 LBS | HEIGHT: 74 IN | BODY MASS INDEX: 40.43 KG/M2 | DIASTOLIC BLOOD PRESSURE: 88 MMHG | SYSTOLIC BLOOD PRESSURE: 133 MMHG

## 2022-09-20 DIAGNOSIS — Z98.890 HISTORY OF ARTHROSCOPY OF RIGHT KNEE: Primary | ICD-10-CM

## 2022-09-20 DIAGNOSIS — M17.31 POST-TRAUMATIC OSTEOARTHRITIS OF RIGHT KNEE: ICD-10-CM

## 2022-09-20 PROCEDURE — 20610 DRAIN/INJ JOINT/BURSA W/O US: CPT | Mod: RT,S$GLB,, | Performed by: ORTHOPAEDIC SURGERY

## 2022-09-20 PROCEDURE — 3079F PR MOST RECENT DIASTOLIC BLOOD PRESSURE 80-89 MM HG: ICD-10-PCS | Mod: CPTII,S$GLB,, | Performed by: ORTHOPAEDIC SURGERY

## 2022-09-20 PROCEDURE — 3008F PR BODY MASS INDEX (BMI) DOCUMENTED: ICD-10-PCS | Mod: CPTII,S$GLB,, | Performed by: ORTHOPAEDIC SURGERY

## 2022-09-20 PROCEDURE — 99203 OFFICE O/P NEW LOW 30 MIN: CPT | Mod: 25,S$GLB,, | Performed by: ORTHOPAEDIC SURGERY

## 2022-09-20 PROCEDURE — 3044F PR MOST RECENT HEMOGLOBIN A1C LEVEL <7.0%: ICD-10-PCS | Mod: CPTII,S$GLB,, | Performed by: ORTHOPAEDIC SURGERY

## 2022-09-20 PROCEDURE — 3008F BODY MASS INDEX DOCD: CPT | Mod: CPTII,S$GLB,, | Performed by: ORTHOPAEDIC SURGERY

## 2022-09-20 PROCEDURE — 99203 PR OFFICE/OUTPT VISIT, NEW, LEVL III, 30-44 MIN: ICD-10-PCS | Mod: 25,S$GLB,, | Performed by: ORTHOPAEDIC SURGERY

## 2022-09-20 PROCEDURE — 1159F MED LIST DOCD IN RCRD: CPT | Mod: CPTII,S$GLB,, | Performed by: ORTHOPAEDIC SURGERY

## 2022-09-20 PROCEDURE — 3079F DIAST BP 80-89 MM HG: CPT | Mod: CPTII,S$GLB,, | Performed by: ORTHOPAEDIC SURGERY

## 2022-09-20 PROCEDURE — 3044F HG A1C LEVEL LT 7.0%: CPT | Mod: CPTII,S$GLB,, | Performed by: ORTHOPAEDIC SURGERY

## 2022-09-20 PROCEDURE — 1159F PR MEDICATION LIST DOCUMENTED IN MEDICAL RECORD: ICD-10-PCS | Mod: CPTII,S$GLB,, | Performed by: ORTHOPAEDIC SURGERY

## 2022-09-20 PROCEDURE — 3075F SYST BP GE 130 - 139MM HG: CPT | Mod: CPTII,S$GLB,, | Performed by: ORTHOPAEDIC SURGERY

## 2022-09-20 PROCEDURE — 3075F PR MOST RECENT SYSTOLIC BLOOD PRESS GE 130-139MM HG: ICD-10-PCS | Mod: CPTII,S$GLB,, | Performed by: ORTHOPAEDIC SURGERY

## 2022-09-20 PROCEDURE — 20610 LARGE JOINT ASPIRATION/INJECTION: R KNEE: ICD-10-PCS | Mod: RT,S$GLB,, | Performed by: ORTHOPAEDIC SURGERY

## 2022-09-20 RX ORDER — TRIAMCINOLONE ACETONIDE 40 MG/ML
40 INJECTION, SUSPENSION INTRA-ARTICULAR; INTRAMUSCULAR
Status: DISCONTINUED | OUTPATIENT
Start: 2022-09-20 | End: 2022-09-20 | Stop reason: HOSPADM

## 2022-09-20 RX ADMIN — TRIAMCINOLONE ACETONIDE 40 MG: 40 INJECTION, SUSPENSION INTRA-ARTICULAR; INTRAMUSCULAR at 12:09

## 2022-09-20 NOTE — PROGRESS NOTES
[facility]    Subjective:     Chief Complaint:   Chief Complaint   Patient presents with    Right Knee - Pain     Right knee pain that has been ongoing for years. Hx of scope about 30 years ago. States that when he moves a certain way, the knee crunches and feels like it is going to give out       Past Medical History:   Diagnosis Date    CAD (coronary artery disease) 01/13/2017    Cervical spinal stenosis     Hyperlipidemia     Hypertension     Migraine headache     TIA (transient ischemic attack)        Past Surgical History:   Procedure Laterality Date    APPENDECTOMY      CERVICAL FUSION  2009    CROSS FINGER FLAP      FRACTURE SURGERY      steel librado in right leg    KNEE ARTHROSCOPY Right     ORTHOPEDIC SURGERY Right     librado from knee to ankle    RHIZOTOMY  08/2016    SPINE SURGERY         Current Outpatient Medications   Medication Sig    apixaban (ELIQUIS) 5 mg Tab Take 5 mg by mouth 2 (two) times daily.    coQ10, ubiquinol, 100 mg Cap Take 1 capsule by mouth once daily.    losartan-hydrochlorothiazide 50-12.5 mg (HYZAAR) 50-12.5 mg per tablet Take 1 tablet by mouth once daily.    metoprolol succinate (TOPROL-XL) 25 MG 24 hr tablet Take 25 mg by mouth once daily.    omeprazole (PRILOSEC) 40 MG capsule Take 1 capsule (40 mg total) by mouth once daily.    rosuvastatin (CRESTOR) 10 MG tablet Take 1 tablet (10 mg total) by mouth every evening.     No current facility-administered medications for this visit.       Review of patient's allergies indicates:  No Known Allergies    Family History   Problem Relation Age of Onset    Hypertension Mother     No Known Problems Sister     No Known Problems Brother        Social History     Socioeconomic History    Marital status:    Tobacco Use    Smoking status: Never    Smokeless tobacco: Never   Substance and Sexual Activity    Alcohol use: No    Drug use: No    Sexual activity: Yes     Partners: Female     Social Determinants of Health     Financial  Resource Strain: Low Risk     Difficulty of Paying Living Expenses: Not very hard   Food Insecurity: No Food Insecurity    Worried About Running Out of Food in the Last Year: Never true    Ran Out of Food in the Last Year: Never true   Transportation Needs: No Transportation Needs    Lack of Transportation (Medical): No    Lack of Transportation (Non-Medical): No   Physical Activity: Unknown    Days of Exercise per Week: 0 days   Social Connections: Unknown    Frequency of Communication with Friends and Family: More than three times a week    Frequency of Social Gatherings with Friends and Family: Once a week    Active Member of Clubs or Organizations: No    Marital Status:    Housing Stability: Low Risk     Unable to Pay for Housing in the Last Year: No    Number of Places Lived in the Last Year: 1    Unstable Housing in the Last Year: No       History of present illness:  59-year-old male presents to clinic today to follow-up on his right knee.  He does have a history of right knee problems.  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  He was also reported to have arthroscopy of the right knee for a locked lateral meniscus.  He is a bit pigeon toed and knock-kneed.      Review of Systems:    Constitution: Negative for chills, fever, and sweats.  Negative for unexplained weight loss.    HENT:  Negative for headaches and blurry vision.    Cardiovascular:Negative for chest pain or irregular heart beat. Negative for hypertension.    Respiratory:  Negative for cough and shortness of breath.    Gastrointestinal: Negative for abdominal pain, heartburn, melena, nausea, and vomitting.    Genitourinary:  Negative bladder incontinence and dysuria.    Musculoskeletal:  See HPI for details.     Neurological: Negative for numbness.    Psychiatric/Behavioral: Negative for depression.  The patient is not  "nervous/anxious.      Endocrine: Negative for polyuria    Hematologic/Lymphatic: Negative for bleeding problem.  Does not bruise/bleed easily.    Skin: Negative for poor would healing and rash    Objective:      Physical Examination:    Vital Signs:    Vitals:    09/20/22 1244   BP: 133/88       Body mass index is 42.37 kg/m².    This a well-developed, well nourished patient in no acute distress.  They are alert and oriented and cooperative to examination.        Examination of the right knee, +1 effusion.  Skin is dry and intact.  No erythema ecchymosis, no signs symptoms of infection.  Range of motion 0-120 degrees.  Tender over the lateral joint line, tender over the medial joint line.  Pain with lateral Booker's testing.  Calf soft nontender straight leg raise negative.    Pertinent New Results:    XRAY Report / Interpretation:   AP lateral sunrise views of the right knee taken today in the office demonstrate advanced patellofemoral and lateral compartment changes.    Assessment/Plan:      Right knee pain, posttraumatic arthritis, history of arthroscopy, patient with primarily lateral compartment arthritis of the right knee.  We injected the right knee today with lidocaine and triamcinolone, anterior lateral approach, sterile technique patient tolerated well.  Patient may need a knee replacement at some point in the future.  This is initial treatment for his arthritis.  See how he responds.  Follow up 6 weeks.    Phong Seth, Physician Assistant, served in the capacity as a "scribe" for this patient encounter.  A "face-to-face" encounter occurred with Dr. Jeovany Marie on this date.  The treatment plan and medical decision-making is outlined above. Patient was seen and examined with a chaperone.       This note was created using Dragon voice recognition software that occasionally misinterpreted phrases or words.          "

## 2022-09-20 NOTE — PROCEDURES
Large Joint Aspiration/Injection: R knee    Date/Time: 9/20/2022 12:45 PM  Performed by: Jeovany Marie MD  Authorized by: Jeovany Marie MD     Consent Done?:  Yes (Verbal)  Indications:  Pain  Site marked: the procedure site was marked    Timeout: prior to procedure the correct patient, procedure, and site was verified    Prep: patient was prepped and draped in usual sterile fashion      Local anesthesia used?: Yes    Local anesthetic:  Lidocaine 1% without epinephrine    Details:  Needle Size:  25 G  Ultrasonic Guidance for needle placement?: No    Location:  Knee  Site:  R knee  Medications:  40 mg triamcinolone acetonide 40 mg/mL  Patient tolerance:  Patient tolerated the procedure well with no immediate complications

## 2022-11-01 ENCOUNTER — OFFICE VISIT (OUTPATIENT)
Dept: ORTHOPEDICS | Facility: CLINIC | Age: 62
End: 2022-11-01
Payer: MEDICAID

## 2022-11-01 VITALS
DIASTOLIC BLOOD PRESSURE: 88 MMHG | HEIGHT: 74 IN | SYSTOLIC BLOOD PRESSURE: 133 MMHG | WEIGHT: 315 LBS | BODY MASS INDEX: 40.43 KG/M2

## 2022-11-01 DIAGNOSIS — M23.200 OTHER OLD TEAR OF LATERAL MENISCUS OF RIGHT KNEE: ICD-10-CM

## 2022-11-01 DIAGNOSIS — Z98.890 HISTORY OF ARTHROSCOPY OF RIGHT KNEE: Primary | ICD-10-CM

## 2022-11-01 DIAGNOSIS — M17.31 POST-TRAUMATIC OSTEOARTHRITIS OF RIGHT KNEE: ICD-10-CM

## 2022-11-01 PROCEDURE — 3044F HG A1C LEVEL LT 7.0%: CPT | Mod: CPTII,S$GLB,, | Performed by: ORTHOPAEDIC SURGERY

## 2022-11-01 PROCEDURE — 1160F RVW MEDS BY RX/DR IN RCRD: CPT | Mod: CPTII,S$GLB,, | Performed by: ORTHOPAEDIC SURGERY

## 2022-11-01 PROCEDURE — 3079F DIAST BP 80-89 MM HG: CPT | Mod: CPTII,S$GLB,, | Performed by: ORTHOPAEDIC SURGERY

## 2022-11-01 PROCEDURE — 3008F BODY MASS INDEX DOCD: CPT | Mod: CPTII,S$GLB,, | Performed by: ORTHOPAEDIC SURGERY

## 2022-11-01 PROCEDURE — 3044F PR MOST RECENT HEMOGLOBIN A1C LEVEL <7.0%: ICD-10-PCS | Mod: CPTII,S$GLB,, | Performed by: ORTHOPAEDIC SURGERY

## 2022-11-01 PROCEDURE — 3008F PR BODY MASS INDEX (BMI) DOCUMENTED: ICD-10-PCS | Mod: CPTII,S$GLB,, | Performed by: ORTHOPAEDIC SURGERY

## 2022-11-01 PROCEDURE — 3079F PR MOST RECENT DIASTOLIC BLOOD PRESSURE 80-89 MM HG: ICD-10-PCS | Mod: CPTII,S$GLB,, | Performed by: ORTHOPAEDIC SURGERY

## 2022-11-01 PROCEDURE — 1159F PR MEDICATION LIST DOCUMENTED IN MEDICAL RECORD: ICD-10-PCS | Mod: CPTII,S$GLB,, | Performed by: ORTHOPAEDIC SURGERY

## 2022-11-01 PROCEDURE — 99213 PR OFFICE/OUTPT VISIT, EST, LEVL III, 20-29 MIN: ICD-10-PCS | Mod: S$GLB,,, | Performed by: ORTHOPAEDIC SURGERY

## 2022-11-01 PROCEDURE — 99213 OFFICE O/P EST LOW 20 MIN: CPT | Mod: S$GLB,,, | Performed by: ORTHOPAEDIC SURGERY

## 2022-11-01 PROCEDURE — 3075F PR MOST RECENT SYSTOLIC BLOOD PRESS GE 130-139MM HG: ICD-10-PCS | Mod: CPTII,S$GLB,, | Performed by: ORTHOPAEDIC SURGERY

## 2022-11-01 PROCEDURE — 3075F SYST BP GE 130 - 139MM HG: CPT | Mod: CPTII,S$GLB,, | Performed by: ORTHOPAEDIC SURGERY

## 2022-11-01 PROCEDURE — 1159F MED LIST DOCD IN RCRD: CPT | Mod: CPTII,S$GLB,, | Performed by: ORTHOPAEDIC SURGERY

## 2022-11-01 PROCEDURE — 1160F PR REVIEW ALL MEDS BY PRESCRIBER/CLIN PHARMACIST DOCUMENTED: ICD-10-PCS | Mod: CPTII,S$GLB,, | Performed by: ORTHOPAEDIC SURGERY

## 2022-11-01 NOTE — PROGRESS NOTES
"[facility]    Subjective:     Chief Complaint:   Chief Complaint   Patient presents with    Right Knee - Pain     Right knee pain follow up.Received inj 09/20/22 which offered relief for a few days. The pain has since returned. States that the knee constantly "pops out of socket" and it is very painful       Past Medical History:   Diagnosis Date    CAD (coronary artery disease) 01/13/2017    Cervical spinal stenosis     Hyperlipidemia     Hypertension     Migraine headache     TIA (transient ischemic attack)        Past Surgical History:   Procedure Laterality Date    APPENDECTOMY      CERVICAL FUSION  2009    CROSS FINGER FLAP      FRACTURE SURGERY      steel librado in right leg    KNEE ARTHROSCOPY Right     ORTHOPEDIC SURGERY Right     librado from knee to ankle    RHIZOTOMY  08/2016    SPINE SURGERY         Current Outpatient Medications   Medication Sig    apixaban (ELIQUIS) 5 mg Tab Take 5 mg by mouth 2 (two) times daily.    coQ10, ubiquinol, 100 mg Cap Take 1 capsule by mouth once daily.    losartan-hydrochlorothiazide 50-12.5 mg (HYZAAR) 50-12.5 mg per tablet Take 1 tablet by mouth once daily.    metoprolol succinate (TOPROL-XL) 25 MG 24 hr tablet Take 25 mg by mouth once daily.    omeprazole (PRILOSEC) 40 MG capsule Take 1 capsule (40 mg total) by mouth once daily.    rosuvastatin (CRESTOR) 10 MG tablet TAKE 1 TABLET(10 MG) BY MOUTH EVERY EVENING     No current facility-administered medications for this visit.       Review of patient's allergies indicates:  No Known Allergies    Family History   Problem Relation Age of Onset    Hypertension Mother     No Known Problems Sister     No Known Problems Brother        Social History     Socioeconomic History    Marital status:    Tobacco Use    Smoking status: Never    Smokeless tobacco: Never   Substance and Sexual Activity    Alcohol use: No    Drug use: No    Sexual activity: Yes     Partners: Female     Social Determinants of Health     Financial " Resource Strain: Low Risk     Difficulty of Paying Living Expenses: Not very hard   Food Insecurity: No Food Insecurity    Worried About Running Out of Food in the Last Year: Never true    Ran Out of Food in the Last Year: Never true   Transportation Needs: No Transportation Needs    Lack of Transportation (Medical): No    Lack of Transportation (Non-Medical): No   Physical Activity: Unknown    Days of Exercise per Week: 0 days   Social Connections: Unknown    Frequency of Communication with Friends and Family: More than three times a week    Frequency of Social Gatherings with Friends and Family: Once a week    Active Member of Clubs or Organizations: No    Marital Status:    Housing Stability: Low Risk     Unable to Pay for Housing in the Last Year: No    Number of Places Lived in the Last Year: 1    Unstable Housing in the Last Year: No       History of present illness: 61-year-old male presents to clinic today to follow-up on his right knee.  We injected him in mid September, it has been approximately 6 weeks.  He got about 2 weeks of relief in his symptoms from the injection but has since worn off and he continues to have right knee pain.    He does have a history of right knee problems.  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  He was also reported to have arthroscopy of the right knee for a locked lateral meniscus.  He is a bit pigeon toed and knock-kneed.      Review of Systems:    Constitution: Negative for chills, fever, and sweats.  Negative for unexplained weight loss.    HENT:  Negative for headaches and blurry vision.    Cardiovascular:Negative for chest pain or irregular heart beat. Negative for hypertension.    Respiratory:  Negative for cough and shortness of breath.    Gastrointestinal: Negative for abdominal pain, heartburn, melena, nausea, and  "vomitting.    Genitourinary:  Negative bladder incontinence and dysuria.    Musculoskeletal:  See HPI for details.     Neurological: Negative for numbness.    Psychiatric/Behavioral: Negative for depression.  The patient is not nervous/anxious.      Endocrine: Negative for polyuria    Hematologic/Lymphatic: Negative for bleeding problem.  Does not bruise/bleed easily.    Skin: Negative for poor would healing and rash    Objective:      Physical Examination:    Vital Signs:    Vitals:    11/01/22 1304   BP: 133/88       Body mass index is 42.37 kg/m².    This a well-developed, well nourished patient in no acute distress.  They are alert and oriented and cooperative to examination.        Examination of the right knee, skin is dry and intact.  No erythema ecchymosis, no signs symptoms of infection.  Range of motion 0-120 degrees.  Tender over the lateral joint line, tender over the medial joint line.  Pain with lateral Booker's testing.  Calf soft nontender straight leg raise negative.    Pertinent New Results:    XRAY Report / Interpretation:       Assessment/Plan:      61-year-old male with right knee pain.  He has a librado in that right leg from an injury when he was really young, a teenager.  He also reports arthroscopy of the right knee for a meniscal tear many years ago as well.  He does have primarily patellofemoral and lateral compartment arthritis.  But joint space seems to be fairly well preserved.  I would like to do an MRI to evaluate the articular surfaces and see if he has a lesion amenable to arthroscopy prior to recommending total knee arthroplasty.  Ultimately I think he will need a total knee arthroplasty at some point.    Phong Seth, Physician Assistant, served in the capacity as a "scribe" for this patient encounter.  A "face-to-face" encounter occurred with Dr. Jeovany Marie on this date.  The treatment plan and medical decision-making is outlined above. Patient was seen and examined with a " chaperone.       This note was created using Dragon voice recognition software that occasionally misinterpreted phrases or words.

## 2022-11-17 ENCOUNTER — HOSPITAL ENCOUNTER (OUTPATIENT)
Dept: RADIOLOGY | Facility: HOSPITAL | Age: 62
Discharge: HOME | End: 2022-11-17
Attending: ORTHOPAEDIC SURGERY
Payer: MEDICAID

## 2022-11-17 DIAGNOSIS — M23.200 OTHER OLD TEAR OF LATERAL MENISCUS OF RIGHT KNEE: ICD-10-CM

## 2022-11-17 PROCEDURE — 73721 MRI JNT OF LWR EXTRE W/O DYE: CPT | Mod: TC,PO,RT

## 2022-12-01 ENCOUNTER — OFFICE VISIT (OUTPATIENT)
Dept: ORTHOPEDICS | Facility: CLINIC | Age: 62
End: 2022-12-01
Payer: MEDICAID

## 2022-12-01 VITALS — WEIGHT: 315 LBS | BODY MASS INDEX: 40.43 KG/M2 | HEIGHT: 74 IN

## 2022-12-01 DIAGNOSIS — M23.200 OTHER OLD TEAR OF LATERAL MENISCUS OF RIGHT KNEE: ICD-10-CM

## 2022-12-01 DIAGNOSIS — M17.31 POST-TRAUMATIC OSTEOARTHRITIS OF RIGHT KNEE: Primary | ICD-10-CM

## 2022-12-01 DIAGNOSIS — M17.31 POST-TRAUMATIC OSTEOARTHRITIS OF ONE KNEE, RIGHT: ICD-10-CM

## 2022-12-01 DIAGNOSIS — M17.31 POST-TRAUMATIC OSTEOARTHRITIS OF RIGHT KNEE: ICD-10-CM

## 2022-12-01 DIAGNOSIS — Z98.890 HISTORY OF ARTHROSCOPY OF RIGHT KNEE: Primary | ICD-10-CM

## 2022-12-01 PROCEDURE — 1159F MED LIST DOCD IN RCRD: CPT | Mod: CPTII,S$GLB,, | Performed by: PHYSICIAN ASSISTANT

## 2022-12-01 PROCEDURE — 3008F BODY MASS INDEX DOCD: CPT | Mod: CPTII,S$GLB,, | Performed by: PHYSICIAN ASSISTANT

## 2022-12-01 PROCEDURE — 3008F PR BODY MASS INDEX (BMI) DOCUMENTED: ICD-10-PCS | Mod: CPTII,S$GLB,, | Performed by: PHYSICIAN ASSISTANT

## 2022-12-01 PROCEDURE — 3044F HG A1C LEVEL LT 7.0%: CPT | Mod: CPTII,S$GLB,, | Performed by: PHYSICIAN ASSISTANT

## 2022-12-01 PROCEDURE — 99213 PR OFFICE/OUTPT VISIT, EST, LEVL III, 20-29 MIN: ICD-10-PCS | Mod: S$GLB,,, | Performed by: PHYSICIAN ASSISTANT

## 2022-12-01 PROCEDURE — 1160F PR REVIEW ALL MEDS BY PRESCRIBER/CLIN PHARMACIST DOCUMENTED: ICD-10-PCS | Mod: CPTII,S$GLB,, | Performed by: PHYSICIAN ASSISTANT

## 2022-12-01 PROCEDURE — 1159F PR MEDICATION LIST DOCUMENTED IN MEDICAL RECORD: ICD-10-PCS | Mod: CPTII,S$GLB,, | Performed by: PHYSICIAN ASSISTANT

## 2022-12-01 PROCEDURE — 99213 OFFICE O/P EST LOW 20 MIN: CPT | Mod: S$GLB,,, | Performed by: PHYSICIAN ASSISTANT

## 2022-12-01 PROCEDURE — 1160F RVW MEDS BY RX/DR IN RCRD: CPT | Mod: CPTII,S$GLB,, | Performed by: PHYSICIAN ASSISTANT

## 2022-12-01 PROCEDURE — 3044F PR MOST RECENT HEMOGLOBIN A1C LEVEL <7.0%: ICD-10-PCS | Mod: CPTII,S$GLB,, | Performed by: PHYSICIAN ASSISTANT

## 2022-12-01 NOTE — H&P (VIEW-ONLY)
[facility]THOPED[facility]  [address]  [place] [address]  Phone: [phone]   [phone]    Patient's PCP: Eric Mercado NP  Referring Provider: No ref. provider found    Subjective:      Chief Complaint:   Chief Complaint   Patient presents with    Right Knee - Pain     Here for MRI Right Knee results,        Past Medical History:   Diagnosis Date    CAD (coronary artery disease) 01/13/2017    Cervical spinal stenosis     Hyperlipidemia     Hypertension     Migraine headache     TIA (transient ischemic attack)        Past Surgical History:   Procedure Laterality Date    APPENDECTOMY      CERVICAL FUSION  2009    CROSS FINGER FLAP      FRACTURE SURGERY      steel librado in right leg    KNEE ARTHROSCOPY Right     ORTHOPEDIC SURGERY Right     librado from knee to ankle    RHIZOTOMY  08/2016    SPINE SURGERY         Current Outpatient Medications   Medication Sig    coQ10, ubiquinol, 100 mg Cap Take 1 capsule by mouth once daily.    losartan-hydrochlorothiazide 50-12.5 mg (HYZAAR) 50-12.5 mg per tablet Take 1 tablet by mouth once daily.    metoprolol succinate (TOPROL-XL) 25 MG 24 hr tablet Take 25 mg by mouth once daily.    omeprazole (PRILOSEC) 40 MG capsule Take 1 capsule (40 mg total) by mouth once daily.    rosuvastatin (CRESTOR) 10 MG tablet TAKE 1 TABLET(10 MG) BY MOUTH EVERY EVENING    apixaban (ELIQUIS) 5 mg Tab Take 5 mg by mouth 2 (two) times daily.     No current facility-administered medications for this visit.       Review of patient's allergies indicates:  No Known Allergies    Family History   Problem Relation Age of Onset    Hypertension Mother     No Known Problems Sister     No Known Problems Brother        Social History     Socioeconomic History    Marital status:    Tobacco Use    Smoking status: Never    Smokeless tobacco: Never   Substance and Sexual Activity    Alcohol use: No    Drug use: No    Sexual activity: Yes     Partners: Female     Social Determinants of Health      Financial Resource Strain: Low Risk     Difficulty of Paying Living Expenses: Not very hard   Food Insecurity: No Food Insecurity    Worried About Running Out of Food in the Last Year: Never true    Ran Out of Food in the Last Year: Never true   Transportation Needs: No Transportation Needs    Lack of Transportation (Medical): No    Lack of Transportation (Non-Medical): No   Physical Activity: Unknown    Days of Exercise per Week: 0 days   Social Connections: Unknown    Frequency of Communication with Friends and Family: More than three times a week    Frequency of Social Gatherings with Friends and Family: Once a week    Active Member of Clubs or Organizations: No    Marital Status:    Housing Stability: Low Risk     Unable to Pay for Housing in the Last Year: No    Number of Places Lived in the Last Year: 1    Unstable Housing in the Last Year: No       History of present illness:  Otis comes in today for follow-up for his right knee.  He has had his MRI.  He is here today with those results.    Review of Systems:    Constitutional: Negative for chills, fever and weight loss.   HENT: Negative for congestion.    Eyes: Negative for discharge and redness.   Respiratory: Negative for cough and shortness of breath.    Cardiovascular: Negative for chest pain.   Gastrointestinal: Negative for nausea and vomiting.   Musculoskeletal: See HPI.   Skin: Negative for rash.   Neurological: Negative for headaches.   Endo/Heme/Allergies: Does not bruise/bleed easily.   Psychiatric/Behavioral: The patient is not nervous/anxious.    All other systems reviewed and are negative.       Objective:      Physical Examination:    Vital Signs:  There were no vitals filed for this visit.    Body mass index is 42.37 kg/m².    This a well-developed, well nourished patient in no acute distress.  They are alert and oriented and cooperative to examination.     Right knee exam:  Skin to the right knee is clean dry and intact.  There  09-May-2019 is no erythema or ecchymosis.  There are no signs or symptoms of infection.  Patient is neurovascularly intact throughout the right lower extremity.  Right calf is soft and nontender.  Right knee range of motion is approximately 0-120 degrees.  His right knee is stable to varus and valgus stresses while held in extension.  He does have some fairly diffuse tenderness to palpation of the right knee on the lateral aspect.  He can weightbear as tolerated on his right lower extremity.  He has a negative straight leg raise on the right.    Pertinent New Results:        XRAY Report / Interpretation:   No new radiographs were taken on today's clinic visit.  However did review images report of his right knee MRI which was significant for complex tearing of the body, posterior horn, and anterior horn of the lateral meniscus.  He does have osteoarthritic changes in the lateral compartment as well as a lateral patella facet with subchondral cystic changes.      Assessment:       1. History of arthroscopy of right knee    2. Post-traumatic osteoarthritis of right knee    3. Other old tear of lateral meniscus of right knee      Plan:     History of arthroscopy of right knee    Post-traumatic osteoarthritis of right knee    Other old tear of lateral meniscus of right knee      No follow-ups on file.    Patient has a right knee lateral meniscus tear with osteoarthritis.  Long discussion with him today about the treatment options for his knee.  He does have osteoarthritis in the lateral compartment and patellofemoral compartments as well.  More likely than not, a lot of his pain is coming from this complex meniscus tear.  Risks and benefits of proceeding with right knee arthroscopy with partial meniscectomy were discussed with him today.  All of his questions were answered.  He clearly understood and wished to proceed.  Surgical consents were obtained today.      Risks of the procedure were reviewed with the patient.  This includes,  but is not limited to: infection, bleeding, pain, swelling, decreased range of motion, nerve injury/damage, deep vein thrombosis, pulmonary embolism, wound dehiscence, and possible need for further surgery.        BETH Peoples, PAAureliaC    This note was created using [employer] voice recognition software that occasionally misinterprets words or phrases.

## 2022-12-01 NOTE — PROGRESS NOTES
[facility] ORTHOPED[facility]  [address]  [place] [address]  Phone: [phone]   [phone]    Patient's PCP: Eric Mercado NP  Referring Provider: No ref. provider found    Subjective:      Chief Complaint:   Chief Complaint   Patient presents with    Right Knee - Pain     Here for MRI Right Knee results,        Past Medical History:   Diagnosis Date    CAD (coronary artery disease) 01/13/2017    Cervical spinal stenosis     Hyperlipidemia     Hypertension     Migraine headache     TIA (transient ischemic attack)        Past Surgical History:   Procedure Laterality Date    APPENDECTOMY      CERVICAL FUSION  2009    CROSS FINGER FLAP      FRACTURE SURGERY      steel librado in right leg    KNEE ARTHROSCOPY Right     ORTHOPEDIC SURGERY Right     librado from knee to ankle    RHIZOTOMY  08/2016    SPINE SURGERY         Current Outpatient Medications   Medication Sig    coQ10, ubiquinol, 100 mg Cap Take 1 capsule by mouth once daily.    losartan-hydrochlorothiazide 50-12.5 mg (HYZAAR) 50-12.5 mg per tablet Take 1 tablet by mouth once daily.    metoprolol succinate (TOPROL-XL) 25 MG 24 hr tablet Take 25 mg by mouth once daily.    omeprazole (PRILOSEC) 40 MG capsule Take 1 capsule (40 mg total) by mouth once daily.    rosuvastatin (CRESTOR) 10 MG tablet TAKE 1 TABLET(10 MG) BY MOUTH EVERY EVENING    apixaban (ELIQUIS) 5 mg Tab Take 5 mg by mouth 2 (two) times daily.     No current facility-administered medications for this visit.       Review of patient's allergies indicates:  No Known Allergies    Family History   Problem Relation Age of Onset    Hypertension Mother     No Known Problems Sister     No Known Problems Brother        Social History     Socioeconomic History    Marital status:    Tobacco Use    Smoking status: Never    Smokeless tobacco: Never   Substance and Sexual Activity    Alcohol use: No    Drug use: No    Sexual activity: Yes     Partners: Female     Social Determinants of Health      Financial Resource Strain: Low Risk     Difficulty of Paying Living Expenses: Not very hard   Food Insecurity: No Food Insecurity    Worried About Running Out of Food in the Last Year: Never true    Ran Out of Food in the Last Year: Never true   Transportation Needs: No Transportation Needs    Lack of Transportation (Medical): No    Lack of Transportation (Non-Medical): No   Physical Activity: Unknown    Days of Exercise per Week: 0 days   Social Connections: Unknown    Frequency of Communication with Friends and Family: More than three times a week    Frequency of Social Gatherings with Friends and Family: Once a week    Active Member of Clubs or Organizations: No    Marital Status:    Housing Stability: Low Risk     Unable to Pay for Housing in the Last Year: No    Number of Places Lived in the Last Year: 1    Unstable Housing in the Last Year: No       History of present illness:  Otis comes in today for follow-up for his right knee.  He has had his MRI.  He is here today with those results.    Review of Systems:    Constitutional: Negative for chills, fever and weight loss.   HENT: Negative for congestion.    Eyes: Negative for discharge and redness.   Respiratory: Negative for cough and shortness of breath.    Cardiovascular: Negative for chest pain.   Gastrointestinal: Negative for nausea and vomiting.   Musculoskeletal: See HPI.   Skin: Negative for rash.   Neurological: Negative for headaches.   Endo/Heme/Allergies: Does not bruise/bleed easily.   Psychiatric/Behavioral: The patient is not nervous/anxious.    All other systems reviewed and are negative.       Objective:      Physical Examination:    Vital Signs:  There were no vitals filed for this visit.    Body mass index is 42.37 kg/m².    This a well-developed, well nourished patient in no acute distress.  They are alert and oriented and cooperative to examination.     Right knee exam:  Skin to the right knee is clean dry and intact.  There  is no erythema or ecchymosis.  There are no signs or symptoms of infection.  Patient is neurovascularly intact throughout the right lower extremity.  Right calf is soft and nontender.  Right knee range of motion is approximately 0-120 degrees.  His right knee is stable to varus and valgus stresses while held in extension.  He does have some fairly diffuse tenderness to palpation of the right knee on the lateral aspect.  He can weightbear as tolerated on his right lower extremity.  He has a negative straight leg raise on the right.    Pertinent New Results:        XRAY Report / Interpretation:   No new radiographs were taken on today's clinic visit.  However did review images report of his right knee MRI which was significant for complex tearing of the body, posterior horn, and anterior horn of the lateral meniscus.  He does have osteoarthritic changes in the lateral compartment as well as a lateral patella facet with subchondral cystic changes.      Assessment:       1. History of arthroscopy of right knee    2. Post-traumatic osteoarthritis of right knee    3. Other old tear of lateral meniscus of right knee      Plan:     History of arthroscopy of right knee    Post-traumatic osteoarthritis of right knee    Other old tear of lateral meniscus of right knee      No follow-ups on file.    Patient has a right knee lateral meniscus tear with osteoarthritis.  Long discussion with him today about the treatment options for his knee.  He does have osteoarthritis in the lateral compartment and patellofemoral compartments as well.  More likely than not, a lot of his pain is coming from this complex meniscus tear.  Risks and benefits of proceeding with right knee arthroscopy with partial meniscectomy were discussed with him today.  All of his questions were answered.  He clearly understood and wished to proceed.  Surgical consents were obtained today.      Risks of the procedure were reviewed with the patient.  This includes,  but is not limited to: infection, bleeding, pain, swelling, decreased range of motion, nerve injury/damage, deep vein thrombosis, pulmonary embolism, wound dehiscence, and possible need for further surgery.        BETH Peoples, PAAureliaC    This note was created using [employer] voice recognition software that occasionally misinterprets words or phrases.

## 2022-12-05 ENCOUNTER — HOSPITAL ENCOUNTER (OUTPATIENT)
Dept: PREADMISSION TESTING | Facility: HOSPITAL | Age: 62
Discharge: HOME | End: 2022-12-05
Attending: ORTHOPAEDIC SURGERY
Payer: MEDICAID

## 2022-12-05 ENCOUNTER — HOSPITAL ENCOUNTER (OUTPATIENT)
Dept: RADIOLOGY | Facility: HOSPITAL | Age: 62
Discharge: HOME | End: 2022-12-05
Attending: ORTHOPAEDIC SURGERY
Payer: MEDICAID

## 2022-12-05 VITALS
HEIGHT: 74 IN | HEART RATE: 84 BPM | SYSTOLIC BLOOD PRESSURE: 148 MMHG | OXYGEN SATURATION: 99 % | RESPIRATION RATE: 16 BRPM | WEIGHT: 209.44 LBS | DIASTOLIC BLOOD PRESSURE: 84 MMHG | BODY MASS INDEX: 26.88 KG/M2 | TEMPERATURE: 98 F

## 2022-12-05 DIAGNOSIS — M23.200 OTHER OLD TEAR OF LATERAL MENISCUS OF RIGHT KNEE: ICD-10-CM

## 2022-12-05 DIAGNOSIS — M17.31 POST-TRAUMATIC OSTEOARTHRITIS OF RIGHT KNEE: ICD-10-CM

## 2022-12-05 DIAGNOSIS — Z01.818 PRE-OP TESTING: ICD-10-CM

## 2022-12-05 DIAGNOSIS — Z01.818 PRE-OP TESTING: Primary | ICD-10-CM

## 2022-12-05 PROCEDURE — 93005 ELECTROCARDIOGRAM TRACING: CPT | Performed by: INTERNAL MEDICINE

## 2022-12-05 PROCEDURE — 93010 EKG 12-LEAD: ICD-10-PCS | Mod: ,,, | Performed by: INTERNAL MEDICINE

## 2022-12-05 PROCEDURE — 71046 X-RAY EXAM CHEST 2 VIEWS: CPT | Mod: TC

## 2022-12-05 PROCEDURE — 93010 ELECTROCARDIOGRAM REPORT: CPT | Mod: ,,, | Performed by: INTERNAL MEDICINE

## 2022-12-06 ENCOUNTER — TELEPHONE (OUTPATIENT)
Dept: FAMILY MEDICINE | Facility: CLINIC | Age: 62
End: 2022-12-06

## 2022-12-06 NOTE — TELEPHONE ENCOUNTER
----- Message from Eric Mercado NP sent at 12/6/2022 12:20 PM CST -----  A1c 5.8, decrease sugars/carbs in the diet. All other labs ok

## 2022-12-07 ENCOUNTER — PATIENT MESSAGE (OUTPATIENT)
Dept: ORTHOPEDICS | Facility: CLINIC | Age: 62
End: 2022-12-07

## 2022-12-12 DIAGNOSIS — M23.200 OTHER OLD TEAR OF LATERAL MENISCUS OF RIGHT KNEE: Primary | ICD-10-CM

## 2022-12-12 RX ORDER — OXYCODONE AND ACETAMINOPHEN 7.5; 325 MG/1; MG/1
1 TABLET ORAL EVERY 6 HOURS PRN
Qty: 28 TABLET | Refills: 0 | Status: SHIPPED | OUTPATIENT
Start: 2022-12-12 | End: 2022-12-20 | Stop reason: SDUPTHER

## 2022-12-14 ENCOUNTER — HOSPITAL ENCOUNTER (OUTPATIENT)
Facility: HOSPITAL | Age: 62
Discharge: HOME | End: 2022-12-14
Attending: ORTHOPAEDIC SURGERY | Admitting: ORTHOPAEDIC SURGERY
Payer: MEDICAID

## 2022-12-14 ENCOUNTER — ANESTHESIA (OUTPATIENT)
Dept: SURGERY | Facility: HOSPITAL | Age: 62
End: 2022-12-14
Payer: MEDICAID

## 2022-12-14 ENCOUNTER — ANESTHESIA EVENT (OUTPATIENT)
Dept: SURGERY | Facility: HOSPITAL | Age: 62
End: 2022-12-14
Payer: MEDICAID

## 2022-12-14 VITALS
HEIGHT: 74 IN | TEMPERATURE: 98 F | OXYGEN SATURATION: 96 % | SYSTOLIC BLOOD PRESSURE: 131 MMHG | DIASTOLIC BLOOD PRESSURE: 87 MMHG | BODY MASS INDEX: 26.82 KG/M2 | RESPIRATION RATE: 12 BRPM | WEIGHT: 209 LBS | HEART RATE: 59 BPM

## 2022-12-14 DIAGNOSIS — M17.31 POST-TRAUMATIC OSTEOARTHRITIS OF ONE KNEE, RIGHT: Primary | ICD-10-CM

## 2022-12-14 DIAGNOSIS — M17.31 POST-TRAUMATIC OSTEOARTHRITIS OF RIGHT KNEE: ICD-10-CM

## 2022-12-14 DIAGNOSIS — M23.200 OTHER OLD TEAR OF LATERAL MENISCUS OF RIGHT KNEE: ICD-10-CM

## 2022-12-14 PROCEDURE — 71000039 HC RECOVERY, EACH ADD'L HOUR: Performed by: ORTHOPAEDIC SURGERY

## 2022-12-14 PROCEDURE — 37000008 HC ANESTHESIA 1ST 15 MINUTES: Performed by: ORTHOPAEDIC SURGERY

## 2022-12-14 PROCEDURE — 36000711: Performed by: ORTHOPAEDIC SURGERY

## 2022-12-14 PROCEDURE — 63600175 PHARM REV CODE 636 W HCPCS: Performed by: ANESTHESIOLOGY

## 2022-12-14 PROCEDURE — 71000033 HC RECOVERY, INTIAL HOUR: Performed by: ORTHOPAEDIC SURGERY

## 2022-12-14 PROCEDURE — 25000003 PHARM REV CODE 250: Performed by: ANESTHESIOLOGY

## 2022-12-14 PROCEDURE — 37000009 HC ANESTHESIA EA ADD 15 MINS: Performed by: ORTHOPAEDIC SURGERY

## 2022-12-14 PROCEDURE — 36000710: Performed by: ORTHOPAEDIC SURGERY

## 2022-12-14 PROCEDURE — 63600175 PHARM REV CODE 636 W HCPCS: Performed by: ORTHOPAEDIC SURGERY

## 2022-12-14 PROCEDURE — 63600175 PHARM REV CODE 636 W HCPCS: Performed by: NURSE ANESTHETIST, CERTIFIED REGISTERED

## 2022-12-14 PROCEDURE — 27201423 OPTIME MED/SURG SUP & DEVICES STERILE SUPPLY: Performed by: ORTHOPAEDIC SURGERY

## 2022-12-14 PROCEDURE — 01400 ANES OPN/ARTHRS KNEE JT NOS: CPT | Performed by: ORTHOPAEDIC SURGERY

## 2022-12-14 PROCEDURE — 71000015 HC POSTOP RECOV 1ST HR: Performed by: ORTHOPAEDIC SURGERY

## 2022-12-14 PROCEDURE — 25000003 PHARM REV CODE 250: Performed by: ORTHOPAEDIC SURGERY

## 2022-12-14 PROCEDURE — 25000003 PHARM REV CODE 250: Performed by: NURSE ANESTHETIST, CERTIFIED REGISTERED

## 2022-12-14 RX ORDER — DIPHENHYDRAMINE HYDROCHLORIDE 50 MG/ML
12.5 INJECTION INTRAMUSCULAR; INTRAVENOUS
Status: DISCONTINUED | OUTPATIENT
Start: 2022-12-14 | End: 2022-12-14 | Stop reason: HOSPADM

## 2022-12-14 RX ORDER — ONDANSETRON 2 MG/ML
INJECTION INTRAMUSCULAR; INTRAVENOUS
Status: DISCONTINUED | OUTPATIENT
Start: 2022-12-14 | End: 2022-12-14

## 2022-12-14 RX ORDER — HYDROMORPHONE HYDROCHLORIDE 1 MG/ML
0.2 INJECTION, SOLUTION INTRAMUSCULAR; INTRAVENOUS; SUBCUTANEOUS EVERY 5 MIN PRN
Status: DISCONTINUED | OUTPATIENT
Start: 2022-12-14 | End: 2022-12-14 | Stop reason: HOSPADM

## 2022-12-14 RX ORDER — PROPOFOL 10 MG/ML
INJECTION, EMULSION INTRAVENOUS
Status: DISCONTINUED | OUTPATIENT
Start: 2022-12-14 | End: 2022-12-14

## 2022-12-14 RX ORDER — HYDROCODONE BITARTRATE AND ACETAMINOPHEN 5; 325 MG/1; MG/1
1 TABLET ORAL EVERY 4 HOURS PRN
Status: CANCELLED | OUTPATIENT
Start: 2022-12-14

## 2022-12-14 RX ORDER — MUPIROCIN 20 MG/G
1 OINTMENT TOPICAL 2 TIMES DAILY
Status: CANCELLED | OUTPATIENT
Start: 2022-12-14 | End: 2022-12-19

## 2022-12-14 RX ORDER — ACETAMINOPHEN 10 MG/ML
INJECTION, SOLUTION INTRAVENOUS
Status: DISCONTINUED | OUTPATIENT
Start: 2022-12-14 | End: 2022-12-14

## 2022-12-14 RX ORDER — METHYLPREDNISOLONE ACETATE 80 MG/ML
INJECTION, SUSPENSION INTRA-ARTICULAR; INTRALESIONAL; INTRAMUSCULAR; SOFT TISSUE
Status: DISCONTINUED | OUTPATIENT
Start: 2022-12-14 | End: 2022-12-14 | Stop reason: HOSPADM

## 2022-12-14 RX ORDER — LIDOCAINE HCL/PF 100 MG/5ML
SYRINGE (ML) INTRAVENOUS
Status: DISCONTINUED | OUTPATIENT
Start: 2022-12-14 | End: 2022-12-14

## 2022-12-14 RX ORDER — OXYCODONE HYDROCHLORIDE 5 MG/1
5 TABLET ORAL
Status: DISCONTINUED | OUTPATIENT
Start: 2022-12-14 | End: 2022-12-14 | Stop reason: HOSPADM

## 2022-12-14 RX ORDER — FAMOTIDINE 10 MG/ML
INJECTION INTRAVENOUS
Status: DISCONTINUED | OUTPATIENT
Start: 2022-12-14 | End: 2022-12-14

## 2022-12-14 RX ORDER — CEFAZOLIN SODIUM 2 G/50ML
2 SOLUTION INTRAVENOUS
Status: COMPLETED | OUTPATIENT
Start: 2022-12-14 | End: 2022-12-14

## 2022-12-14 RX ORDER — ONDANSETRON 2 MG/ML
4 INJECTION INTRAMUSCULAR; INTRAVENOUS DAILY PRN
Status: DISCONTINUED | OUTPATIENT
Start: 2022-12-14 | End: 2022-12-14 | Stop reason: HOSPADM

## 2022-12-14 RX ORDER — FENTANYL CITRATE 50 UG/ML
INJECTION, SOLUTION INTRAMUSCULAR; INTRAVENOUS
Status: DISCONTINUED | OUTPATIENT
Start: 2022-12-14 | End: 2022-12-14

## 2022-12-14 RX ORDER — DEXAMETHASONE SODIUM PHOSPHATE 4 MG/ML
INJECTION, SOLUTION INTRA-ARTICULAR; INTRALESIONAL; INTRAMUSCULAR; INTRAVENOUS; SOFT TISSUE
Status: DISCONTINUED | OUTPATIENT
Start: 2022-12-14 | End: 2022-12-14

## 2022-12-14 RX ORDER — MIDAZOLAM HYDROCHLORIDE 1 MG/ML
INJECTION INTRAMUSCULAR; INTRAVENOUS
Status: DISCONTINUED | OUTPATIENT
Start: 2022-12-14 | End: 2022-12-14

## 2022-12-14 RX ORDER — BUPIVACAINE HYDROCHLORIDE AND EPINEPHRINE 5; 5 MG/ML; UG/ML
INJECTION, SOLUTION EPIDURAL; INTRACAUDAL; PERINEURAL
Status: DISCONTINUED | OUTPATIENT
Start: 2022-12-14 | End: 2022-12-14 | Stop reason: HOSPADM

## 2022-12-14 RX ADMIN — HYDROMORPHONE HYDROCHLORIDE 0.2 MG: 1 INJECTION, SOLUTION INTRAMUSCULAR; INTRAVENOUS; SUBCUTANEOUS at 01:12

## 2022-12-14 RX ADMIN — LIDOCAINE HYDROCHLORIDE 100 MG: 20 INJECTION, SOLUTION INTRAVENOUS at 12:12

## 2022-12-14 RX ADMIN — FAMOTIDINE 20 MG: 10 INJECTION, SOLUTION INTRAVENOUS at 12:12

## 2022-12-14 RX ADMIN — ONDANSETRON 4 MG: 2 INJECTION INTRAMUSCULAR; INTRAVENOUS at 12:12

## 2022-12-14 RX ADMIN — OXYCODONE HYDROCHLORIDE 5 MG: 5 TABLET ORAL at 01:12

## 2022-12-14 RX ADMIN — FENTANYL CITRATE 50 MCG: 50 INJECTION INTRAMUSCULAR; INTRAVENOUS at 12:12

## 2022-12-14 RX ADMIN — PROPOFOL 300 MG: 10 INJECTION, EMULSION INTRAVENOUS at 12:12

## 2022-12-14 RX ADMIN — DEXAMETHASONE SODIUM PHOSPHATE 8 MG: 4 INJECTION, SOLUTION INTRAMUSCULAR; INTRAVENOUS at 12:12

## 2022-12-14 RX ADMIN — CEFAZOLIN SODIUM 2 G: 2 SOLUTION INTRAVENOUS at 12:12

## 2022-12-14 RX ADMIN — SODIUM CHLORIDE, SODIUM LACTATE, POTASSIUM CHLORIDE, AND CALCIUM CHLORIDE: .6; .31; .03; .02 INJECTION, SOLUTION INTRAVENOUS at 12:12

## 2022-12-14 RX ADMIN — ACETAMINOPHEN 1000 MG: 10 INJECTION, SOLUTION INTRAVENOUS at 12:12

## 2022-12-14 RX ADMIN — MIDAZOLAM HYDROCHLORIDE 2 MG: 1 INJECTION, SOLUTION INTRAMUSCULAR; INTRAVENOUS at 12:12

## 2022-12-14 NOTE — TRANSFER OF CARE
"Anesthesia Transfer of Care Note    Patient: Otis Colón    Procedure(s) Performed: Procedure(s) (LRB):  ARTHROSCOPY, KNEE, RIGHT (Right)    Patient location: PACU    Anesthesia Type: general    Transport from OR: Transported from OR on 2-3 L/min O2 by NC with adequate spontaneous ventilation    Post pain: adequate analgesia    Post assessment: no apparent anesthetic complications    Post vital signs: stable    Level of consciousness: awake and alert    Nausea/Vomiting: no nausea/vomiting    Complications: none    Transfer of care protocol was followed      Last vitals:   Visit Vitals  BP (!) 141/88   Pulse 68   Temp 36.8 °C (98.3 °F) (Oral)   Resp 17   Ht 6' 2" (1.88 m)   Wt 94.8 kg (209 lb)   SpO2 97%   BMI 26.83 kg/m²     "

## 2022-12-14 NOTE — ANESTHESIA PREPROCEDURE EVALUATION
12/14/2022  Otis Colón is a 62 y.o., male.    Patient Active Problem List   Diagnosis    Angina pectoris    Stenosis, cervical spine    Obesity, morbid, BMI 40.0-49.9    Essential hypertension    CAD (coronary artery disease)    BLANKA (obstructive sleep apnea)    Transient ischemic attack    Decreased range of motion of neck    Abnormal posture       Past Surgical History:   Procedure Laterality Date    APPENDECTOMY      CERVICAL FUSION  2009    CROSS FINGER FLAP      FRACTURE SURGERY      steel librado in right leg    KNEE ARTHROSCOPY Right     ORTHOPEDIC SURGERY Right     librado from knee to ankle    RHIZOTOMY  08/2016    SPINE SURGERY          Tobacco Use:  The patient  reports that he has never smoked. He has never used smokeless tobacco.     Results for orders placed or performed during the hospital encounter of 12/05/22   EKG 12-lead    Collection Time: 12/05/22  1:25 PM    Narrative    Test Reason : Z01.818,Z01.818,    Vent. Rate : 059 BPM     Atrial Rate : 059 BPM     P-R Int : 200 ms          QRS Dur : 092 ms      QT Int : 422 ms       P-R-T Axes : 042 014 025 degrees     QTc Int : 417 ms    Sinus bradycardia  Low voltage QRS  Borderline Abnormal ECG  When compared with ECG of 21-OCT-2020 10:21,  No significant change was found  Confirmed by Dwight Knapp MD ([id number]) on 12/7/2022 2:36:11 PM    Referred By:             Confirmed By:Dwight Knapp MD             Lab Results   Component Value Date    WBC 6.26 12/05/2022    HGB 15.0 12/05/2022    HCT 42.7 12/05/2022    MCV 88 12/05/2022     12/05/2022     BMP  Lab Results   Component Value Date     (L) 12/05/2022    K 3.9 12/05/2022     12/05/2022    CO2 22 (L) 12/05/2022    BUN 13 12/05/2022    CREATININE 1.2 12/05/2022    CALCIUM 9.0 12/05/2022    ANIONGAP 10 12/05/2022    GLU 98 12/05/2022     04/21/2022      (H) 03/11/2022       Results for orders placed during the hospital encounter of 10/21/20    Echo Color Flow Doppler? Yes; Bubble Contrast? Yes    Interpretation Summary  · The left ventricle is normal in size with hyperdynamic systolic function. The estimated ejection fraction is 75%.  · Grade I diastolic dysfunction.  · There is mild left ventricular concentric hypertrophy.  · There is no evidence of intracardiac shunting.  · Normal right ventricular systolic function.  · Mild left atrial enlargement.  · Mild right ventricular enlargement.  · Normal central venous pressure (3 mmHg).            Pre-op Assessment    I have reviewed the Patient Summary Reports.     I have reviewed the Nursing Notes. I have reviewed the NPO Status.   I have reviewed the Medications.     Review of Systems  Anesthesia Hx:  No problems with previous Anesthesia  Denies Family Hx of Anesthesia complications.   Denies Personal Hx of Anesthesia complications.   Social:  Non-Smoker    Hematology/Oncology:  Hematology Normal        EENT/Dental:EENT/Dental Normal   Cardiovascular:   Hypertension CAD (follow by Dr. Thibodeaux) asymptomatic Dysrhythmias (h/o atrial fibrillation.  On eliquis.  Currently in sinus) atrial fibrillation    Pulmonary:   Sleep Apnea No machine.   Education provided regarding risk of obstructive sleep apnea     Renal/:  Renal/ Normal     Hepatic/GI:  Hepatic/GI Normal    Musculoskeletal:  Musculoskeletal Normal  Cervical Spine Disorder (cervical spinal stenosis)    Neurological:   TIA, (no residual problems) Headaches    Endocrine:  Endocrine Normal    Psych:  Psychiatric Normal           Physical Exam  General: Well nourished    Airway:  Mallampati: II / I  Mouth Opening: Normal  TM Distance: Normal  Tongue: Normal  Neck ROM: Normal ROM    Dental:  Intact    Chest/Lungs:  Clear to auscultation, Normal Respiratory Rate    Heart:  Rate: Normal  Rhythm: Regular Rhythm        Anesthesia Plan  Type of Anesthesia,  risks & benefits discussed:    Anesthesia Type: Gen ETT  Intra-op Monitoring Plan: Standard ASA Monitors  Post Op Pain Control Plan: IV/PO Opioids PRN and multimodal analgesia  Induction:  IV  Airway Plan: Video  Informed Consent: Informed consent signed with the Patient and all parties understand the risks and agree with anesthesia plan.  All questions answered.   ASA Score: 3  Anesthesia Plan Notes: Sleep apnea precautions, give reduce fentanyl (25-50 mcg), awake extubation, and sitting up positioning.  Multimodal analgesia with ofirmev 1000mg, decadron 8 mg, ketamine 50 mg.  PONV prophylaxis with Pepcid 20 mg IV, and Zofran 4 mg IV.      Ready For Surgery From Anesthesia Perspective.     .

## 2022-12-14 NOTE — ANESTHESIA POSTPROCEDURE EVALUATION
Anesthesia Post Evaluation    Patient: Otis Colón    Procedure(s) Performed: Procedure(s) (LRB):  ARTHROSCOPY, KNEE, RIGHT (Right)    Final Anesthesia Type: general      Patient location during evaluation: PACU  Patient participation: Yes- Able to Participate  Level of consciousness: awake and alert, oriented and awake  Post-procedure vital signs: reviewed and stable  Pain management: adequate  Airway patency: patent  BLANKA mitigation strategies: Multimodal analgesia, Extubation while patient is awake and Extubation and recovery carried out in lateral, semiupright, or other nonsupine position  PONV status at discharge: No PONV  Anesthetic complications: no      Cardiovascular status: blood pressure returned to baseline, hemodynamically stable and stable  Respiratory status: unassisted, spontaneous ventilation and room air  Hydration status: euvolemic  Follow-up not needed.          Vitals Value Taken Time   /77 12/14/22 1345   Temp 36.3 °C (97.4 °F) 12/14/22 1345   Pulse 54 12/14/22 1354   Resp 17 12/14/22 1354   SpO2 97 % 12/14/22 1354   Vitals shown include unvalidated device data.      No case tracking events are documented in the log.      Pain/Brendan Score: Pain Rating Prior to Med Admin: 6 (12/14/2022  1:30 PM)  Brendan Score: 10 (12/14/2022  1:45 PM)

## 2022-12-14 NOTE — OP NOTE
[facility]  Surgery Department  Operative Note    SUMMARY     Date of Procedure: 12/14/2022     Procedure:  Arthroscopic partial lateral meniscectomy    Surgeon(s) and Role:     * Jeovany Marie MD - Primary    Assisting Surgeon:  Teressa    Pre-Operative Diagnosis: Post-traumatic osteoarthritis of right knee [M17.31]  Other old tear of lateral meniscus of right knee [M23.200]    Post-Operative Diagnosis: Post-Op Diagnosis Codes:     * Post-traumatic osteoarthritis of right knee [M17.31]     * Other old tear of lateral meniscus of right knee [M23.200]    Anesthesia: General    Intraoperative Findings:  The patellofemoral joint was noted to have grade 4 chondromalacia throughout the patella and grade 3 throughout the trochlea.  The ACL and PCL were intact.  The medial compartment was essentially pristine with an intact meniscus.  The lateral compartment demonstrated a large shredded meniscal flap which was a parrot-beak flap off the posterior horn.  There was some grade 3 and some grade 4 chondromalacia on the tibia and the femur.    Description of the Findings of the Procedure:  Patient was placed on the operating table supine position.  The knee was prepped and draped in the usual sterile manner for surgery.  Inferomedial and inferolateral portals were established and diagnostic arthroscopy was undertaken.  The findings of those stated above.  At this point I turned my attention to the patellofemoral joint.  I used the shaver and debrided gently debrided the undersurface of the patella which had a lot of fragmented hanging cartilage.  I now debrided the trochlea.  I turned my attention now to the medial compartment.  There series of Zulema and biters were utilized and 60% of the posterior horn of the lateral meniscus was excised.  I now removed the arthroscopic equipment and the portals were closed with nylon stitches.  Sterile dressings were applied and the patient was noted to be in stable  condition    Complications: No    Estimated Blood Loss (EBL): * No values recorded between 12/14/2022 12:34 PM and 12/14/2022 12:43 PM *           Implants: * No implants in log *    Specimens:   Specimen (24h ago, onward)      None                    Condition: Good    Disposition: PACU - hemodynamically stable.                Discharge Note    SUMMARY     Admit Date: 12/14/2022    Discharge Date and Time:  12/14/2022 12:43 PM    Hospital Course (synopsis of major diagnoses, care, treatment, and services provided during the course of the hospital stay): Uneventful      Final Diagnosis: Post-Op Diagnosis Codes:     * Post-traumatic osteoarthritis of right knee [M17.31]     * Other old tear of lateral meniscus of right knee [M23.200]    Disposition: Home or Self Care    Follow Up/Patient Instructions:     Medications:  Reconciled Home Medications:   Current Discharge Medication List        CONTINUE these medications which have NOT CHANGED    Details   losartan-hydrochlorothiazide 50-12.5 mg (HYZAAR) 50-12.5 mg per tablet Take 1 tablet by mouth once daily.  Qty: 90 tablet, Refills: 1    Comments: .  Associated Diagnoses: Essential hypertension      metoprolol succinate (TOPROL-XL) 25 MG 24 hr tablet Take 25 mg by mouth once daily.      omeprazole (PRILOSEC) 40 MG capsule Take 1 capsule (40 mg total) by mouth once daily.  Qty: 30 capsule, Refills: 1    Associated Diagnoses: Gastroesophageal reflux disease, unspecified whether esophagitis present      rosuvastatin (CRESTOR) 10 MG tablet TAKE 1 TABLET(10 MG) BY MOUTH EVERY EVENING  Qty: 90 tablet, Refills: 1    Associated Diagnoses: Pure hypercholesterolemia      oxyCODONE-acetaminophen (PERCOCET) 7.5-325 mg per tablet Take 1 tablet by mouth every 6 (six) hours as needed for Pain.  Qty: 28 tablet, Refills: 0    Comments: This prescription is for postoperative use.  Patient has a scheduled procedure on Wednesday December 14, 2022.  Associated Diagnoses: Other old tear of  lateral meniscus of right knee           Discharge Procedure Orders   Diet general     Activity as tolerated     Sponge bath only until clinic visit     Ice to affected area     Weight bearing restrictions (specify)     Remove dressing in 24 hours     Call MD for:  temperature >100.4     Call MD for:  persistent nausea and vomiting     Call MD for:  severe uncontrolled pain     Call MD for:  difficulty breathing, headache or visual disturbances     Call MD for:  redness, tenderness, or signs of infection (pain, swelling, redness, odor or green/yellow discharge around incision site)     Call MD for:  hives     Call MD for:  persistent dizziness or light-headedness     Call MD for:  extreme fatigue     Shower on day dressing removed (No bath)

## 2022-12-23 ENCOUNTER — OFFICE VISIT (OUTPATIENT)
Dept: ORTHOPEDICS | Facility: CLINIC | Age: 62
End: 2022-12-23
Payer: MEDICAID

## 2022-12-23 VITALS — BODY MASS INDEX: 26.82 KG/M2 | HEIGHT: 74 IN | WEIGHT: 209 LBS

## 2022-12-23 DIAGNOSIS — Z98.890 S/P RIGHT KNEE ARTHROSCOPY: Primary | ICD-10-CM

## 2022-12-23 PROCEDURE — 1159F PR MEDICATION LIST DOCUMENTED IN MEDICAL RECORD: ICD-10-PCS | Mod: CPTII,S$GLB,, | Performed by: PHYSICIAN ASSISTANT

## 2022-12-23 PROCEDURE — 99024 PR POST-OP FOLLOW-UP VISIT: ICD-10-PCS | Mod: S$GLB,,, | Performed by: PHYSICIAN ASSISTANT

## 2022-12-23 PROCEDURE — 3008F BODY MASS INDEX DOCD: CPT | Mod: CPTII,S$GLB,, | Performed by: PHYSICIAN ASSISTANT

## 2022-12-23 PROCEDURE — 1160F RVW MEDS BY RX/DR IN RCRD: CPT | Mod: CPTII,S$GLB,, | Performed by: PHYSICIAN ASSISTANT

## 2022-12-23 PROCEDURE — 3044F PR MOST RECENT HEMOGLOBIN A1C LEVEL <7.0%: ICD-10-PCS | Mod: CPTII,S$GLB,, | Performed by: PHYSICIAN ASSISTANT

## 2022-12-23 PROCEDURE — 3008F PR BODY MASS INDEX (BMI) DOCUMENTED: ICD-10-PCS | Mod: CPTII,S$GLB,, | Performed by: PHYSICIAN ASSISTANT

## 2022-12-23 PROCEDURE — 3044F HG A1C LEVEL LT 7.0%: CPT | Mod: CPTII,S$GLB,, | Performed by: PHYSICIAN ASSISTANT

## 2022-12-23 PROCEDURE — 1160F PR REVIEW ALL MEDS BY PRESCRIBER/CLIN PHARMACIST DOCUMENTED: ICD-10-PCS | Mod: CPTII,S$GLB,, | Performed by: PHYSICIAN ASSISTANT

## 2022-12-23 PROCEDURE — 99024 POSTOP FOLLOW-UP VISIT: CPT | Mod: S$GLB,,, | Performed by: PHYSICIAN ASSISTANT

## 2022-12-23 PROCEDURE — 1159F MED LIST DOCD IN RCRD: CPT | Mod: CPTII,S$GLB,, | Performed by: PHYSICIAN ASSISTANT

## 2022-12-23 RX ORDER — APIXABAN 5 MG/1
5 TABLET, FILM COATED ORAL 2 TIMES DAILY
COMMUNITY
Start: 2022-12-08 | End: 2023-09-14

## 2022-12-23 NOTE — PROGRESS NOTES
[facility]  [address]  [place] [address]  Phone: [phone]   [phone]    Patient's PCP:Eric Mercado NP  Referring Provider: No ref. provider found    POST-OP Note:    Subjective:        Chief Complaint:   Chief Complaint   Patient presents with    Right Knee - Post-op Evaluation     PO Right knee scope 12/14/22. Here for suture removal. States that he is still having some pain       Past Medical History:   Diagnosis Date    Arthritis     Back pain     CAD (coronary artery disease) 01/13/2017    Cervical spinal stenosis     Digestive disorder     Hyperlipidemia     Hypertension     Migraine headache     Neck pain     TIA (transient ischemic attack)        Past Surgical History:   Procedure Laterality Date    APPENDECTOMY      CERVICAL FUSION  2009    CROSS FINGER FLAP      FRACTURE SURGERY      steel librado in right leg    KNEE ARTHROSCOPY Right     KNEE ARTHROSCOPY W/ MENISCECTOMY Right 12/14/2022    Procedure: ARTHROSCOPY, KNEE, WITH MENISCECTOMY;  Surgeon: Jeovany Marie MD;  Location: [facility];  Service: Orthopedics;  Laterality: Right;    ORTHOPEDIC SURGERY Right     librado from knee to ankle    RHIZOTOMY  08/2016    SPINE SURGERY         Current Outpatient Medications   Medication Sig    losartan-hydrochlorothiazide 50-12.5 mg (HYZAAR) 50-12.5 mg per tablet Take 1 tablet by mouth once daily.    metoprolol succinate (TOPROL-XL) 25 MG 24 hr tablet Take 25 mg by mouth once daily.    omeprazole (PRILOSEC) 40 MG capsule Take 1 capsule (40 mg total) by mouth once daily.    oxyCODONE-acetaminophen (PERCOCET) 7.5-325 mg per tablet Take 1 tablet by mouth every 8 (eight) hours as needed for Pain.    rosuvastatin (CRESTOR) 10 MG tablet TAKE 1 TABLET(10 MG) BY MOUTH EVERY EVENING    ELIQUIS 5 mg Tab Take 5 mg by mouth 2 (two) times daily.     No current facility-administered medications for this visit.       Review of patient's allergies indicates:  No Known Allergies    Family History    Problem Relation Age of Onset    Hypertension Mother     No Known Problems Sister     No Known Problems Brother        Social History     Socioeconomic History    Marital status:    Tobacco Use    Smoking status: Never    Smokeless tobacco: Never   Substance and Sexual Activity    Alcohol use: No    Drug use: No    Sexual activity: Yes     Partners: Female     Social Determinants of Health     Financial Resource Strain: Low Risk     Difficulty of Paying Living Expenses: Not very hard   Food Insecurity: No Food Insecurity    Worried About Running Out of Food in the Last Year: Never true    Ran Out of Food in the Last Year: Never true   Transportation Needs: No Transportation Needs    Lack of Transportation (Medical): No    Lack of Transportation (Non-Medical): No   Physical Activity: Unknown    Days of Exercise per Week: 0 days   Social Connections: Unknown    Frequency of Communication with Friends and Family: More than three times a week    Frequency of Social Gatherings with Friends and Family: Once a week    Active Member of Clubs or Organizations: No    Marital Status:    Housing Stability: Low Risk     Unable to Pay for Housing in the Last Year: No    Number of Places Lived in the Last Year: 1    Unstable Housing in the Last Year: No       History of present illness:  62-year-old male status post right knee arthroscopy done December 14th.  Here for postop visit.  Extensive grade 3/4 chondromalacia of the patellofemoral and lateral compartments with a partial lateral meniscectomy.    Date of Procedure: 12/14/2022      Procedure:  Arthroscopic partial lateral meniscectomy     Surgeon(s) and Role:     * Jeovany Marie MD - Primary     Assisting Surgeon:  Teressa     Pre-Operative Diagnosis: Post-traumatic osteoarthritis of right knee [M17.31]  Other old tear of lateral meniscus of right knee [M23.200]     Post-Operative Diagnosis: Post-Op Diagnosis Codes:     * Post-traumatic osteoarthritis of right  knee [M17.31]     * Other old tear of lateral meniscus of right knee [M23.200]     Anesthesia: General     Intraoperative Findings:  The patellofemoral joint was noted to have grade 4 chondromalacia throughout the patella and grade 3 throughout the trochlea.  The ACL and PCL were intact.  The medial compartment was essentially pristine with an intact meniscus.  The lateral compartment demonstrated a large shredded meniscal flap which was a parrot-beak flap off the posterior horn.  There was some grade 3 and some grade 4 chondromalacia on the tibia and the femur.     Description of the Findings of the Procedure:  Patient was placed on the operating table supine position.  The knee was prepped and draped in the usual sterile manner for surgery.  Inferomedial and inferolateral portals were established and diagnostic arthroscopy was undertaken.  The findings of those stated above.  At this point I turned my attention to the patellofemoral joint.  I used the shaver and debrided gently debrided the undersurface of the patella which had a lot of fragmented hanging cartilage.  I now debrided the trochlea.  I turned my attention now to the medial compartment.  There series of Zulema and biters were utilized and 60% of the posterior horn of the lateral meniscus was excised.  I now removed the arthroscopic equipment and the portals were closed with nylon stitches.  Sterile dressings were applied and the patient was noted to be in stable condition         Review of Systems:    Musculoskeletal:  See HPI       Objective:        Physical Examination:    Vital Signs: There were no vitals filed for this visit.    Body mass index is 26.83 kg/m².    This a well-developed, well nourished patient in no acute distress.  They are alert and oriented and cooperative to examination.        Examination of the right knee, patient with 3 arthroscopic portals.  In all appear to be well healed.  Skin is dry and intact, no erythema ecchymosis, no  signs symptoms of infection.  No evidence of wound failure dehiscence.  We removed his simple interrupted sutures today.  We reinforced the surgical sites with sterile tape dressings.  He still may have a +1 effusion.  Mild pain with range of motion.    Pertinent New Results:     XRAY Report / Interpretation:        Assessment:       1. S/P right knee arthroscopy      Plan:     S/P right knee arthroscopy        Follow up in about 4 weeks (around 1/20/2023) for PO Right knee scope 12/14/22 Tues or Thurs.    Stable status post right knee arthroscopy for lateral meniscal tear.  Found to have extensive grade 3 grade 4 chondromalacia of the patellofemoral and lateral compartments.  And a significant amount of the lateral meniscus was excised.  Time will tell whether not the underlying arthritis of the meniscal tear were the main generator for his pain.  We discussed a steroid injection at his next visit if he still symptomatic.  We will see him back in a month.    [unfilled]  BETH Price PA-C    This note was created using Wordlock voice recognition software that occasionally misinterprets words or phrases.

## 2022-12-27 ENCOUNTER — TELEPHONE (OUTPATIENT)
Dept: ORTHOPEDICS | Facility: CLINIC | Age: 62
End: 2022-12-27

## 2022-12-27 DIAGNOSIS — M23.200 OTHER OLD TEAR OF LATERAL MENISCUS OF RIGHT KNEE: ICD-10-CM

## 2022-12-27 RX ORDER — OXYCODONE AND ACETAMINOPHEN 7.5; 325 MG/1; MG/1
1 TABLET ORAL EVERY 8 HOURS PRN
Qty: 21 TABLET | Refills: 0 | Status: SHIPPED | OUTPATIENT
Start: 2022-12-27 | End: 2023-03-14

## 2022-12-27 NOTE — TELEPHONE ENCOUNTER
Refill done.  Percocet 7.5/325 with instructions to take 1 tab by mouth every 8 hours as needed for pain.  Prescribed quantity 21.  Status post knee arthroscopy.      ----- Message from Ariella Sharp sent at 12/27/2022 10:25 AM CST -----  Regarding: Pharmacy request  Pharmacy called reharding Pt's script of Percocet. Pharmacist stated there was an error with the system and asked if we could resend the Script again. -[facility]       Phone #: [phone]  Patient is requesting a refill of oxyCODONE-acetaminophen (PERCOCET) 7.5-325 mg per tablet  Pharmacy:   [id number] - [place] [address] AT [address]  [address] [place] [address]  Phone: [phone] Fax: [phone]

## 2022-12-28 DIAGNOSIS — M23.200 OTHER OLD TEAR OF LATERAL MENISCUS OF RIGHT KNEE: ICD-10-CM

## 2022-12-28 RX ORDER — OXYCODONE AND ACETAMINOPHEN 7.5; 325 MG/1; MG/1
1 TABLET ORAL EVERY 8 HOURS PRN
Qty: 21 TABLET | Refills: 0 | Status: CANCELLED | OUTPATIENT
Start: 2022-12-28

## 2023-02-02 ENCOUNTER — OFFICE VISIT (OUTPATIENT)
Dept: ORTHOPEDICS | Facility: CLINIC | Age: 63
End: 2023-02-02
Payer: MEDICAID

## 2023-02-02 VITALS
BODY MASS INDEX: 40.43 KG/M2 | HEIGHT: 74 IN | SYSTOLIC BLOOD PRESSURE: 120 MMHG | WEIGHT: 315 LBS | DIASTOLIC BLOOD PRESSURE: 90 MMHG

## 2023-02-02 DIAGNOSIS — Z98.890 S/P RIGHT KNEE ARTHROSCOPY: Primary | ICD-10-CM

## 2023-02-02 PROCEDURE — 1160F RVW MEDS BY RX/DR IN RCRD: CPT | Mod: CPTII,S$GLB,, | Performed by: ORTHOPAEDIC SURGERY

## 2023-02-02 PROCEDURE — 99024 POSTOP FOLLOW-UP VISIT: CPT | Mod: S$GLB,,, | Performed by: ORTHOPAEDIC SURGERY

## 2023-02-02 PROCEDURE — 1159F MED LIST DOCD IN RCRD: CPT | Mod: CPTII,S$GLB,, | Performed by: ORTHOPAEDIC SURGERY

## 2023-02-02 PROCEDURE — 99024 PR POST-OP FOLLOW-UP VISIT: ICD-10-PCS | Mod: S$GLB,,, | Performed by: ORTHOPAEDIC SURGERY

## 2023-02-02 PROCEDURE — 3080F PR MOST RECENT DIASTOLIC BLOOD PRESSURE >= 90 MM HG: ICD-10-PCS | Mod: CPTII,S$GLB,, | Performed by: ORTHOPAEDIC SURGERY

## 2023-02-02 PROCEDURE — 1160F PR REVIEW ALL MEDS BY PRESCRIBER/CLIN PHARMACIST DOCUMENTED: ICD-10-PCS | Mod: CPTII,S$GLB,, | Performed by: ORTHOPAEDIC SURGERY

## 2023-02-02 PROCEDURE — 3074F PR MOST RECENT SYSTOLIC BLOOD PRESSURE < 130 MM HG: ICD-10-PCS | Mod: CPTII,S$GLB,, | Performed by: ORTHOPAEDIC SURGERY

## 2023-02-02 PROCEDURE — 1159F PR MEDICATION LIST DOCUMENTED IN MEDICAL RECORD: ICD-10-PCS | Mod: CPTII,S$GLB,, | Performed by: ORTHOPAEDIC SURGERY

## 2023-02-02 PROCEDURE — 3008F PR BODY MASS INDEX (BMI) DOCUMENTED: ICD-10-PCS | Mod: CPTII,S$GLB,, | Performed by: ORTHOPAEDIC SURGERY

## 2023-02-02 PROCEDURE — 3080F DIAST BP >= 90 MM HG: CPT | Mod: CPTII,S$GLB,, | Performed by: ORTHOPAEDIC SURGERY

## 2023-02-02 PROCEDURE — 3074F SYST BP LT 130 MM HG: CPT | Mod: CPTII,S$GLB,, | Performed by: ORTHOPAEDIC SURGERY

## 2023-02-02 PROCEDURE — 3008F BODY MASS INDEX DOCD: CPT | Mod: CPTII,S$GLB,, | Performed by: ORTHOPAEDIC SURGERY

## 2023-02-02 NOTE — PROGRESS NOTES
[facility] ORTHOPEDICS POST-OP NOTE    Subjective:           Chief Complaint:   Chief Complaint   Patient presents with    Right Knee - Post-op Evaluation     S/p right knee scope on 12/14/22, doing good, still pops       Past Medical History:   Diagnosis Date    Arthritis     Back pain     CAD (coronary artery disease) 01/13/2017    Cervical spinal stenosis     Digestive disorder     Hyperlipidemia     Hypertension     Migraine headache     Neck pain     TIA (transient ischemic attack)        Past Surgical History:   Procedure Laterality Date    APPENDECTOMY      CERVICAL FUSION  2009    CROSS FINGER FLAP      FRACTURE SURGERY      steel librado in right leg    KNEE ARTHROSCOPY Right     KNEE ARTHROSCOPY W/ MENISCECTOMY Right 12/14/2022    Procedure: ARTHROSCOPY, KNEE, WITH MENISCECTOMY;  Surgeon: Jeovany Marie MD;  Location: [facility];  Service: Orthopedics;  Laterality: Right;    ORTHOPEDIC SURGERY Right     librado from knee to ankle    RHIZOTOMY  08/2016    SPINE SURGERY         Current Outpatient Medications   Medication Sig    ELIQUIS 5 mg Tab Take 5 mg by mouth 2 (two) times daily.    losartan-hydrochlorothiazide 50-12.5 mg (HYZAAR) 50-12.5 mg per tablet Take 1 tablet by mouth once daily.    metoprolol succinate (TOPROL-XL) 25 MG 24 hr tablet Take 25 mg by mouth once daily.    omeprazole (PRILOSEC) 40 MG capsule Take 1 capsule (40 mg total) by mouth once daily.    rosuvastatin (CRESTOR) 10 MG tablet TAKE 1 TABLET(10 MG) BY MOUTH EVERY EVENING    oxyCODONE-acetaminophen (PERCOCET) 7.5-325 mg per tablet Take 1 tablet by mouth every 8 (eight) hours as needed for Pain.     No current facility-administered medications for this visit.       Review of patient's allergies indicates:  No Known Allergies    Family History   Problem Relation Age of Onset    Hypertension Mother     No Known Problems Sister     No Known Problems Brother        Social History     Socioeconomic History    Marital status:    Tobacco Use     Smoking status: Never    Smokeless tobacco: Never   Substance and Sexual Activity    Alcohol use: No    Drug use: No    Sexual activity: Yes     Partners: Female     Social Determinants of Health     Financial Resource Strain: Low Risk     Difficulty of Paying Living Expenses: Not very hard   Food Insecurity: No Food Insecurity    Worried About Running Out of Food in the Last Year: Never true    Ran Out of Food in the Last Year: Never true   Transportation Needs: No Transportation Needs    Lack of Transportation (Medical): No    Lack of Transportation (Non-Medical): No   Physical Activity: Unknown    Days of Exercise per Week: 0 days   Social Connections: Unknown    Frequency of Communication with Friends and Family: More than three times a week    Frequency of Social Gatherings with Friends and Family: Once a week    Active Member of Clubs or Organizations: No    Marital Status:    Housing Stability: Low Risk     Unable to Pay for Housing in the Last Year: No    Number of Places Lived in the Last Year: 1    Unstable Housing in the Last Year: No       History of present illness:  Mr. Berg comes in for follow-up for his right knee arthroscopy.  He is 6 weeks postop.  He continues to have feelings of popping and crepitus with the knee hanging up.  He did have a partial lateral meniscectomy.  He was found have grade 4 chondromalacia in the patellofemoral and lateral compartments.  Medial compartment was pristine.    Review of Systems:    Musculoskeletal:  See HPI      Objective:        Physical Examination:    Vital Signs:    Vitals:    02/02/23 0843   BP: (!) 120/90       Body mass index is 42.37 kg/m².    This a well-developed, well nourished patient in no acute distress.  They are alert and oriented and cooperative to examination.        Right knee exam:  Skin to the right knee is clean dry and intact.  There is no erythema or ecchymosis.  There are no signs or symptoms of infection.  Patient is  "neurovascularly intact throughout the right lower extremity.  Right calf is soft and nontender.  Two arthroscopic portals both well healed without wound dehiscence or drainage.  Patient can range of motion of the right knee actively approximately 0-120 degrees.  His knee is stable to varus and valgus stresses while held in extension.  He does have some crepitus with range of motioning.  He is diffusely tender along the lateral aspect.  He can weightbear as tolerated on his right lower extremity with a nonantalgic gait.      Pertinent New Results:    XRAY Report / Interpretation:   No new radiographs were taken on today's clinic visit.    Assessment/Plan:      1. Status post right knee arthroscopy.  2. Right knee osteoarthritis.      We had a long and danielle discussion with the patient today.  Definitive treatment for his knee is going to require total knee arthroplasty.  He reports he is unable to proceed with surgical intervention at this time as his son is pending surgery himself.  We did offer him an intra-articular corticosteroid injection today.  However, on his last 2 occasions had a severe case of hiccups that took several days to resolve.  Therefore, we will hold off on that.  He is welcome to follow up with us on an as-needed basis at any point in the future whenever he is ready to schedule his total knee arthroplasty.    Sy Mcfarland, Physician Assistant, served in the capacity as a "scribe" for this patient encounter.  A "face-to-face" encounter occurred with Dr. Jeovany Marie on this date.  The treatment plan and medical decision-making is outlined above. Patient was seen and examined with a chaperone.     This note was created using Dragon voice recognition software that occasionally misinterpreted phrases or words.        "

## 2023-03-13 NOTE — PROGRESS NOTES
SUBJECTIVE:      Patient ID: Otis Colón is a 62 y.o. male.    Chief Complaint: Hypertension    Mr Colón is here today to f/u on htn and hyperlipidemia. He is following with Dr Thibodeaux for cardiology.    Hypertension  This is a chronic problem. The current episode started more than 1 year ago. The problem is unchanged. The problem is controlled. Pertinent negatives include no anxiety, chest pain, headaches, neck pain, palpitations, peripheral edema or shortness of breath. Risk factors for coronary artery disease include obesity, male gender and dyslipidemia. Past treatments include diuretics, angiotensin blockers, calcium channel blockers and beta blockers. The current treatment provides moderate improvement.   Hyperlipidemia  This is a chronic problem. The current episode started more than 1 year ago. The problem is controlled. Recent lipid tests were reviewed and are normal. Pertinent negatives include no chest pain or shortness of breath. Current antihyperlipidemic treatment includes statins. The current treatment provides significant improvement of lipids. Risk factors for coronary artery disease include hypertension, male sex, obesity and dyslipidemia.     Past Surgical History:   Procedure Laterality Date    APPENDECTOMY      CERVICAL FUSION  2009    CROSS FINGER FLAP      FRACTURE SURGERY      steel librado in right leg    KNEE ARTHROSCOPY Right     KNEE ARTHROSCOPY W/ MENISCECTOMY Right 12/14/2022    Procedure: ARTHROSCOPY, KNEE, WITH MENISCECTOMY;  Surgeon: Jeovany Marie MD;  Location: [facility];  Service: Orthopedics;  Laterality: Right;    ORTHOPEDIC SURGERY Right     librado from knee to ankle    RHIZOTOMY  08/2016    SPINE SURGERY       Family History   Problem Relation Age of Onset    Hypertension Mother     No Known Problems Sister     No Known Problems Brother       Social History     Socioeconomic History    Marital status:    Tobacco Use    Smoking status: Never     Passive exposure:  Past    Smokeless tobacco: Never   Substance and Sexual Activity    Alcohol use: No    Drug use: No    Sexual activity: Yes     Partners: Female     Social Determinants of Health     Financial Resource Strain: Low Risk     Difficulty of Paying Living Expenses: Not very hard   Food Insecurity: No Food Insecurity    Worried About Running Out of Food in the Last Year: Never true    Ran Out of Food in the Last Year: Never true   Transportation Needs: No Transportation Needs    Lack of Transportation (Medical): No    Lack of Transportation (Non-Medical): No   Physical Activity: Unknown    Days of Exercise per Week: 0 days   Social Connections: Unknown    Frequency of Communication with Friends and Family: More than three times a week    Frequency of Social Gatherings with Friends and Family: Once a week    Active Member of Clubs or Organizations: No    Marital Status:    Housing Stability: Low Risk     Unable to Pay for Housing in the Last Year: No    Number of Places Lived in the Last Year: 1    Unstable Housing in the Last Year: No     Current Outpatient Medications   Medication Sig Dispense Refill    ELIQUIS 5 mg Tab Take 5 mg by mouth 2 (two) times daily.      metoprolol succinate (TOPROL-XL) 25 MG 24 hr tablet Take 25 mg by mouth once daily.      omeprazole (PRILOSEC) 40 MG capsule TAKE 1 CAPSULE(40 MG) BY MOUTH EVERY DAY 30 capsule 1    losartan-hydrochlorothiazide 50-12.5 mg (HYZAAR) 50-12.5 mg per tablet Take 1 tablet by mouth once daily. 90 tablet 1    rosuvastatin (CRESTOR) 10 MG tablet Take 1 tablet (10 mg total) by mouth every evening. 90 tablet 1     No current facility-administered medications for this visit.     Review of patient's allergies indicates:  No Known Allergies   Past Medical History:   Diagnosis Date    Arthritis     Back pain     CAD (coronary artery disease) 01/13/2017    Cervical spinal stenosis     Digestive disorder     Hyperlipidemia     Hypertension     Migraine headache     Neck  "pain     TIA (transient ischemic attack)      Past Surgical History:   Procedure Laterality Date    APPENDECTOMY      CERVICAL FUSION  2009    CROSS FINGER FLAP      FRACTURE SURGERY      steel librado in right leg    KNEE ARTHROSCOPY Right     KNEE ARTHROSCOPY W/ MENISCECTOMY Right 12/14/2022    Procedure: ARTHROSCOPY, KNEE, WITH MENISCECTOMY;  Surgeon: Jeovany Marie MD;  Location: [facility];  Service: Orthopedics;  Laterality: Right;    ORTHOPEDIC SURGERY Right     lbirado from knee to ankle    RHIZOTOMY  08/2016    SPINE SURGERY         Review of Systems   Constitutional:  Negative for activity change, appetite change, chills, diaphoresis and unexpected weight change.   HENT:  Negative for ear discharge, facial swelling, hearing loss, nosebleeds, rhinorrhea and trouble swallowing.    Eyes:  Negative for photophobia, pain, discharge and visual disturbance.   Respiratory:  Negative for apnea, choking, chest tightness, shortness of breath and wheezing.    Cardiovascular:  Negative for chest pain and palpitations.   Gastrointestinal:  Negative for abdominal pain, blood in stool, constipation, diarrhea and vomiting.   Endocrine: Negative for polydipsia, polyphagia and polyuria.   Genitourinary:  Negative for difficulty urinating, hematuria and urgency.   Musculoskeletal:  Positive for arthralgias. Negative for gait problem, joint swelling and neck pain.   Skin:  Negative for pallor.   Neurological:  Negative for dizziness, seizures, speech difficulty, weakness and headaches.   Hematological:  Does not bruise/bleed easily.   Psychiatric/Behavioral:  Negative for agitation, confusion, dysphoric mood, self-injury, sleep disturbance and suicidal ideas. The patient is not nervous/anxious.     OBJECTIVE:      Vitals:    03/14/23 0819   BP: 110/80   Pulse: 60   Temp: 98.2 °F (36.8 °C)   SpO2: 98%   Weight: (!) 152.9 kg (337 lb)   Height: 6' 2" (1.88 m)     Physical Exam  Vitals and nursing note reviewed.   Constitutional:       " General: He is not in acute distress.     Appearance: He is well-developed.   HENT:      Head: Normocephalic and atraumatic.      Nose: Nose normal.      Mouth/Throat:      Pharynx: Uvula midline.   Eyes:      General: Lids are normal.      Conjunctiva/sclera: Conjunctivae normal.      Pupils: Pupils are equal, round, and reactive to light.      Right eye: Pupil is round and reactive.      Left eye: Pupil is round and reactive.   Neck:      Thyroid: No thyromegaly.      Vascular: No carotid bruit.   Cardiovascular:      Rate and Rhythm: Normal rate and regular rhythm.      Heart sounds: Normal heart sounds.   Pulmonary:      Effort: Pulmonary effort is normal.      Breath sounds: Normal breath sounds.   Abdominal:      General: Bowel sounds are normal.      Palpations: Abdomen is soft. Abdomen is not rigid.      Tenderness: There is no abdominal tenderness.   Musculoskeletal:         General: Normal range of motion.      Cervical back: Normal range of motion and neck supple.   Lymphadenopathy:      Cervical: No cervical adenopathy.   Skin:     General: Skin is warm and dry.      Nails: There is no clubbing.   Neurological:      Mental Status: He is alert and oriented to person, place, and time.   Psychiatric:         Speech: Speech normal.         Behavior: Behavior is cooperative.      No visits with results within 3 Month(s) from this visit.   Latest known visit with results is:   Lab Visit on 12/05/2022   Component Date Value Ref Range Status    Sodium 12/05/2022 134 (L)  136 - 145 mmol/L Final    Potassium 12/05/2022 3.9  3.5 - 5.1 mmol/L Final    Chloride 12/05/2022 102  95 - 110 mmol/L Final    CO2 12/05/2022 22 (L)  23 - 29 mmol/L Final    Glucose 12/05/2022 98  70 - 110 mg/dL Final    BUN 12/05/2022 13  8 - 23 mg/dL Final    Creatinine 12/05/2022 1.2  0.5 - 1.4 mg/dL Final    Calcium 12/05/2022 9.0  8.7 - 10.5 mg/dL Final    Total Protein 12/05/2022 6.9  6.0 - 8.4 g/dL Final    Albumin 12/05/2022 3.8  3.5 -  5.2 g/dL Final    Total Bilirubin 12/05/2022 0.9  0.1 - 1.0 mg/dL Final    Comment: For infants and newborns, interpretation of results should be based  on gestational age, weight and in agreement with clinical  observations.    Premature Infant recommended reference ranges:  Up to 24 hours.............<8.0 mg/dL  Up to 48 hours............<12.0 mg/dL  3-5 days..................<15.0 mg/dL  6-29 days.................<15.0 mg/dL      Alkaline Phosphatase 12/05/2022 64  55 - 135 U/L Final    AST 12/05/2022 15  10 - 40 U/L Final    ALT 12/05/2022 18  10 - 44 U/L Final    Anion Gap 12/05/2022 10  8 - 16 mmol/L Final    eGFR 12/05/2022 >60.0  >60 mL/min/1.73 m^2 Final    Cholesterol 12/05/2022 143  120 - 199 mg/dL Final    Comment: The National Cholesterol Education Program (NCEP) has set the  following guidelines (reference ranges) for Cholesterol:  Optimal.....................<200 mg/dL  Borderline High.............200-239 mg/dL  High........................> or = 240 mg/dL      Triglycerides 12/05/2022 91  30 - 150 mg/dL Final    Comment: The National Cholesterol Education Program (NCEP) has set the  following guidelines (reference values) for triglycerides:  Normal......................<150 mg/dL  Borderline High.............150-199 mg/dL  High........................200-499 mg/dL      HDL 12/05/2022 45  40 - 75 mg/dL Final    Comment: The National Cholesterol Education Program (NCEP) has set the  following guidelines (reference values) for HDL Cholesterol:  Low...............<40 mg/dL  Optimal...........>60 mg/dL      LDL Cholesterol 12/05/2022 79.8  63.0 - 159.0 mg/dL Final    Comment: The National Cholesterol Education Program (NCEP) has set the  following guidelines (reference values) for LDL Cholesterol:  Optimal.......................<130 mg/dL  Borderline High...............130-159 mg/dL  High..........................160-189 mg/dL  Very High.....................>190 mg/dL      HDL/Cholesterol Ratio 12/05/2022  31.5  20.0 - 50.0 % Final    Total Cholesterol/HDL Ratio 12/05/2022 3.2  2.0 - 5.0 Final    Non-HDL Cholesterol 12/05/2022 98  mg/dL Final    Comment: Risk category and Non-HDL cholesterol goals:  Coronary heart disease (CHD)or equivalent (10-year risk of CHD >20%):  Non-HDL cholesterol goal     <130 mg/dL  Two or more CHD risk factors and 10-year risk of CHD <= 20%:  Non-HDL cholesterol goal     <160 mg/dL  0 to 1 CHD risk factor:  Non-HDL cholesterol goal     <190 mg/dL      PSA, Screen 12/05/2022 0.26  0.00 - 4.00 ng/mL Final    Hemoglobin A1C 12/05/2022 5.8  4.5 - 6.2 % Final    Comment: According to ADA guidelines, hemoglobin A1C <7.0% represents  optimal control in non-pregnant diabetic patients.  Different  metrics may apply to specific populations.   Standards of Medical Care in Diabetes - 2016.    For the purpose of screening for the presence of diabetes:  <5.7%     Consistent with the absence of diabetes  5.7-6.4%  Consistent with increasing risk for diabetes   (prediabetes)  >or=6.5%  Consistent with diabetes    Currently no consensus exists for use of hemoglobin A1C  for diagnosis of diabetes for children.      Estimated Avg Glucose 12/05/2022 120  68 - 131 mg/dL Final   ]  Assessment:       1. Essential hypertension    2. Pure hypercholesterolemia    3. Elevated glucose        Plan:       Essential hypertension  -     losartan-hydrochlorothiazide 50-12.5 mg (HYZAAR) 50-12.5 mg per tablet; Take 1 tablet by mouth once daily.  Dispense: 90 tablet; Refill: 1    Pure hypercholesterolemia  -     rosuvastatin (CRESTOR) 10 MG tablet; Take 1 tablet (10 mg total) by mouth every evening.  Dispense: 90 tablet; Refill: 1    Elevated glucose  recommend eating low carb diet. Stay away from simple sugars.   Will cont to monitor    Follow up in about 6 months (around 9/14/2023) for htn.      3/14/2023 PHILLIP Rivera, FNP

## 2023-03-14 ENCOUNTER — OFFICE VISIT (OUTPATIENT)
Dept: FAMILY MEDICINE | Facility: CLINIC | Age: 63
End: 2023-03-14
Payer: MEDICAID

## 2023-03-14 VITALS
HEIGHT: 74 IN | OXYGEN SATURATION: 98 % | WEIGHT: 315 LBS | HEART RATE: 60 BPM | DIASTOLIC BLOOD PRESSURE: 80 MMHG | TEMPERATURE: 98 F | SYSTOLIC BLOOD PRESSURE: 110 MMHG | BODY MASS INDEX: 40.43 KG/M2

## 2023-03-14 DIAGNOSIS — R73.09 ELEVATED GLUCOSE: ICD-10-CM

## 2023-03-14 DIAGNOSIS — I10 ESSENTIAL HYPERTENSION: Primary | ICD-10-CM

## 2023-03-14 DIAGNOSIS — E78.00 PURE HYPERCHOLESTEROLEMIA: ICD-10-CM

## 2023-03-14 PROCEDURE — 99214 PR OFFICE/OUTPT VISIT, EST, LEVL IV, 30-39 MIN: ICD-10-PCS | Mod: S$GLB,,, | Performed by: NURSE PRACTITIONER

## 2023-03-14 PROCEDURE — 1160F RVW MEDS BY RX/DR IN RCRD: CPT | Mod: CPTII,S$GLB,, | Performed by: NURSE PRACTITIONER

## 2023-03-14 PROCEDURE — 3008F PR BODY MASS INDEX (BMI) DOCUMENTED: ICD-10-PCS | Mod: CPTII,S$GLB,, | Performed by: NURSE PRACTITIONER

## 2023-03-14 PROCEDURE — 3079F DIAST BP 80-89 MM HG: CPT | Mod: CPTII,S$GLB,, | Performed by: NURSE PRACTITIONER

## 2023-03-14 PROCEDURE — 1159F PR MEDICATION LIST DOCUMENTED IN MEDICAL RECORD: ICD-10-PCS | Mod: CPTII,S$GLB,, | Performed by: NURSE PRACTITIONER

## 2023-03-14 PROCEDURE — 1159F MED LIST DOCD IN RCRD: CPT | Mod: CPTII,S$GLB,, | Performed by: NURSE PRACTITIONER

## 2023-03-14 PROCEDURE — 3074F PR MOST RECENT SYSTOLIC BLOOD PRESSURE < 130 MM HG: ICD-10-PCS | Mod: CPTII,S$GLB,, | Performed by: NURSE PRACTITIONER

## 2023-03-14 PROCEDURE — 1160F PR REVIEW ALL MEDS BY PRESCRIBER/CLIN PHARMACIST DOCUMENTED: ICD-10-PCS | Mod: CPTII,S$GLB,, | Performed by: NURSE PRACTITIONER

## 2023-03-14 PROCEDURE — 99214 OFFICE O/P EST MOD 30 MIN: CPT | Mod: S$GLB,,, | Performed by: NURSE PRACTITIONER

## 2023-03-14 PROCEDURE — 3008F BODY MASS INDEX DOCD: CPT | Mod: CPTII,S$GLB,, | Performed by: NURSE PRACTITIONER

## 2023-03-14 PROCEDURE — 3074F SYST BP LT 130 MM HG: CPT | Mod: CPTII,S$GLB,, | Performed by: NURSE PRACTITIONER

## 2023-03-14 PROCEDURE — 3079F PR MOST RECENT DIASTOLIC BLOOD PRESSURE 80-89 MM HG: ICD-10-PCS | Mod: CPTII,S$GLB,, | Performed by: NURSE PRACTITIONER

## 2023-03-14 RX ORDER — ROSUVASTATIN CALCIUM 10 MG/1
10 TABLET, COATED ORAL NIGHTLY
Qty: 90 TABLET | Refills: 1 | Status: SHIPPED | OUTPATIENT
Start: 2023-03-14 | End: 2023-04-11 | Stop reason: ALTCHOICE

## 2023-03-14 RX ORDER — LOSARTAN POTASSIUM AND HYDROCHLOROTHIAZIDE 12.5; 5 MG/1; MG/1
1 TABLET ORAL DAILY
Qty: 90 TABLET | Refills: 1 | Status: SHIPPED | OUTPATIENT
Start: 2023-03-14

## 2023-04-11 ENCOUNTER — TELEPHONE (OUTPATIENT)
Dept: FAMILY MEDICINE | Facility: CLINIC | Age: 63
End: 2023-04-11

## 2023-04-11 DIAGNOSIS — E78.00 PURE HYPERCHOLESTEROLEMIA: Primary | ICD-10-CM

## 2023-04-11 RX ORDER — ATORVASTATIN CALCIUM 20 MG/1
20 TABLET, FILM COATED ORAL NIGHTLY
Qty: 90 TABLET | Refills: 0 | Status: SHIPPED | OUTPATIENT
Start: 2023-04-11 | End: 2023-07-26

## 2023-06-08 DIAGNOSIS — K21.9 GASTROESOPHAGEAL REFLUX DISEASE, UNSPECIFIED WHETHER ESOPHAGITIS PRESENT: ICD-10-CM

## 2023-06-08 DIAGNOSIS — R06.02 SHORTNESS OF BREATH: ICD-10-CM

## 2023-06-08 DIAGNOSIS — R07.9 CHEST PAIN, UNSPECIFIED TYPE: Primary | ICD-10-CM

## 2023-06-08 RX ORDER — OMEPRAZOLE 40 MG/1
CAPSULE, DELAYED RELEASE ORAL
Qty: 30 CAPSULE | Refills: 1 | Status: SHIPPED | OUTPATIENT
Start: 2023-06-08 | End: 2023-08-07

## 2023-07-03 ENCOUNTER — TELEPHONE (OUTPATIENT)
Dept: CARDIOLOGY | Facility: HOSPITAL | Age: 63
End: 2023-07-03

## 2023-07-03 NOTE — TELEPHONE ENCOUNTER
Patient advised, [facility] [facility]   Will need to register on the first floor at the main entrance.   Patient advised that arrival time is 9:30am.  Patient advised that he may be here about 2.5-3 hours, and may want to bring something to occupy their time, as there will be periods of waiting.    Patient advised, may take his medications prior to testing if you need to.  Advised if he needs to eat to take his medications, please keep it light, like toast and juice.    Patient advised to avoid all caffeine 12 hours prior to testing.  This includes decaf tea and coffee.    Will provide peanut butter crackers for a snack after stress test.  If patient would prefer something else, please bring a snack from home.    Wear comfortable clothing.   No lotions, oils, or powders to the upper chest area. May wear deodorant.    No metal jewelry, buttons, or zippers to the upper body.  Patient verbalizes understanding of instructions.

## 2023-07-05 ENCOUNTER — HOSPITAL ENCOUNTER (OUTPATIENT)
Dept: RADIOLOGY | Facility: HOSPITAL | Age: 63
Discharge: HOME | End: 2023-07-05
Attending: NURSE PRACTITIONER
Payer: MEDICAID

## 2023-07-05 ENCOUNTER — HOSPITAL ENCOUNTER (OUTPATIENT)
Dept: CARDIOLOGY | Facility: HOSPITAL | Age: 63
Discharge: HOME | End: 2023-07-05
Attending: NURSE PRACTITIONER
Payer: MEDICAID

## 2023-07-05 DIAGNOSIS — R07.9 CHEST PAIN, UNSPECIFIED TYPE: ICD-10-CM

## 2023-07-05 DIAGNOSIS — R06.02 SHORTNESS OF BREATH: ICD-10-CM

## 2023-07-05 PROCEDURE — 78452 HT MUSCLE IMAGE SPECT MULT: CPT | Mod: 26,,, | Performed by: INTERNAL MEDICINE

## 2023-07-05 PROCEDURE — A9502 TC99M TETROFOSMIN: HCPCS

## 2023-07-05 PROCEDURE — 93016 NUCLEAR STRESS - CARDIOLOGY INTERPRETED (CUPID ONLY): ICD-10-PCS | Mod: ,,, | Performed by: NURSE PRACTITIONER

## 2023-07-05 PROCEDURE — 93018 NUCLEAR STRESS - CARDIOLOGY INTERPRETED (CUPID ONLY): ICD-10-PCS | Mod: ,,, | Performed by: INTERNAL MEDICINE

## 2023-07-05 PROCEDURE — 93017 CV STRESS TEST TRACING ONLY: CPT

## 2023-07-05 PROCEDURE — 93018 CV STRESS TEST I&R ONLY: CPT | Mod: ,,, | Performed by: INTERNAL MEDICINE

## 2023-07-05 PROCEDURE — 78452 NUCLEAR STRESS - CARDIOLOGY INTERPRETED (CUPID ONLY): ICD-10-PCS | Mod: 26,,, | Performed by: INTERNAL MEDICINE

## 2023-07-05 PROCEDURE — 93016 CV STRESS TEST SUPVJ ONLY: CPT | Mod: ,,, | Performed by: NURSE PRACTITIONER

## 2023-07-05 RX ORDER — REGADENOSON 0.08 MG/ML
0.4 INJECTION, SOLUTION INTRAVENOUS ONCE
Status: COMPLETED | OUTPATIENT
Start: 2023-07-05 | End: 2023-07-05

## 2023-07-05 RX ADMIN — REGADENOSON 0.4 MG: 0.08 INJECTION, SOLUTION INTRAVENOUS at 08:07

## 2023-07-06 ENCOUNTER — HOSPITAL ENCOUNTER (OUTPATIENT)
Dept: RADIOLOGY | Facility: HOSPITAL | Age: 63
Discharge: HOME | End: 2023-07-06
Attending: NURSE PRACTITIONER
Payer: MEDICAID

## 2023-07-07 LAB
CV PHARM DOSE: 0.4 MG
CV STRESS BASE HR: 60 BPM
DIASTOLIC BLOOD PRESSURE: 80 MMHG
EJECTION FRACTION- HIGH: 65 %
END DIASTOLIC INDEX-HIGH: 153 ML/M2
END DIASTOLIC INDEX-LOW: 93 ML/M2
END SYSTOLIC INDEX-HIGH: 71 ML/M2
END SYSTOLIC INDEX-LOW: 31 ML/M2
NUC REST DIASTOLIC VOLUME INDEX: 96
NUC REST EJECTION FRACTION: 51
NUC REST SYSTOLIC VOLUME INDEX: 47
NUC STRESS DIASTOLIC VOLUME INDEX: 107
NUC STRESS EJECTION FRACTION: 63 %
NUC STRESS SYSTOLIC VOLUME INDEX: 40
OHS CV CPX 1 MINUTE RECOVERY HEART RATE: 90 BPM
OHS CV CPX 85 PERCENT MAX PREDICTED HEART RATE MALE: 133
OHS CV CPX MAX PREDICTED HEART RATE: 157
OHS CV CPX PATIENT IS FEMALE: 0
OHS CV CPX PATIENT IS MALE: 1
OHS CV CPX PEAK DIASTOLIC BLOOD PRESSURE: 78 MMHG
OHS CV CPX PEAK HEAR RATE: 90 BPM
OHS CV CPX PEAK RATE PRESSURE PRODUCT: NORMAL
OHS CV CPX PEAK SYSTOLIC BLOOD PRESSURE: 112 MMHG
OHS CV CPX PERCENT MAX PREDICTED HEART RATE ACHIEVED: 57
OHS CV CPX RATE PRESSURE PRODUCT PRESENTING: 7080
RETIRED EF AND QEF - SEE NOTES: 53 %
SYSTOLIC BLOOD PRESSURE: 118 MMHG

## 2023-07-25 DIAGNOSIS — E78.00 PURE HYPERCHOLESTEROLEMIA: ICD-10-CM

## 2023-07-26 RX ORDER — ATORVASTATIN CALCIUM 20 MG/1
TABLET, FILM COATED ORAL
Qty: 90 TABLET | Refills: 0 | Status: SHIPPED | OUTPATIENT
Start: 2023-07-26 | End: 2023-10-30

## 2023-08-07 DIAGNOSIS — K21.9 GASTROESOPHAGEAL REFLUX DISEASE, UNSPECIFIED WHETHER ESOPHAGITIS PRESENT: ICD-10-CM

## 2023-08-07 RX ORDER — OMEPRAZOLE 40 MG/1
CAPSULE, DELAYED RELEASE ORAL
Qty: 30 CAPSULE | Refills: 1 | Status: SHIPPED | OUTPATIENT
Start: 2023-08-07

## 2023-08-09 ENCOUNTER — PATIENT MESSAGE (OUTPATIENT)
Dept: FAMILY MEDICINE | Facility: CLINIC | Age: 63
End: 2023-08-09

## 2023-08-09 ENCOUNTER — TELEPHONE (OUTPATIENT)
Dept: FAMILY MEDICINE | Facility: CLINIC | Age: 63
End: 2023-08-09

## 2023-08-09 DIAGNOSIS — R73.09 ELEVATED GLUCOSE: ICD-10-CM

## 2023-08-09 DIAGNOSIS — I10 ESSENTIAL HYPERTENSION: ICD-10-CM

## 2023-08-09 DIAGNOSIS — E78.00 PURE HYPERCHOLESTEROLEMIA: Primary | ICD-10-CM

## 2023-08-28 ENCOUNTER — LAB VISIT (OUTPATIENT)
Dept: LAB | Facility: HOSPITAL | Age: 63
End: 2023-08-28
Attending: NURSE PRACTITIONER
Payer: MEDICAID

## 2023-08-28 DIAGNOSIS — R73.09 ELEVATED GLUCOSE: ICD-10-CM

## 2023-08-28 DIAGNOSIS — E78.00 PURE HYPERCHOLESTEROLEMIA: ICD-10-CM

## 2023-08-28 DIAGNOSIS — I10 ESSENTIAL HYPERTENSION: ICD-10-CM

## 2023-08-28 LAB
ALBUMIN SERPL BCP-MCNC: 4 G/DL (ref 3.5–5.2)
ALP SERPL-CCNC: 73 U/L (ref 55–135)
ALT SERPL W/O P-5'-P-CCNC: 22 U/L (ref 10–44)
ANION GAP SERPL CALC-SCNC: 2 MMOL/L (ref 8–16)
AST SERPL-CCNC: 19 U/L (ref 10–40)
BILIRUB SERPL-MCNC: 1.2 MG/DL (ref 0.1–1)
BUN SERPL-MCNC: 15 MG/DL (ref 8–23)
CALCIUM SERPL-MCNC: 8.6 MG/DL (ref 8.7–10.5)
CHLORIDE SERPL-SCNC: 105 MMOL/L (ref 95–110)
CHOLEST SERPL-MCNC: 156 MG/DL (ref 120–199)
CHOLEST/HDLC SERPL: 3.5 {RATIO} (ref 2–5)
CO2 SERPL-SCNC: 25 MMOL/L (ref 23–29)
CREAT SERPL-MCNC: 1.3 MG/DL (ref 0.5–1.4)
EST. GFR  (NO RACE VARIABLE): >60 ML/MIN/1.73 M^2
ESTIMATED AVG GLUCOSE: 105 MG/DL (ref 68–131)
GLUCOSE SERPL-MCNC: 107 MG/DL (ref 70–110)
HBA1C MFR BLD: 5.3 % (ref 4.5–6.2)
HDLC SERPL-MCNC: 44 MG/DL (ref 40–75)
HDLC SERPL: 28.2 % (ref 20–50)
LDLC SERPL CALC-MCNC: 92.8 MG/DL (ref 63–159)
NONHDLC SERPL-MCNC: 112 MG/DL
POTASSIUM SERPL-SCNC: 3.7 MMOL/L (ref 3.5–5.1)
PROT SERPL-MCNC: 7.4 G/DL (ref 6–8.4)
SODIUM SERPL-SCNC: 132 MMOL/L (ref 136–145)
TRIGL SERPL-MCNC: 96 MG/DL (ref 30–150)

## 2023-08-28 PROCEDURE — 36415 COLL VENOUS BLD VENIPUNCTURE: CPT | Performed by: NURSE PRACTITIONER

## 2023-08-28 PROCEDURE — 80061 LIPID PANEL: CPT | Performed by: NURSE PRACTITIONER

## 2023-08-28 PROCEDURE — 83036 HEMOGLOBIN GLYCOSYLATED A1C: CPT | Performed by: NURSE PRACTITIONER

## 2023-08-28 PROCEDURE — 80053 COMPREHEN METABOLIC PANEL: CPT | Performed by: NURSE PRACTITIONER

## 2023-08-29 ENCOUNTER — TELEPHONE (OUTPATIENT)
Dept: FAMILY MEDICINE | Facility: CLINIC | Age: 63
End: 2023-08-29

## 2023-08-29 NOTE — TELEPHONE ENCOUNTER
Pt notified of lab results.  Low sodium noted and to drink gatorade zero and will discuss more in his up coming office visit.

## 2023-08-29 NOTE — TELEPHONE ENCOUNTER
----- Message from Eric Mercado NP sent at 8/29/2023  8:32 AM CDT -----  Sodium is low, likely from HCTZ . He can drink gatorade zero to help and we will discuss at ov.

## 2023-09-13 NOTE — PROGRESS NOTES
SUBJECTIVE:      Patient ID: Otis Colón is a 62 y.o. male.    Chief Complaint: Hypertension    Mr Colón is here today to f/u on htn and hyperlipidemia. He is following with Dr Thibodeaux for cardiology. He is asking for a new referral for a second opinion regarding his heart. He is feeling fatigued, does not tolerate exertional exercise. He discussed with cardiology and did not feel it was a heart issue. He is taking his meds as prescribed. He does have a history of sleep apnea but does not use a cpap    Hypertension  This is a chronic problem. The current episode started more than 1 year ago. The problem is unchanged. The problem is controlled. Associated symptoms include shortness of breath. Pertinent negatives include no anxiety, chest pain, palpitations or peripheral edema. Risk factors for coronary artery disease include obesity, male gender and dyslipidemia. Past treatments include diuretics, angiotensin blockers, calcium channel blockers and beta blockers. The current treatment provides moderate improvement.   Hyperlipidemia  This is a chronic problem. The current episode started more than 1 year ago. The problem is controlled. Recent lipid tests were reviewed and are normal. Associated symptoms include shortness of breath. Pertinent negatives include no chest pain. Current antihyperlipidemic treatment includes statins. The current treatment provides significant improvement of lipids. Risk factors for coronary artery disease include hypertension, male sex, obesity and dyslipidemia.       Past Surgical History:   Procedure Laterality Date    APPENDECTOMY      CERVICAL FUSION  2009    CROSS FINGER FLAP      FRACTURE SURGERY      steel librado in right leg    KNEE ARTHROSCOPY Right     KNEE ARTHROSCOPY W/ MENISCECTOMY Right 12/14/2022    Procedure: ARTHROSCOPY, KNEE, WITH MENISCECTOMY;  Surgeon: Jeovany Marie MD;  Location: [facility];  Service: Orthopedics;  Laterality: Right;    ORTHOPEDIC SURGERY Right      librado from knee to ankle    RHIZOTOMY  08/2016    SPINE SURGERY       Family History   Problem Relation Age of Onset    Hypertension Mother     No Known Problems Sister     No Known Problems Brother       Social History     Socioeconomic History    Marital status:    Tobacco Use    Smoking status: Never     Passive exposure: Past    Smokeless tobacco: Never   Substance and Sexual Activity    Alcohol use: No    Drug use: No    Sexual activity: Yes     Partners: Female     Social Determinants of Health     Financial Resource Strain: Low Risk  (9/12/2022)    Overall Financial Resource Strain (CARDIA)     Difficulty of Paying Living Expenses: Not very hard   Food Insecurity: No Food Insecurity (9/12/2022)    Hunger Vital Sign     Worried About Running Out of Food in the Last Year: Never true     Ran Out of Food in the Last Year: Never true   Transportation Needs: No Transportation Needs (9/12/2022)    PRAPARE - Transportation     Lack of Transportation (Medical): No     Lack of Transportation (Non-Medical): No   Physical Activity: Unknown (9/12/2022)    Exercise Vital Sign     Days of Exercise per Week: 0 days   Stress: No Stress Concern Present (3/7/2021)    [employer] [place] [employer] - Occupational Stress Questionnaire     Feeling of Stress : Not at all   Social Connections: Unknown (9/12/2022)    Social Connection and Isolation Panel [NHANES]     Frequency of Communication with Friends and Family: More than three times a week     Frequency of Social Gatherings with Friends and Family: Once a week     Active Member of Clubs or Organizations: No     Marital Status:    Housing Stability: Low Risk  (9/12/2022)    Housing Stability Vital Sign     Unable to Pay for Housing in the Last Year: No     Number of Places Lived in the Last Year: 1     Unstable Housing in the Last Year: No     Current Outpatient Medications   Medication Sig Dispense Refill    atorvastatin (LIPITOR) 20 MG tablet TAKE 1  TABLET(20 MG) BY MOUTH EVERY EVENING 90 tablet 0    losartan-hydrochlorothiazide 50-12.5 mg (HYZAAR) 50-12.5 mg per tablet Take 1 tablet by mouth once daily. 90 tablet 1    omeprazole (PRILOSEC) 40 MG capsule TAKE 1 CAPSULE(40 MG) BY MOUTH EVERY DAY 30 capsule 1    XARELTO 20 mg Tab TAKE 1 TABLET BY MOUTH DAILY WITH DINNER       No current facility-administered medications for this visit.     Review of patient's allergies indicates:  No Known Allergies   Past Medical History:   Diagnosis Date    Arthritis     Back pain     CAD (coronary artery disease) 01/13/2017    Cervical spinal stenosis     Digestive disorder     Hyperlipidemia     Hypertension     Migraine headache     Neck pain     TIA (transient ischemic attack)      Past Surgical History:   Procedure Laterality Date    APPENDECTOMY      CERVICAL FUSION  2009    CROSS FINGER FLAP      FRACTURE SURGERY      steel librado in right leg    KNEE ARTHROSCOPY Right     KNEE ARTHROSCOPY W/ MENISCECTOMY Right 12/14/2022    Procedure: ARTHROSCOPY, KNEE, WITH MENISCECTOMY;  Surgeon: Jeovany Marie MD;  Location: [facility];  Service: Orthopedics;  Laterality: Right;    ORTHOPEDIC SURGERY Right     librado from knee to ankle    RHIZOTOMY  08/2016    SPINE SURGERY         Review of Systems   Constitutional:  Positive for fatigue. Negative for appetite change, chills, diaphoresis and unexpected weight change.   HENT:  Negative for ear discharge, facial swelling, hearing loss, nosebleeds and trouble swallowing.    Eyes:  Negative for photophobia, pain and visual disturbance.   Respiratory:  Positive for shortness of breath. Negative for apnea, choking and wheezing.    Cardiovascular:  Negative for chest pain, palpitations and leg swelling.   Gastrointestinal:  Negative for abdominal pain, blood in stool and vomiting.   Endocrine: Negative for polyphagia.   Genitourinary:  Negative for difficulty urinating and hematuria.   Musculoskeletal:  Negative for gait problem and joint swelling.  "  Skin:  Negative for pallor.   Neurological:  Negative for dizziness, seizures, speech difficulty and weakness.   Hematological:  Does not bruise/bleed easily.   Psychiatric/Behavioral:  Negative for agitation, confusion, dysphoric mood, self-injury, sleep disturbance and suicidal ideas. The patient is not nervous/anxious.       OBJECTIVE:      Vitals:    09/14/23 0819 09/14/23 0845   BP: (!) 120/90 122/86   Pulse: 61    Temp: 98.6 °F (37 °C)    SpO2: 96%    Weight: (!) 152 kg (335 lb)    Height: 6' 2" (1.88 m)      Physical Exam  Vitals and nursing note reviewed.   Constitutional:       General: He is not in acute distress.     Appearance: He is well-developed.   HENT:      Head: Normocephalic and atraumatic.      Nose: Nose normal.      Mouth/Throat:      Pharynx: Uvula midline.   Eyes:      General: Lids are normal.      Conjunctiva/sclera: Conjunctivae normal.      Pupils: Pupils are equal, round, and reactive to light.      Right eye: Pupil is round and reactive.      Left eye: Pupil is round and reactive.   Neck:      Thyroid: No thyromegaly.      Vascular: No carotid bruit.   Cardiovascular:      Rate and Rhythm: Normal rate and regular rhythm.      Pulses: Normal pulses.      Heart sounds: Normal heart sounds. No murmur heard.  Pulmonary:      Effort: Pulmonary effort is normal.      Breath sounds: Normal breath sounds. No wheezing, rhonchi or rales.   Abdominal:      General: Bowel sounds are normal.      Palpations: Abdomen is soft. Abdomen is not rigid.      Tenderness: There is no abdominal tenderness.   Musculoskeletal:         General: Normal range of motion.      Cervical back: Normal range of motion and neck supple.      Right lower leg: No edema.      Left lower leg: No edema.   Lymphadenopathy:      Cervical: No cervical adenopathy.   Skin:     General: Skin is warm and dry.      Nails: There is no clubbing.   Neurological:      Mental Status: He is alert and oriented to person, place, and time. "   Psychiatric:         Mood and Affect: Mood normal.         Speech: Speech normal.         Behavior: Behavior normal. Behavior is cooperative.         Thought Content: Thought content normal.         Judgment: Judgment normal.        Lab Visit on 08/28/2023   Component Date Value Ref Range Status    Sodium 08/28/2023 132 (L)  136 - 145 mmol/L Final    Potassium 08/28/2023 3.7  3.5 - 5.1 mmol/L Final    Chloride 08/28/2023 105  95 - 110 mmol/L Final    CO2 08/28/2023 25  23 - 29 mmol/L Final    Glucose 08/28/2023 107  70 - 110 mg/dL Final    BUN 08/28/2023 15  8 - 23 mg/dL Final    Creatinine 08/28/2023 1.3  0.5 - 1.4 mg/dL Final    Calcium 08/28/2023 8.6 (L)  8.7 - 10.5 mg/dL Final    Total Protein 08/28/2023 7.4  6.0 - 8.4 g/dL Final    Albumin 08/28/2023 4.0  3.5 - 5.2 g/dL Final    Total Bilirubin 08/28/2023 1.2 (H)  0.1 - 1.0 mg/dL Final    Comment: For infants and newborns, interpretation of results should be based  on gestational age, weight and in agreement with clinical  observations.    Premature Infant recommended reference ranges:  Up to 24 hours.............<8.0 mg/dL  Up to 48 hours............<12.0 mg/dL  3-5 days..................<15.0 mg/dL  6-29 days.................<15.0 mg/dL      Alkaline Phosphatase 08/28/2023 73  55 - 135 U/L Final    AST 08/28/2023 19  10 - 40 U/L Final    ALT 08/28/2023 22  10 - 44 U/L Final    eGFR 08/28/2023 >60.0  >60 mL/min/1.73 m^2 Final    Anion Gap 08/28/2023 2 (L)  8 - 16 mmol/L Final    Cholesterol 08/28/2023 156  120 - 199 mg/dL Final    Comment: The National Cholesterol Education Program (NCEP) has set the  following guidelines (reference ranges) for Cholesterol:  Optimal.....................<200 mg/dL  Borderline High.............200-239 mg/dL  High........................> or = 240 mg/dL      Triglycerides 08/28/2023 96  30 - 150 mg/dL Final    Comment: The National Cholesterol Education Program (NCEP) has set the  following guidelines (reference values) for  triglycerides:  Normal......................<150 mg/dL  Borderline High.............150-199 mg/dL  High........................200-499 mg/dL      HDL 08/28/2023 44  40 - 75 mg/dL Final    Comment: The National Cholesterol Education Program (NCEP) has set the  following guidelines (reference values) for HDL Cholesterol:  Low...............<40 mg/dL  Optimal...........>60 mg/dL      LDL Cholesterol 08/28/2023 92.8  63.0 - 159.0 mg/dL Final    Comment: The National Cholesterol Education Program (NCEP) has set the  following guidelines (reference values) for LDL Cholesterol:  Optimal.......................<130 mg/dL  Borderline High...............130-159 mg/dL  High..........................160-189 mg/dL  Very High.....................>190 mg/dL      HDL/Cholesterol Ratio 08/28/2023 28.2  20.0 - 50.0 % Final    Total Cholesterol/HDL Ratio 08/28/2023 3.5  2.0 - 5.0 Final    Non-HDL Cholesterol 08/28/2023 112  mg/dL Final    Comment: Risk category and Non-HDL cholesterol goals:  Coronary heart disease (CHD)or equivalent (10-year risk of CHD >20%):  Non-HDL cholesterol goal     <130 mg/dL  Two or more CHD risk factors and 10-year risk of CHD <= 20%:  Non-HDL cholesterol goal     <160 mg/dL  0 to 1 CHD risk factor:  Non-HDL cholesterol goal     <190 mg/dL      Hemoglobin A1C 08/28/2023 5.3  4.5 - 6.2 % Final    Comment: According to ADA guidelines, hemoglobin A1C <7.0% represents  optimal control in non-pregnant diabetic patients.  Different  metrics may apply to specific populations.   Standards of Medical Care in Diabetes - 2016.    For the purpose of screening for the presence of diabetes:  <5.7%     Consistent with the absence of diabetes  5.7-6.4%  Consistent with increasing risk for diabetes   (prediabetes)  >or=6.5%  Consistent with diabetes    Currently no consensus exists for use of hemoglobin A1C  for diagnosis of diabetes for children.      Estimated Avg Glucose 08/28/2023 105  68 - 131 mg/dL Final    ]  Assessment:       1. Pure hypercholesterolemia    2. Essential hypertension    3. Elevated glucose    4. Fatigue, unspecified type    5. Low serum sodium    6. Coronary artery disease involving native coronary artery of native heart, unspecified whether angina present        Plan:       Pure hypercholesterolemia  -     TSH; Future; Expected date: 09/14/2023  -     Basic Metabolic Panel; Future; Expected date: 09/14/2023    Essential hypertension  -     CBC Auto Differential; Future; Expected date: 09/14/2023  -     TSH; Future; Expected date: 09/14/2023  -     X-Ray Chest PA And Lateral; Future; Expected date: 09/14/2023    Elevated glucose  Improved    Fatigue, unspecified type  -     Vitamin B12; Future; Expected date: 09/14/2023  -     Vitamin D; Future; Expected date: 09/28/2023  -     X-Ray Chest PA And Lateral; Future; Expected date: 09/14/2023    Low serum sodium  -     Basic Metabolic Panel; Future; Expected date: 09/14/2023    Coronary artery disease involving native coronary artery of native heart, unspecified whether angina present  -     Ambulatory referral/consult to Cardiology; Future; Expected date: 09/21/2023        Follow up in about 3 months (around 12/14/2023) for fatigue .      9/14/2023 PHILLIP Rivera, FNP

## 2023-09-14 ENCOUNTER — OFFICE VISIT (OUTPATIENT)
Dept: FAMILY MEDICINE | Facility: CLINIC | Age: 63
End: 2023-09-14
Payer: MEDICAID

## 2023-09-14 VITALS
BODY MASS INDEX: 40.43 KG/M2 | SYSTOLIC BLOOD PRESSURE: 122 MMHG | WEIGHT: 315 LBS | HEART RATE: 61 BPM | DIASTOLIC BLOOD PRESSURE: 86 MMHG | OXYGEN SATURATION: 96 % | TEMPERATURE: 99 F | HEIGHT: 74 IN

## 2023-09-14 DIAGNOSIS — I10 ESSENTIAL HYPERTENSION: ICD-10-CM

## 2023-09-14 DIAGNOSIS — E78.00 PURE HYPERCHOLESTEROLEMIA: Primary | ICD-10-CM

## 2023-09-14 DIAGNOSIS — I25.10 CORONARY ARTERY DISEASE INVOLVING NATIVE CORONARY ARTERY OF NATIVE HEART, UNSPECIFIED WHETHER ANGINA PRESENT: ICD-10-CM

## 2023-09-14 DIAGNOSIS — R79.89 LOW SERUM SODIUM: ICD-10-CM

## 2023-09-14 DIAGNOSIS — R73.09 ELEVATED GLUCOSE: ICD-10-CM

## 2023-09-14 DIAGNOSIS — R53.83 FATIGUE, UNSPECIFIED TYPE: ICD-10-CM

## 2023-09-14 PROCEDURE — 1160F RVW MEDS BY RX/DR IN RCRD: CPT | Mod: CPTII,S$GLB,, | Performed by: NURSE PRACTITIONER

## 2023-09-14 PROCEDURE — 99214 OFFICE O/P EST MOD 30 MIN: CPT | Mod: S$GLB,,, | Performed by: NURSE PRACTITIONER

## 2023-09-14 PROCEDURE — 3079F PR MOST RECENT DIASTOLIC BLOOD PRESSURE 80-89 MM HG: ICD-10-PCS | Mod: CPTII,S$GLB,, | Performed by: NURSE PRACTITIONER

## 2023-09-14 PROCEDURE — 3008F PR BODY MASS INDEX (BMI) DOCUMENTED: ICD-10-PCS | Mod: CPTII,S$GLB,, | Performed by: NURSE PRACTITIONER

## 2023-09-14 PROCEDURE — 3044F PR MOST RECENT HEMOGLOBIN A1C LEVEL <7.0%: ICD-10-PCS | Mod: CPTII,S$GLB,, | Performed by: NURSE PRACTITIONER

## 2023-09-14 PROCEDURE — 3079F DIAST BP 80-89 MM HG: CPT | Mod: CPTII,S$GLB,, | Performed by: NURSE PRACTITIONER

## 2023-09-14 PROCEDURE — 3008F BODY MASS INDEX DOCD: CPT | Mod: CPTII,S$GLB,, | Performed by: NURSE PRACTITIONER

## 2023-09-14 PROCEDURE — 3074F SYST BP LT 130 MM HG: CPT | Mod: CPTII,S$GLB,, | Performed by: NURSE PRACTITIONER

## 2023-09-14 PROCEDURE — 99214 PR OFFICE/OUTPT VISIT, EST, LEVL IV, 30-39 MIN: ICD-10-PCS | Mod: S$GLB,,, | Performed by: NURSE PRACTITIONER

## 2023-09-14 PROCEDURE — 3044F HG A1C LEVEL LT 7.0%: CPT | Mod: CPTII,S$GLB,, | Performed by: NURSE PRACTITIONER

## 2023-09-14 PROCEDURE — 1159F MED LIST DOCD IN RCRD: CPT | Mod: CPTII,S$GLB,, | Performed by: NURSE PRACTITIONER

## 2023-09-14 PROCEDURE — 1159F PR MEDICATION LIST DOCUMENTED IN MEDICAL RECORD: ICD-10-PCS | Mod: CPTII,S$GLB,, | Performed by: NURSE PRACTITIONER

## 2023-09-14 PROCEDURE — 1160F PR REVIEW ALL MEDS BY PRESCRIBER/CLIN PHARMACIST DOCUMENTED: ICD-10-PCS | Mod: CPTII,S$GLB,, | Performed by: NURSE PRACTITIONER

## 2023-09-14 PROCEDURE — 3074F PR MOST RECENT SYSTOLIC BLOOD PRESSURE < 130 MM HG: ICD-10-PCS | Mod: CPTII,S$GLB,, | Performed by: NURSE PRACTITIONER

## 2023-09-14 RX ORDER — ASPIRIN 81 MG/1
TABLET ORAL
COMMUNITY
End: 2023-09-14

## 2023-09-14 RX ORDER — RIVAROXABAN 20 MG/1
TABLET, FILM COATED ORAL
COMMUNITY
Start: 2023-08-20

## 2023-09-22 ENCOUNTER — TELEPHONE (OUTPATIENT)
Dept: PEDIATRICS | Facility: CLINIC | Age: 63
End: 2023-09-22

## 2023-09-22 ENCOUNTER — HOSPITAL ENCOUNTER (OUTPATIENT)
Dept: RADIOLOGY | Facility: HOSPITAL | Age: 63
Discharge: HOME | End: 2023-09-22
Attending: NURSE PRACTITIONER
Payer: MEDICAID

## 2023-09-22 DIAGNOSIS — I10 ESSENTIAL HYPERTENSION: ICD-10-CM

## 2023-09-22 DIAGNOSIS — R53.83 FATIGUE, UNSPECIFIED TYPE: ICD-10-CM

## 2023-09-22 PROCEDURE — 71046 X-RAY EXAM CHEST 2 VIEWS: CPT | Mod: TC

## 2023-09-22 NOTE — TELEPHONE ENCOUNTER
Patient notified via phone of normal chest xray. I advised him we will contact him again when the lab results come back

## 2023-09-25 ENCOUNTER — TELEPHONE (OUTPATIENT)
Dept: FAMILY MEDICINE | Facility: CLINIC | Age: 63
End: 2023-09-25

## 2023-09-25 NOTE — TELEPHONE ENCOUNTER
----- Message from Eric Mercado NP sent at 9/25/2023  7:33 AM CDT -----  Electrolytes are improved B12 is low. He would benefit from B12 injections if he is agreeable, I can send them to the [facility]. All other labs ok

## 2023-09-27 ENCOUNTER — TELEPHONE (OUTPATIENT)
Dept: FAMILY MEDICINE | Facility: CLINIC | Age: 63
End: 2023-09-27

## 2023-09-27 DIAGNOSIS — E53.8 VITAMIN B 12 DEFICIENCY: Primary | ICD-10-CM

## 2023-09-27 RX ORDER — CYANOCOBALAMIN 1000 UG/ML
INJECTION, SOLUTION INTRAMUSCULAR; SUBCUTANEOUS
Qty: 10 ML | Refills: 0 | Status: SHIPPED | OUTPATIENT
Start: 2023-09-27

## 2023-09-27 NOTE — TELEPHONE ENCOUNTER
Pt's wife called states [facility]. Has not recieved anything for him to get his vitamin b12 injections.  Pharm. States they need a rx for the pt to get filled there in order for them to give it.

## 2023-10-29 DIAGNOSIS — E78.00 PURE HYPERCHOLESTEROLEMIA: ICD-10-CM

## 2023-10-30 RX ORDER — ATORVASTATIN CALCIUM 20 MG/1
TABLET, FILM COATED ORAL
Qty: 90 TABLET | Refills: 0 | Status: SHIPPED | OUTPATIENT
Start: 2023-10-30 | End: 2024-02-07

## 2023-11-30 ENCOUNTER — PATIENT MESSAGE (OUTPATIENT)
Dept: FAMILY MEDICINE | Facility: CLINIC | Age: 63
End: 2023-11-30

## 2023-11-30 DIAGNOSIS — E53.8 VITAMIN B 12 DEFICIENCY: Primary | ICD-10-CM

## 2024-02-07 DIAGNOSIS — E78.00 PURE HYPERCHOLESTEROLEMIA: ICD-10-CM

## 2024-02-07 RX ORDER — ATORVASTATIN CALCIUM 20 MG/1
TABLET, FILM COATED ORAL
Qty: 90 TABLET | Refills: 0 | Status: SHIPPED | OUTPATIENT
Start: 2024-02-07 | End: 2024-04-18

## 2024-04-04 ENCOUNTER — PATIENT MESSAGE (OUTPATIENT)
Dept: FAMILY MEDICINE | Facility: CLINIC | Age: 64
End: 2024-04-04
Payer: MEDICAID

## 2024-04-04 DIAGNOSIS — G89.29 CHRONIC PAIN OF RIGHT KNEE: Primary | ICD-10-CM

## 2024-04-04 DIAGNOSIS — M25.561 CHRONIC PAIN OF RIGHT KNEE: Primary | ICD-10-CM

## 2024-04-18 ENCOUNTER — OFFICE VISIT (OUTPATIENT)
Dept: ORTHOPEDICS | Facility: CLINIC | Age: 64
End: 2024-04-18
Payer: MEDICAID

## 2024-04-18 ENCOUNTER — PATIENT MESSAGE (OUTPATIENT)
Dept: ORTHOPEDICS | Facility: CLINIC | Age: 64
End: 2024-04-18
Payer: MEDICAID

## 2024-04-18 VITALS — HEIGHT: 74 IN | WEIGHT: 307 LBS | BODY MASS INDEX: 39.4 KG/M2

## 2024-04-18 DIAGNOSIS — M17.11 PRIMARY OSTEOARTHRITIS OF RIGHT KNEE: ICD-10-CM

## 2024-04-18 DIAGNOSIS — Z98.890 HISTORY OF ARTHROSCOPY OF RIGHT KNEE: ICD-10-CM

## 2024-04-18 DIAGNOSIS — M17.31 POST-TRAUMATIC OSTEOARTHRITIS OF RIGHT KNEE: Primary | ICD-10-CM

## 2024-04-18 PROCEDURE — 1160F RVW MEDS BY RX/DR IN RCRD: CPT | Mod: CPTII,S$GLB,, | Performed by: ORTHOPAEDIC SURGERY

## 2024-04-18 PROCEDURE — 99213 OFFICE O/P EST LOW 20 MIN: CPT | Mod: S$GLB,,, | Performed by: ORTHOPAEDIC SURGERY

## 2024-04-18 PROCEDURE — 1159F MED LIST DOCD IN RCRD: CPT | Mod: CPTII,S$GLB,, | Performed by: ORTHOPAEDIC SURGERY

## 2024-04-18 PROCEDURE — 3008F BODY MASS INDEX DOCD: CPT | Mod: CPTII,S$GLB,, | Performed by: ORTHOPAEDIC SURGERY

## 2024-04-18 RX ORDER — HYDROCHLOROTHIAZIDE 12.5 MG/1
12.5 TABLET ORAL DAILY
COMMUNITY

## 2024-04-18 RX ORDER — TRANEXAMIC ACID 10 MG/ML
1000 INJECTION, SOLUTION INTRAVENOUS
OUTPATIENT
Start: 2024-04-18

## 2024-04-18 RX ORDER — CEFAZOLIN SODIUM 2 G/50ML
2 SOLUTION INTRAVENOUS
OUTPATIENT
Start: 2024-04-18

## 2024-06-05 ENCOUNTER — TELEPHONE (OUTPATIENT)
Dept: FAMILY MEDICINE | Facility: CLINIC | Age: 64
End: 2024-06-05
Payer: MEDICAID

## 2024-06-05 DIAGNOSIS — E78.00 PURE HYPERCHOLESTEROLEMIA: ICD-10-CM

## 2024-06-05 DIAGNOSIS — R73.09 ELEVATED GLUCOSE: ICD-10-CM

## 2024-06-05 DIAGNOSIS — I10 ESSENTIAL HYPERTENSION: ICD-10-CM

## 2024-06-05 DIAGNOSIS — E53.8 VITAMIN B 12 DEFICIENCY: Primary | ICD-10-CM

## 2024-06-07 NOTE — TELEPHONE ENCOUNTER
No labs done with Dr. Thibodeaux in over 3 months.  Pt uses [facility] labs.

## 2024-06-10 ENCOUNTER — TELEPHONE (OUTPATIENT)
Dept: ORTHOPEDICS | Facility: CLINIC | Age: 64
End: 2024-06-10

## 2024-06-11 ENCOUNTER — PATIENT MESSAGE (OUTPATIENT)
Dept: FAMILY MEDICINE | Facility: CLINIC | Age: 64
End: 2024-06-11
Payer: MEDICAID

## 2024-06-12 ENCOUNTER — LAB VISIT (OUTPATIENT)
Dept: LAB | Facility: HOSPITAL | Age: 64
End: 2024-06-12
Attending: NURSE PRACTITIONER
Payer: MEDICAID

## 2024-06-12 DIAGNOSIS — R73.09 ELEVATED GLUCOSE: ICD-10-CM

## 2024-06-12 DIAGNOSIS — E53.8 VITAMIN B 12 DEFICIENCY: ICD-10-CM

## 2024-06-12 DIAGNOSIS — E78.00 PURE HYPERCHOLESTEROLEMIA: ICD-10-CM

## 2024-06-12 DIAGNOSIS — I10 ESSENTIAL HYPERTENSION: ICD-10-CM

## 2024-06-12 LAB
ALBUMIN SERPL BCP-MCNC: 4 G/DL (ref 3.5–5.2)
ALP SERPL-CCNC: 73 U/L (ref 55–135)
ALT SERPL W/O P-5'-P-CCNC: 13 U/L (ref 10–44)
ANION GAP SERPL CALC-SCNC: 8 MMOL/L (ref 8–16)
AST SERPL-CCNC: 11 U/L (ref 10–40)
BILIRUB SERPL-MCNC: 0.7 MG/DL (ref 0.1–1)
BUN SERPL-MCNC: 14 MG/DL (ref 8–23)
CALCIUM SERPL-MCNC: 9 MG/DL (ref 8.7–10.5)
CHLORIDE SERPL-SCNC: 102 MMOL/L (ref 95–110)
CHOLEST SERPL-MCNC: 219 MG/DL (ref 120–199)
CHOLEST/HDLC SERPL: 5 {RATIO} (ref 2–5)
CO2 SERPL-SCNC: 27 MMOL/L (ref 23–29)
CREAT SERPL-MCNC: 1.2 MG/DL (ref 0.5–1.4)
EST. GFR  (NO RACE VARIABLE): >60 ML/MIN/1.73 M^2
ESTIMATED AVG GLUCOSE: 117 MG/DL (ref 68–131)
GLUCOSE SERPL-MCNC: 105 MG/DL (ref 70–110)
HBA1C MFR BLD: 5.7 % (ref 4.5–6.2)
HDLC SERPL-MCNC: 44 MG/DL (ref 40–75)
HDLC SERPL: 20.1 % (ref 20–50)
LDLC SERPL CALC-MCNC: 149.4 MG/DL (ref 63–159)
NONHDLC SERPL-MCNC: 175 MG/DL
POTASSIUM SERPL-SCNC: 4.1 MMOL/L (ref 3.5–5.1)
PROT SERPL-MCNC: 7 G/DL (ref 6–8.4)
SODIUM SERPL-SCNC: 137 MMOL/L (ref 136–145)
TRIGL SERPL-MCNC: 128 MG/DL (ref 30–150)
VIT B12 SERPL-MCNC: 423 PG/ML (ref 210–950)

## 2024-06-12 PROCEDURE — 83036 HEMOGLOBIN GLYCOSYLATED A1C: CPT | Performed by: NURSE PRACTITIONER

## 2024-06-12 PROCEDURE — 80061 LIPID PANEL: CPT | Performed by: NURSE PRACTITIONER

## 2024-06-12 PROCEDURE — 80053 COMPREHEN METABOLIC PANEL: CPT | Performed by: NURSE PRACTITIONER

## 2024-06-12 PROCEDURE — 82607 VITAMIN B-12: CPT | Performed by: NURSE PRACTITIONER

## 2024-06-12 PROCEDURE — 36415 COLL VENOUS BLD VENIPUNCTURE: CPT | Performed by: NURSE PRACTITIONER

## 2024-06-14 ENCOUNTER — HOSPITAL ENCOUNTER (OUTPATIENT)
Dept: RADIOLOGY | Facility: HOSPITAL | Age: 64
Discharge: HOME | End: 2024-06-14
Attending: NURSE PRACTITIONER
Payer: MEDICAID

## 2024-06-14 ENCOUNTER — TELEPHONE (OUTPATIENT)
Dept: FAMILY MEDICINE | Facility: CLINIC | Age: 64
End: 2024-06-14

## 2024-06-14 ENCOUNTER — OFFICE VISIT (OUTPATIENT)
Dept: FAMILY MEDICINE | Facility: CLINIC | Age: 64
End: 2024-06-14
Payer: MEDICAID

## 2024-06-14 VITALS — HEIGHT: 74 IN | BODY MASS INDEX: 39.78 KG/M2 | WEIGHT: 310 LBS | HEART RATE: 64 BPM | OXYGEN SATURATION: 98 %

## 2024-06-14 DIAGNOSIS — E78.00 PURE HYPERCHOLESTEROLEMIA: Primary | ICD-10-CM

## 2024-06-14 DIAGNOSIS — R07.89 RIGHT-SIDED CHEST WALL PAIN: ICD-10-CM

## 2024-06-14 DIAGNOSIS — R73.09 ELEVATED GLUCOSE: ICD-10-CM

## 2024-06-14 DIAGNOSIS — Z01.818 PRE-OP EXAM: ICD-10-CM

## 2024-06-14 DIAGNOSIS — I10 ESSENTIAL HYPERTENSION: ICD-10-CM

## 2024-06-14 PROCEDURE — 3008F BODY MASS INDEX DOCD: CPT | Mod: CPTII,S$GLB,, | Performed by: NURSE PRACTITIONER

## 2024-06-14 PROCEDURE — 3044F HG A1C LEVEL LT 7.0%: CPT | Mod: CPTII,S$GLB,, | Performed by: NURSE PRACTITIONER

## 2024-06-14 PROCEDURE — 71100 X-RAY EXAM RIBS UNI 2 VIEWS: CPT | Mod: TC,PO,RT

## 2024-06-14 PROCEDURE — 1160F RVW MEDS BY RX/DR IN RCRD: CPT | Mod: CPTII,S$GLB,, | Performed by: NURSE PRACTITIONER

## 2024-06-14 PROCEDURE — 99214 OFFICE O/P EST MOD 30 MIN: CPT | Mod: S$GLB,,, | Performed by: NURSE PRACTITIONER

## 2024-06-14 PROCEDURE — 1159F MED LIST DOCD IN RCRD: CPT | Mod: CPTII,S$GLB,, | Performed by: NURSE PRACTITIONER

## 2024-06-14 PROCEDURE — 71100 X-RAY EXAM RIBS UNI 2 VIEWS: CPT | Mod: 26,RT,, | Performed by: RADIOLOGY

## 2024-06-14 PROCEDURE — 71046 X-RAY EXAM CHEST 2 VIEWS: CPT | Mod: TC,PO

## 2024-06-14 PROCEDURE — 71046 X-RAY EXAM CHEST 2 VIEWS: CPT | Mod: 26,,, | Performed by: RADIOLOGY

## 2024-06-14 RX ORDER — AMLODIPINE BESYLATE 2.5 MG/1
2.5 TABLET ORAL DAILY
Qty: 90 TABLET | Refills: 0 | Status: SHIPPED | OUTPATIENT
Start: 2024-06-14

## 2024-06-14 RX ORDER — ROSUVASTATIN CALCIUM 10 MG/1
10 TABLET, COATED ORAL DAILY
Qty: 90 TABLET | Refills: 0 | Status: SHIPPED | OUTPATIENT
Start: 2024-06-14

## 2024-06-14 NOTE — PROGRESS NOTES
SUBJECTIVE:      Patient ID: Otis Colón is a 63 y.o. male.    Chief Complaint: pain right side (Upper rt side pain x 1 month)    Mr Colón is here today to f/u on htn and hyperlipidemia. He is following with Dr Thibodeaux for cardiology. He will be having knee surgery with Dr Marie next month. He is no longer taking bp and chol meds. Says they made him feel bad and Dr Thibodeaux said it was ok to stop. Complaining of pain in his right axillary for several weeks, especially when reaching low.     Hypertension  This is a chronic problem. The current episode started more than 1 year ago. The problem is unchanged. The problem is controlled. Associated symptoms include chest pain (right chest wall). Pertinent negatives include no anxiety, headaches, neck pain, palpitations, peripheral edema or shortness of breath. Risk factors for coronary artery disease include obesity, male gender and dyslipidemia. Past treatments include diuretics, angiotensin blockers, calcium channel blockers and beta blockers. The current treatment provides moderate improvement. Compliance problems include exercise.    Hyperlipidemia  This is a chronic problem. The current episode started more than 1 year ago. The problem is controlled. Recent lipid tests were reviewed and are normal. Associated symptoms include chest pain (right chest wall). Pertinent negatives include no shortness of breath. He is currently on no antihyperlipidemic treatment. The current treatment provides significant improvement of lipids. Risk factors for coronary artery disease include hypertension, male sex, obesity and dyslipidemia.       Past Surgical History:   Procedure Laterality Date    APPENDECTOMY      CERVICAL FUSION  2009    CROSS FINGER FLAP      FRACTURE SURGERY      steel librado in right leg    KNEE ARTHROSCOPY Right     KNEE ARTHROSCOPY W/ MENISCECTOMY Right 12/14/2022    Procedure: ARTHROSCOPY, KNEE, WITH MENISCECTOMY;  Surgeon: Jeovany Marie MD;   Location: [facility] OR;  Service: Orthopedics;  Laterality: Right;    ORTHOPEDIC SURGERY Right     librado from knee to ankle    RHIZOTOMY  08/2016    SPINE SURGERY       Family History   Problem Relation Name Age of Onset    Hypertension Mother      No Known Problems Sister 1L/1D     No Known Problems Brother 3L/1D       Social History     Socioeconomic History    Marital status:    Tobacco Use    Smoking status: Never     Passive exposure: Past    Smokeless tobacco: Never   Substance and Sexual Activity    Alcohol use: No    Drug use: No    Sexual activity: Yes     Partners: Female     Social Determinants of Health     Financial Resource Strain: Low Risk  (6/10/2024)    Overall Financial Resource Strain (CARDIA)     Difficulty of Paying Living Expenses: Not very hard   Food Insecurity: No Food Insecurity (6/10/2024)    Hunger Vital Sign     Worried About Running Out of Food in the Last Year: Never true     Ran Out of Food in the Last Year: Never true   Transportation Needs: No Transportation Needs (9/6/2023)    Received from [facility] [place]    PRAPARE - Transportation     Lack of Transportation (Medical): No     Lack of Transportation (Non-Medical): No   Physical Activity: Insufficiently Active (6/10/2024)    Exercise Vital Sign     Days of Exercise per Week: 2 days     Minutes of Exercise per Session: 30 min   Stress: No Stress Concern Present (6/10/2024)    [employer] [place] [employer] - Occupational Stress Questionnaire     Feeling of Stress : Only a little   Housing Stability: Low Risk  (9/6/2023)    Received from [facility] [place]    Housing Stability Vital Sign     Unable to Pay for Housing in the Last Year: No     Number of Places Lived in the Last Year: 1     In the last 12 months, was there a time when you did not have a steady place to sleep or slept in a shelter (including now)?: No     Current Outpatient Medications   Medication Sig Dispense Refill    hydroCHLOROthiazide  (HYDRODIURIL) 12.5 MG Tab Take 12.5 mg by mouth once daily.      omeprazole (PRILOSEC) 40 MG capsule TAKE 1 CAPSULE(40 MG) BY MOUTH EVERY DAY 30 capsule 1    XARELTO 20 mg Tab TAKE 1 TABLET BY MOUTH DAILY WITH DINNER      amLODIPine (NORVASC) 2.5 MG tablet Take 1 tablet (2.5 mg total) by mouth once daily. 90 tablet 0    rosuvastatin (CRESTOR) 10 MG tablet Take 1 tablet (10 mg total) by mouth once daily. 90 tablet 0     No current facility-administered medications for this visit.     Review of patient's allergies indicates:  No Known Allergies   Past Medical History:   Diagnosis Date    A-fib     On eliquis//Dr Thibodeaux    Arthritis     Back pain     CAD (coronary artery disease) 01/13/2017    Cervical spinal stenosis     Digestive disorder     Hyperlipidemia     Hypertension     Migraine headache     Neck pain     TIA (transient ischemic attack)      Past Surgical History:   Procedure Laterality Date    APPENDECTOMY      CERVICAL FUSION  2009    CROSS FINGER FLAP      FRACTURE SURGERY      steel librado in right leg    KNEE ARTHROSCOPY Right     KNEE ARTHROSCOPY W/ MENISCECTOMY Right 12/14/2022    Procedure: ARTHROSCOPY, KNEE, WITH MENISCECTOMY;  Surgeon: Jeovany Marie MD;  Location: [facility];  Service: Orthopedics;  Laterality: Right;    ORTHOPEDIC SURGERY Right     librado from knee to ankle    RHIZOTOMY  08/2016    SPINE SURGERY         Review of Systems   Constitutional:  Negative for activity change, appetite change, chills, diaphoresis and unexpected weight change.   HENT:  Negative for ear discharge, facial swelling, hearing loss, nosebleeds, rhinorrhea and trouble swallowing.    Eyes:  Negative for photophobia, pain, discharge and visual disturbance.   Respiratory:  Negative for apnea, choking, chest tightness, shortness of breath and wheezing.    Cardiovascular:  Positive for chest pain (right chest wall). Negative for palpitations.   Gastrointestinal:  Negative for abdominal pain, blood in stool, constipation,  "diarrhea and vomiting.   Endocrine: Negative for polydipsia, polyphagia and polyuria.   Genitourinary:  Negative for difficulty urinating, hematuria and urgency.   Musculoskeletal:  Negative for arthralgias, gait problem, joint swelling and neck pain.   Skin:  Negative for pallor.   Neurological:  Negative for seizures, speech difficulty, weakness and headaches.   Hematological:  Does not bruise/bleed easily.   Psychiatric/Behavioral:  Negative for agitation, confusion and dysphoric mood.       OBJECTIVE:      Vitals:    06/14/24 0842   BP: (!) (P) 126/90   Pulse: 64   SpO2: 98%   Weight: (!) 140.6 kg (310 lb)   Height: 6' 2" (1.88 m)     Physical Exam  Vitals and nursing note reviewed.   Constitutional:       General: He is not in acute distress.     Appearance: He is well-developed.   HENT:      Head: Normocephalic and atraumatic.      Nose: Nose normal.      Mouth/Throat:      Pharynx: Uvula midline.   Eyes:      General: Lids are normal.      Conjunctiva/sclera: Conjunctivae normal.      Pupils: Pupils are equal, round, and reactive to light.      Right eye: Pupil is round and reactive.      Left eye: Pupil is round and reactive.   Neck:      Thyroid: No thyromegaly.      Vascular: No carotid bruit.   Cardiovascular:      Rate and Rhythm: Normal rate and regular rhythm.      Pulses: Normal pulses.      Heart sounds: Normal heart sounds.   Pulmonary:      Effort: Pulmonary effort is normal.      Breath sounds: Normal breath sounds. No wheezing, rhonchi or rales.   Chest:       Abdominal:      General: Bowel sounds are normal.      Palpations: Abdomen is soft. Abdomen is not rigid.      Tenderness: There is no abdominal tenderness.   Musculoskeletal:         General: Normal range of motion.      Cervical back: Normal range of motion and neck supple.      Right lower leg: No edema.      Left lower leg: No edema.   Lymphadenopathy:      Cervical: No cervical adenopathy.   Skin:     General: Skin is warm and dry.     "  Nails: There is no clubbing.   Neurological:      Mental Status: He is alert and oriented to person, place, and time.   Psychiatric:         Mood and Affect: Mood normal.         Speech: Speech normal.         Behavior: Behavior normal. Behavior is cooperative.         Thought Content: Thought content normal.         Judgment: Judgment normal.        Lab Visit on 06/12/2024   Component Date Value Ref Range Status    Sodium 06/12/2024 137  136 - 145 mmol/L Final    Potassium 06/12/2024 4.1  3.5 - 5.1 mmol/L Final    Chloride 06/12/2024 102  95 - 110 mmol/L Final    CO2 06/12/2024 27  23 - 29 mmol/L Final    Glucose 06/12/2024 105  70 - 110 mg/dL Final    BUN 06/12/2024 14  8 - 23 mg/dL Final    Creatinine 06/12/2024 1.2  0.5 - 1.4 mg/dL Final    Calcium 06/12/2024 9.0  8.7 - 10.5 mg/dL Final    Total Protein 06/12/2024 7.0  6.0 - 8.4 g/dL Final    Albumin 06/12/2024 4.0  3.5 - 5.2 g/dL Final    Total Bilirubin 06/12/2024 0.7  0.1 - 1.0 mg/dL Final    Comment: For infants and newborns, interpretation of results should be based  on gestational age, weight and in agreement with clinical  observations.    Premature Infant recommended reference ranges:  Up to 24 hours.............<8.0 mg/dL  Up to 48 hours............<12.0 mg/dL  3-5 days..................<15.0 mg/dL  6-29 days.................<15.0 mg/dL      Alkaline Phosphatase 06/12/2024 73  55 - 135 U/L Final    AST 06/12/2024 11  10 - 40 U/L Final    ALT 06/12/2024 13  10 - 44 U/L Final    eGFR 06/12/2024 >60.0  >60 mL/min/1.73 m^2 Final    Anion Gap 06/12/2024 8  8 - 16 mmol/L Final    Vitamin B-12 06/12/2024 423  210 - 950 pg/mL Final    Cholesterol 06/12/2024 219 (H)  120 - 199 mg/dL Final    Comment: The National Cholesterol Education Program (NCEP) has set the  following guidelines (reference ranges) for Cholesterol:  Optimal.....................<200 mg/dL  Borderline High.............200-239 mg/dL  High........................> or = 240 mg/dL       Triglycerides 06/12/2024 128  30 - 150 mg/dL Final    Comment: The National Cholesterol Education Program (NCEP) has set the  following guidelines (reference values) for triglycerides:  Normal......................<150 mg/dL  Borderline High.............150-199 mg/dL  High........................200-499 mg/dL      HDL 06/12/2024 44  40 - 75 mg/dL Final    Comment: The National Cholesterol Education Program (NCEP) has set the  following guidelines (reference values) for HDL Cholesterol:  Low...............<40 mg/dL  Optimal...........>60 mg/dL      LDL Cholesterol 06/12/2024 149.4  63.0 - 159.0 mg/dL Final    Comment: The National Cholesterol Education Program (NCEP) has set the  following guidelines (reference values) for LDL Cholesterol:  Optimal.......................<130 mg/dL  Borderline High...............130-159 mg/dL  High..........................160-189 mg/dL  Very High.....................>190 mg/dL      HDL/Cholesterol Ratio 06/12/2024 20.1  20.0 - 50.0 % Final    Total Cholesterol/HDL Ratio 06/12/2024 5.0  2.0 - 5.0 Final    Non-HDL Cholesterol 06/12/2024 175  mg/dL Final    Comment: Risk category and Non-HDL cholesterol goals:  Coronary heart disease (CHD)or equivalent (10-year risk of CHD >20%):  Non-HDL cholesterol goal     <130 mg/dL  Two or more CHD risk factors and 10-year risk of CHD <= 20%:  Non-HDL cholesterol goal     <160 mg/dL  0 to 1 CHD risk factor:  Non-HDL cholesterol goal     <190 mg/dL      Hemoglobin A1C 06/12/2024 5.7  4.5 - 6.2 % Final    Comment: According to ADA guidelines, hemoglobin A1C <7.0% represents  optimal control in non-pregnant diabetic patients.  Different  metrics may apply to specific populations.   Standards of Medical Care in Diabetes - 2016.    For the purpose of screening for the presence of diabetes:  <5.7%     Consistent with the absence of diabetes  5.7-6.4%  Consistent with increasing risk for diabetes   (prediabetes)  >or=6.5%  Consistent with  diabetes    Currently no consensus exists for use of hemoglobin A1C  for diagnosis of diabetes for children.      Estimated Avg Glucose 06/12/2024 117  68 - 131 mg/dL Final   ]  Last visit note, most recent available labs, and health maintenance reviewed    Assessment:       1. Pure hypercholesterolemia    2. Essential hypertension    3. Right-sided chest wall pain    4. Elevated glucose    5. Pre-op exam        Plan:       Pure hypercholesterolemia  -  start rosuvastatin (CRESTOR) 10 MG tablet; Take 1 tablet (10 mg total) by mouth once daily.  Dispense: 90 tablet; Refill: 0    Essential hypertension  -   start amLODIPine (NORVASC) 2.5 MG tablet; Take 1 tablet (2.5 mg total) by mouth once daily.  Dispense: 90 tablet; Refill: 0    Right-sided chest wall pain  -     X-Ray Ribs 2 View Right; Future; Expected date: 06/14/2024  -     X-Ray Chest PA And Lateral; Future; Expected date: 06/14/2024  -     US Soft Tissue Axilla, Right; Future; Expected date: 06/14/2024    Elevated glucose  Your labs are consistent with pre-diabetes. This means you are at risk for developing diabetes. I recommend eating low carb diet. Stay away from simple sugars.    Pre-op Exam  He will need cardiac clearance with Dr Thibodeaux    Follow up in about 2 months (around 8/14/2024) for htn.      6/14/2024 PHILLIP Rivera, FNP

## 2024-06-21 ENCOUNTER — HOSPITAL ENCOUNTER (OUTPATIENT)
Dept: RADIOLOGY | Facility: HOSPITAL | Age: 64
Discharge: HOME | End: 2024-06-21
Attending: NURSE PRACTITIONER
Payer: MEDICAID

## 2024-06-21 DIAGNOSIS — R07.89 RIGHT-SIDED CHEST WALL PAIN: ICD-10-CM

## 2024-06-21 PROCEDURE — 76604 US EXAM CHEST: CPT | Mod: TC,PO

## 2024-06-21 PROCEDURE — 76604 US EXAM CHEST: CPT | Mod: 26,,, | Performed by: RADIOLOGY

## 2024-06-24 ENCOUNTER — PATIENT MESSAGE (OUTPATIENT)
Dept: FAMILY MEDICINE | Facility: CLINIC | Age: 64
End: 2024-06-24
Payer: MEDICAID

## 2024-06-24 ENCOUNTER — TELEPHONE (OUTPATIENT)
Dept: FAMILY MEDICINE | Facility: CLINIC | Age: 64
End: 2024-06-24
Payer: MEDICAID

## 2024-06-24 NOTE — TELEPHONE ENCOUNTER
Pt said he is still having pain on the Rt side chest wall.  Pt notified u/s was normal.  He said he will give it a few weeks and if pain persist he will call us back

## 2024-06-24 NOTE — TELEPHONE ENCOUNTER
----- Message from Yenni Isbell sent at 6/24/2024 12:22 PM CDT -----  Returning Marybeth's call.  Pt's # [phone] GH

## 2024-06-27 ENCOUNTER — PATIENT MESSAGE (OUTPATIENT)
Dept: ORTHOPEDICS | Facility: CLINIC | Age: 64
End: 2024-06-27
Payer: MEDICAID

## 2024-07-15 ENCOUNTER — HOSPITAL ENCOUNTER (OUTPATIENT)
Dept: PREADMISSION TESTING | Facility: HOSPITAL | Age: 64
Discharge: HOME | End: 2024-07-15
Attending: ORTHOPAEDIC SURGERY
Payer: MEDICAID

## 2024-07-15 ENCOUNTER — HOSPITAL ENCOUNTER (OUTPATIENT)
Dept: RADIOLOGY | Facility: HOSPITAL | Age: 64
Discharge: HOME | End: 2024-07-15
Attending: ORTHOPAEDIC SURGERY
Payer: MEDICAID

## 2024-07-15 DIAGNOSIS — Z01.818 PREOP TESTING: Primary | ICD-10-CM

## 2024-07-15 DIAGNOSIS — M17.31 POST-TRAUMATIC OSTEOARTHRITIS OF RIGHT KNEE: ICD-10-CM

## 2024-07-15 DIAGNOSIS — Z01.818 PRE-OP TESTING: ICD-10-CM

## 2024-07-15 LAB
ANION GAP SERPL CALC-SCNC: 12 MMOL/L (ref 8–16)
APTT PPP: 45 SEC (ref 21–32)
BASOPHILS # BLD AUTO: 0.04 K/UL (ref 0–0.2)
BASOPHILS NFR BLD: 0.5 % (ref 0–1.9)
BUN SERPL-MCNC: 15 MG/DL (ref 8–23)
CALCIUM SERPL-MCNC: 9.8 MG/DL (ref 8.7–10.5)
CHLORIDE SERPL-SCNC: 102 MMOL/L (ref 95–110)
CO2 SERPL-SCNC: 25 MMOL/L (ref 23–29)
CREAT SERPL-MCNC: 1.2 MG/DL (ref 0.5–1.4)
DIFFERENTIAL METHOD BLD: ABNORMAL
EOSINOPHIL # BLD AUTO: 0.1 K/UL (ref 0–0.5)
EOSINOPHIL NFR BLD: 1.7 % (ref 0–8)
ERYTHROCYTE [DISTWIDTH] IN BLOOD BY AUTOMATED COUNT: 12.3 % (ref 11.5–14.5)
EST. GFR  (NO RACE VARIABLE): >60 ML/MIN/1.73 M^2
GLUCOSE SERPL-MCNC: 98 MG/DL (ref 70–110)
HCT VFR BLD AUTO: 43.1 % (ref 40–54)
HGB BLD-MCNC: 15.1 G/DL (ref 14–18)
IMM GRANULOCYTES # BLD AUTO: 0.01 K/UL (ref 0–0.04)
IMM GRANULOCYTES NFR BLD AUTO: 0.1 % (ref 0–0.5)
INR PPP: 1.2 (ref 0.8–1.2)
LYMPHOCYTES # BLD AUTO: 1.9 K/UL (ref 1–4.8)
LYMPHOCYTES NFR BLD: 22.9 % (ref 18–48)
MCH RBC QN AUTO: 31.7 PG (ref 27–31)
MCHC RBC AUTO-ENTMCNC: 35 G/DL (ref 32–36)
MCV RBC AUTO: 90 FL (ref 82–98)
MONOCYTES # BLD AUTO: 0.6 K/UL (ref 0.3–1)
MONOCYTES NFR BLD: 7.8 % (ref 4–15)
MRSA SCREEN BY PCR: NEGATIVE
NEUTROPHILS # BLD AUTO: 5.4 K/UL (ref 1.8–7.7)
NEUTROPHILS NFR BLD: 67 % (ref 38–73)
NRBC BLD-RTO: 0 /100 WBC
PLATELET # BLD AUTO: 358 K/UL (ref 150–450)
PMV BLD AUTO: 9 FL (ref 9.2–12.9)
POTASSIUM SERPL-SCNC: 3.9 MMOL/L (ref 3.5–5.1)
PROTHROMBIN TIME: 13 SEC (ref 9–12.5)
RBC # BLD AUTO: 4.77 M/UL (ref 4.6–6.2)
SODIUM SERPL-SCNC: 139 MMOL/L (ref 136–145)
WBC # BLD AUTO: 8.11 K/UL (ref 3.9–12.7)

## 2024-07-15 PROCEDURE — 85025 COMPLETE CBC W/AUTO DIFF WBC: CPT | Performed by: ORTHOPAEDIC SURGERY

## 2024-07-15 PROCEDURE — 73700 CT LOWER EXTREMITY W/O DYE: CPT | Mod: 26,RT,, | Performed by: RADIOLOGY

## 2024-07-15 PROCEDURE — 73700 CT LOWER EXTREMITY W/O DYE: CPT | Mod: TC,RT

## 2024-07-15 PROCEDURE — 87641 MR-STAPH DNA AMP PROBE: CPT | Performed by: ORTHOPAEDIC SURGERY

## 2024-07-15 PROCEDURE — 93005 ELECTROCARDIOGRAM TRACING: CPT

## 2024-07-15 PROCEDURE — 80048 BASIC METABOLIC PNL TOTAL CA: CPT | Performed by: ORTHOPAEDIC SURGERY

## 2024-07-15 PROCEDURE — 85730 THROMBOPLASTIN TIME PARTIAL: CPT | Performed by: ANESTHESIOLOGY

## 2024-07-15 PROCEDURE — 85610 PROTHROMBIN TIME: CPT | Performed by: ANESTHESIOLOGY

## 2024-07-15 PROCEDURE — 36415 COLL VENOUS BLD VENIPUNCTURE: CPT | Performed by: ANESTHESIOLOGY

## 2024-07-15 PROCEDURE — 93010 ELECTROCARDIOGRAM REPORT: CPT | Mod: ,,, | Performed by: INTERNAL MEDICINE

## 2024-07-15 NOTE — PRE ADMISSION SCREENING
"               [facility] Risk Assessment Scale    Patient Name: Otis Colón  YOB: 1960  MRN: [id number]            RIsk Factor Measure Recommendation Patient Data Scale/Score   BMI >40 Reconsider surgery, weight loss   Estimated body mass index is 39.8 kg/m² as calculated from the following:    Height as of 6/14/24: 6' 2" (1.88 m).    Weight as of 6/14/24: 140.6 kg (310 lb).   [] 0 = 1 - 24.9  [] 1 = 25-29.9  [] 2 = 30-34.9  [x] 3 = 35-39.9  [] 4 = 40-44.9  [] 5 = 45-99.9   Hemoglobin AIC (if applicable) >9 Delay surgery until DM under control  Refer for:  Nutrition Therapy  Exercise   Medication    Lab Results   Component Value Date    HGBA1C 5.7 06/12/2024       Lab Results   Component Value Date    GLU 98 07/15/2024      [x] 0 = 4.0-5.6  [] 1 = 5.7-6.4  [] 2 = 6.5-6.9  [] 3 = 7.0-7.9  [] 4 = 8.0-8.9  [] 5 = 9.0-12   Hemoglobin (Anemia) <9 Delay surgery   Correct anemia Lab Results   Component Value Date    HGB 15.1 07/15/2024    [] 20 - <9.0                    Albumin <3 Delay surgery &Workup Lab Results   Component Value Date    ALBUMIN 4.0 06/12/2024    [] 20 - <3.0   Smoking Cessation >4 Weeks Delay Surgery  Refer to OP Cessation Class    NA [] 20 - current smoker                                _____ PPD                    Hx of MI, PE, Arrhythmia, CVA, DVT <30 Days Delay Surgery     [] 20   TIA 4 years ago   Infection Variable Delay surgery and re-evaluate   NA [] 20 - recent/current infection     Depression (PHQ) >10 out of 27 Delay Surgery and re-evaluate  Medication  Counseling              [x] 0     []1     []2     []3      []4      [] 5                    (1-4)      (5-9)  (10-14)  (15-19)   (20-27)     Memory Impairment & Memory loss (Mini-Cog Screening Tool) Advanced dementia and/or Parkinson's Reconsider surgery     [x] 0     []1     []2     []3     []4     [] 5     Physical Conditioning (Modified AM-PAC Per Physical Therapy at [facility]) Unable to ambulate on day of surgery Delay " surgery and re-evaluate  Pre-Rehabilitation   (PT evaluation)       []  0   [x]4       []8     []12        []16     []20       (<20%)   (<40%)   (<60%)   (<80% )    (>80%)     Home Environment/Caregiver support  (Per /Navigator Interview)    Availability of basic services and/or approprate assistance during post-operative period Delay surgery and re-evaluate  Safe home environment  Health   1 week post-surgery  Transportation  availability  Ability to obtain DME/Medications post-op    [x] 0     []1     []2     []3     []4     [] 5  [x] 0     []1     []2     []3     []4     [] 5  [x] 0     []1     []2     []3     []4     [] 5  [x] 0     []1     []2     []3     []4     [] 5         MD Contact:  Comments:  Total Score:  7

## 2024-07-15 NOTE — DISCHARGE INSTRUCTIONS
To confirm, Your doctor has instructed you that surgery is scheduled for: 7/22/24    Please report to [facility], Registration the morning of surgery. You must check-in and receive a wristband before going to your procedure.  [address] [place] [address] [place] [id number]    Pre-Op will call the afternoon prior to surgery between 1:00 and 6:00 PM with the final arrival time.  Phone number: [phone]    PLEASE NOTE:  The surgery schedule has many variables which may affect the time of your surgery case.  Family members should be available if your surgery time changes.  Plan to be here the day of your procedure between 4-6 hours.    MEDICATIONS:  TAKE ONLY THESE MEDICATIONS WITH A SMALL SIP OF WATER THE MORNING OF YOUR PROCEDURE:    SEE MED LIST          DO NOT TAKE THESE MEDICATIONS 5-7 DAYS PRIOR to your procedure or per your surgeon's request:   ASPIRIN, ALEVE, ADVIL, IBUPROFEN, FISH OIL VITAMIN E, HERBALS  (May take Tylenol)    ONLY if you are prescribed any types of blood thinners such as:  Aspirin, Coumadin, Plavix, Pradaxa, Xarelto, Aggrenox, Effient, Eliquis, Savasya, Brilinta, or any other, ask your surgeon whether you should stop taking them and how long before surgery you should stop.  You may also need to verify with the prescribing physician if it is ok to stop your medication.      INSTRUCTIONS IMPORTANT!!  Do not eat or drink anything between midnight and the time of your procedure- this includes gum, mints, and candy.  Do not smoke or drink alcoholic beverages 24 hours prior to your procedure.  Shower the night before AND the morning of your procedure with a Chlorhexidine wash such as Hibiclens or Dial antibacterial soap from the neck down.  Do not get it on your face or in your eyes.  You may use your own shampoo and face wash. This helps your skin to be as bacteria free as possible.    If you wear contact lenses, dentures, hearing aids or glasses, bring a container to put them in  during surgery and give to a family member for safe keeping.  Please leave all jewelry, piercing's and valuables at home. You must remove your false eyelashes prior to surgery.    DO NOT remove hair from the surgery site.  Do not shave the incision site unless you are given specific instructions to do so.    ONLY if you have been diagnosed with sleep apnea please bring your C-PAP machine.  ONLY if you wear home oxygen please bring your portable oxygen tank the day of your procedure.  ONLY if you have a history of OPEN HEART SURGERY you will need a clearance from your Cardiologist per Anesthesia.      ONLY for patients requiring bowel prep, written instructions will be given by your doctor's office.  ONLY if you have a neuro stimulator, please bring the controller with you the morning of surgery  ONLY if a type and screen test is needed before surgery, please return:  If your doctor has scheduled you for an overnight stay, bring a small overnight bag with any personal items you need.  Make arrangements in advance for transportation home by a responsible adult. You can not go home in an uber or a cab per hospital policy.  It is not safe to drive a vehicle during the 24 hours after anesthesia.          All  facilities and properties are tobacco free.  Smoking is NOT allowed.   If you have any questions about these instructions, call Pre-Op Admit  Nursing at [phone] or the Pre-Op Day Surgery Unit at [phone].

## 2024-07-15 NOTE — PRE ADMISSION SCREENING
Patient Name: Otis Colón  YOB: 1960   MRN: [id number]     [facility]   Basic Mobility Inpatient Short Form 6 Clicks         How much difficulty does the patient currently have  Unable  A Lot  A Little  None      1. Turning over in bed (including adjusting bedclothes, sheets and blankets)?     1 []    2 []    3 [x]    4 []        2. Sitting down on and standing up from a chair with arms (e.g., wheelchair, bedside commode, etc.)     1 []  2 []  3 [x]     4 []      3. Moving from lying on back to sitting on the side of the bed?     1 []  2 []  3 []    4 [x]    How much help from another person does the patient currently need  Total  A Lot  A Little  None      4. Moving to and from a bed to a chair (including a wheelchair)?    1 []  2 []  3 [x]    4 []      5. Need to walk in hospital room?    1 []  2 []  3 []    4 [x]      6. Climbing 3-5 steps with a railing?    1 []  2 []  3 [x]    4 []       Raw Score:     20             CMS 0-100% Score:        35.83    %   Standardized Score:       46.67        CMS Modifier:          [facility]                                [facility]   Basic Mobility Inpatient Short Form 6 Clicks Score Conversion Table*         *Use this form to convert -PAC Basic Mobility Inpatient Raw Scores.   -[facility] Inpatient Basic Mobility Short Form Scoring Example   1. Add the number values associated with the response to each item. For example, items totals yield a Raw Score of 21.   2. Match the raw score to the t-Scale scores (t-Scale score = 50.25, SE = 4.69).   3. Find the associated CMS % (CMS % = 28.97%).   4. Locate the correct CMS Functional Modifier Code, or G Code (G code = CJ)     NOTE: Each -PAC Short Form has a separate conversion table. Make sure that you use the correct conversion table.       Instruction Manual - page 45 contains conversion table

## 2024-07-15 NOTE — PRE ADMISSION SCREENING
JOINT CAMP ASSESSMENT    Name Otis Colón   MRN [id number]    Age/Sex 63 y.o. male    Surgeon Frank   Joint Camp Date 7/15/2024   Surgery Date 7/22/24   Procedure Right TKA   Insurance Payor: MEDICAID / Plan: [employer] / Product Type: Managed Medicaid /    Care Team Patient Care Team:  Eric Mercado NP as PCP - General (Family Medicine)  Jono Thibodeaux MD as Consulting Physician (Cardiology)    Pharmacy   [id number] - [place], [place] - [address] [facility] [address]  [address]  [place] [address]  Phone: [phone] Fax: [phone]    [facility] [address] [address]  Phone: [phone] Fax: [phone]     AM-PAC Score   20   Risk Assessment Score 7     Past Medical History:   Diagnosis Date    A-fib     On eliquis//Dr Thibodeaux    Arthritis     Back pain     CAD (coronary artery disease) 01/13/2017    Cervical spinal stenosis     Digestive disorder     Hyperlipidemia     Hypertension     Migraine headache     Neck pain     TIA (transient ischemic attack)        Past Surgical History:   Procedure Laterality Date    APPENDECTOMY      CERVICAL FUSION  2009    CROSS FINGER FLAP      FRACTURE SURGERY      steel librado in right leg    KNEE ARTHROSCOPY Right     KNEE ARTHROSCOPY W/ MENISCECTOMY Right 12/14/2022    Procedure: ARTHROSCOPY, KNEE, WITH MENISCECTOMY;  Surgeon: Jeovany Marie MD;  Location: [facility];  Service: Orthopedics;  Laterality: Right;    ORTHOPEDIC SURGERY Right     librado from knee to ankle    RHIZOTOMY  08/2016    SPINE SURGERY           Home Enviroment     Living Arrangement: Lives with spouse and grandson girlfriend  Home Environment: 2-story house, can live on one level, bedroom on first  floor BR on first floor walk in shower and tub shower, elevated toilet seats  Home Safety Concerns: Pets in the home: dogs (1). Jake - cautioned about pets around fresh incision    DISCHARGE  CAREGIVER/SUPPORT SYSTEM     Identified post-op caregiver: Patient has spouse / significant other.  Patient's caregiver(s) will be able to provide physical assistance. Patient will have someone to assist overnight.      Caregiver present at pre-op interview:  No      PRE-OPERATIVE FUNCTIONAL STATUS     Employment: Employed full time dispatcher     Pre-op Functional Status: Patient is independent with mobility/ambulation, transfers, ADL's, IADL's.    Use of assistive device for ambulation: none  ADL: self care  ADL Limitations: difficulty with walking  Medical Restrictions: Unstable ambulation and Decreased range of motions in extremities    POTENTIAL BARRIERS TO DISCHARGE/POTENTIAL POST-OP COMPLICATIONS   CAD, hypertension, at fib, TIA no sequelae, heartburn    Video:watched at home - wife had TKA 2 years ago reviewed educational pamphlet      DISCHARGE PLAN     Expected LOS of 1 days or less for joint replacement discussed with patient. We also discussed a discharge path of HH for approximately two weeks with a transition to outpatient PT on the third week given no post-op complications.      Patient in agreement with discharge plan: Yes    Discharge to: Discharge home with home health (PT/OT) x2 weeks with transition to outpatient PT     HH:  insurance does not cover home health      OP PT: [facility] [address]     Home DME: rolling walker and bedside commode    Needed DME at D/C: none     Rx: Per Dr. Marie at discharge     Meds to Beds: Yes  Patient expected to discharge on Xarelto (Rivaroxaban) for DVT prophylaxis. Coumadin Clinic Needed: No

## 2024-07-16 ENCOUNTER — TELEPHONE (OUTPATIENT)
Dept: ORTHOPEDICS | Facility: CLINIC | Age: 64
End: 2024-07-16
Payer: MEDICAID

## 2024-07-16 DIAGNOSIS — M17.11 PRIMARY OSTEOARTHRITIS OF RIGHT KNEE: Primary | ICD-10-CM

## 2024-07-19 ENCOUNTER — ANESTHESIA EVENT (OUTPATIENT)
Dept: SURGERY | Facility: HOSPITAL | Age: 64
End: 2024-07-19
Payer: MEDICAID

## 2024-07-19 DIAGNOSIS — M17.11 PRIMARY OSTEOARTHRITIS OF RIGHT KNEE: Primary | ICD-10-CM

## 2024-07-19 RX ORDER — CELECOXIB 200 MG/1
200 CAPSULE ORAL 2 TIMES DAILY
Qty: 60 CAPSULE | Refills: 0 | Status: SHIPPED | OUTPATIENT
Start: 2024-07-19 | End: 2024-08-18

## 2024-07-19 RX ORDER — OXYCODONE AND ACETAMINOPHEN 7.5; 325 MG/1; MG/1
1 TABLET ORAL EVERY 6 HOURS PRN
Qty: 28 TABLET | Refills: 0 | Status: SHIPPED | OUTPATIENT
Start: 2024-07-19

## 2024-07-19 RX ORDER — DOCUSATE SODIUM 100 MG/1
100 CAPSULE, LIQUID FILLED ORAL 2 TIMES DAILY
Qty: 60 CAPSULE | Refills: 0 | Status: SHIPPED | OUTPATIENT
Start: 2024-07-19 | End: 2024-08-18

## 2024-07-19 RX ORDER — GABAPENTIN 300 MG/1
300 CAPSULE ORAL 2 TIMES DAILY
Qty: 60 CAPSULE | Refills: 0 | Status: SHIPPED | OUTPATIENT
Start: 2024-07-19 | End: 2024-08-18

## 2024-07-22 ENCOUNTER — HOSPITAL ENCOUNTER (OUTPATIENT)
Facility: HOSPITAL | Age: 64
Discharge: HOME | End: 2024-07-22
Attending: ORTHOPAEDIC SURGERY | Admitting: ORTHOPAEDIC SURGERY
Payer: MEDICAID

## 2024-07-22 ENCOUNTER — ANESTHESIA (OUTPATIENT)
Dept: SURGERY | Facility: HOSPITAL | Age: 64
End: 2024-07-22
Payer: MEDICAID

## 2024-07-22 DIAGNOSIS — M17.31 POST-TRAUMATIC OSTEOARTHRITIS OF RIGHT KNEE: ICD-10-CM

## 2024-07-22 DIAGNOSIS — M17.11 PRIMARY OSTEOARTHRITIS OF RIGHT KNEE: Primary | ICD-10-CM

## 2024-07-22 LAB
OHS QRS DURATION: 90 MS
OHS QTC CALCULATION: 429 MS

## 2024-07-22 PROCEDURE — C1769 GUIDE WIRE: HCPCS | Performed by: ORTHOPAEDIC SURGERY

## 2024-07-22 PROCEDURE — C1776 JOINT DEVICE (IMPLANTABLE): HCPCS | Performed by: ORTHOPAEDIC SURGERY

## 2024-07-22 PROCEDURE — 97110 THERAPEUTIC EXERCISES: CPT

## 2024-07-22 PROCEDURE — 25000003 PHARM REV CODE 250: Performed by: ANESTHESIOLOGY

## 2024-07-22 PROCEDURE — 27201423 OPTIME MED/SURG SUP & DEVICES STERILE SUPPLY: Performed by: ORTHOPAEDIC SURGERY

## 2024-07-22 PROCEDURE — 71000015 HC POSTOP RECOV 1ST HR: Performed by: ORTHOPAEDIC SURGERY

## 2024-07-22 PROCEDURE — 37000009 HC ANESTHESIA EA ADD 15 MINS: Performed by: ORTHOPAEDIC SURGERY

## 2024-07-22 PROCEDURE — D9220A PRA ANESTHESIA: Mod: CRNA,,, | Performed by: NURSE ANESTHETIST, CERTIFIED REGISTERED

## 2024-07-22 PROCEDURE — 27200688 HC TRAY, SPINAL-HYPER/ ISOBARIC: Performed by: ANESTHESIOLOGY

## 2024-07-22 PROCEDURE — 71000039 HC RECOVERY, EACH ADD'L HOUR: Performed by: ORTHOPAEDIC SURGERY

## 2024-07-22 PROCEDURE — 25000003 PHARM REV CODE 250: Performed by: NURSE ANESTHETIST, CERTIFIED REGISTERED

## 2024-07-22 PROCEDURE — 25000003 PHARM REV CODE 250: Performed by: ORTHOPAEDIC SURGERY

## 2024-07-22 PROCEDURE — C9290 INJ, BUPIVACAINE LIPOSOME: HCPCS | Performed by: ANESTHESIOLOGY

## 2024-07-22 PROCEDURE — C1713 ANCHOR/SCREW BN/BN,TIS/BN: HCPCS | Performed by: ORTHOPAEDIC SURGERY

## 2024-07-22 PROCEDURE — 36000712 HC OR TIME LEV V 1ST 15 MIN: Performed by: ORTHOPAEDIC SURGERY

## 2024-07-22 PROCEDURE — 63600175 PHARM REV CODE 636 W HCPCS: Mod: JZ,JG | Performed by: ANESTHESIOLOGY

## 2024-07-22 PROCEDURE — 71000033 HC RECOVERY, INTIAL HOUR: Performed by: ORTHOPAEDIC SURGERY

## 2024-07-22 PROCEDURE — 64447 NJX AA&/STRD FEMORAL NRV IMG: CPT | Performed by: ANESTHESIOLOGY

## 2024-07-22 PROCEDURE — 63600175 PHARM REV CODE 636 W HCPCS: Performed by: NURSE ANESTHETIST, CERTIFIED REGISTERED

## 2024-07-22 PROCEDURE — 0055T BONE SRGRY CMPTR CT/MRI IMAG: CPT | Mod: ,,, | Performed by: ORTHOPAEDIC SURGERY

## 2024-07-22 PROCEDURE — 63600175 PHARM REV CODE 636 W HCPCS: Performed by: ORTHOPAEDIC SURGERY

## 2024-07-22 PROCEDURE — 27447 TOTAL KNEE ARTHROPLASTY: CPT | Mod: RT,,, | Performed by: ORTHOPAEDIC SURGERY

## 2024-07-22 PROCEDURE — 63600175 PHARM REV CODE 636 W HCPCS

## 2024-07-22 PROCEDURE — 71000016 HC POSTOP RECOV ADDL HR: Performed by: ORTHOPAEDIC SURGERY

## 2024-07-22 PROCEDURE — D9220A PRA ANESTHESIA: Mod: ANES,,, | Performed by: ANESTHESIOLOGY

## 2024-07-22 PROCEDURE — 97116 GAIT TRAINING THERAPY: CPT

## 2024-07-22 PROCEDURE — 37000008 HC ANESTHESIA 1ST 15 MINUTES: Performed by: ORTHOPAEDIC SURGERY

## 2024-07-22 PROCEDURE — 97161 PT EVAL LOW COMPLEX 20 MIN: CPT

## 2024-07-22 PROCEDURE — 36000713 HC OR TIME LEV V EA ADD 15 MIN: Performed by: ORTHOPAEDIC SURGERY

## 2024-07-22 PROCEDURE — 27200665 HC NERVE BLOCK NEEDLE/ CATHETER: Performed by: ANESTHESIOLOGY

## 2024-07-22 DEVICE — PRIMARY TIBIAL BASEPLATE
Type: IMPLANTABLE DEVICE | Site: KNEE | Status: FUNCTIONAL
Brand: TRIATHLON

## 2024-07-22 DEVICE — SYMMETRIC PATELLA
Type: IMPLANTABLE DEVICE | Site: KNEE | Status: FUNCTIONAL
Brand: TRIATHLON

## 2024-07-22 DEVICE — CRUCIATE RETAINING FEMORAL
Type: IMPLANTABLE DEVICE | Site: KNEE | Status: FUNCTIONAL
Brand: TRIATHLON

## 2024-07-22 DEVICE — TIBIAL BEARING INSERT - CS
Type: IMPLANTABLE DEVICE | Site: KNEE | Status: FUNCTIONAL
Brand: TRIATHLON

## 2024-07-22 DEVICE — CEMENT BONE SIMPLEX HV RADPQ: Type: IMPLANTABLE DEVICE | Site: KNEE | Status: FUNCTIONAL

## 2024-07-22 RX ORDER — CELECOXIB 100 MG/1
200 CAPSULE ORAL 2 TIMES DAILY
Status: CANCELLED | OUTPATIENT
Start: 2024-07-22

## 2024-07-22 RX ORDER — LIDOCAINE HYDROCHLORIDE 10 MG/ML
1 INJECTION, SOLUTION EPIDURAL; INFILTRATION; INTRACAUDAL; PERINEURAL ONCE
Status: DISCONTINUED | OUTPATIENT
Start: 2024-07-22 | End: 2024-07-22 | Stop reason: HOSPADM

## 2024-07-22 RX ORDER — OXYCODONE HYDROCHLORIDE 10 MG/1
10 TABLET ORAL EVERY 4 HOURS PRN
Status: CANCELLED | OUTPATIENT
Start: 2024-07-22

## 2024-07-22 RX ORDER — OXYCODONE HCL 10 MG/1
10 TABLET, FILM COATED, EXTENDED RELEASE ORAL ONCE
Status: COMPLETED | OUTPATIENT
Start: 2024-07-22 | End: 2024-07-22

## 2024-07-22 RX ORDER — DEXTROSE MONOHYDRATE AND SODIUM CHLORIDE 5; .45 G/100ML; G/100ML
INJECTION, SOLUTION INTRAVENOUS CONTINUOUS
Status: CANCELLED | OUTPATIENT
Start: 2024-07-22

## 2024-07-22 RX ORDER — MIDAZOLAM HYDROCHLORIDE 1 MG/ML
INJECTION INTRAMUSCULAR; INTRAVENOUS
Status: DISCONTINUED | OUTPATIENT
Start: 2024-07-22 | End: 2024-07-22

## 2024-07-22 RX ORDER — MIDAZOLAM HYDROCHLORIDE 1 MG/ML
2 INJECTION, SOLUTION INTRAMUSCULAR; INTRAVENOUS
Status: DISCONTINUED | OUTPATIENT
Start: 2024-07-22 | End: 2024-07-22 | Stop reason: HOSPADM

## 2024-07-22 RX ORDER — TRANEXAMIC ACID 100 MG/ML
INJECTION, SOLUTION INTRAVENOUS
Status: DISCONTINUED | OUTPATIENT
Start: 2024-07-22 | End: 2024-07-22

## 2024-07-22 RX ORDER — CELECOXIB 100 MG/1
400 CAPSULE ORAL ONCE
Status: COMPLETED | OUTPATIENT
Start: 2024-07-22 | End: 2024-07-22

## 2024-07-22 RX ORDER — OXYCODONE HYDROCHLORIDE 5 MG/1
5 TABLET ORAL ONCE AS NEEDED
Status: COMPLETED | OUTPATIENT
Start: 2024-07-22 | End: 2024-07-22

## 2024-07-22 RX ORDER — FENTANYL CITRATE 50 UG/ML
25-200 INJECTION, SOLUTION INTRAMUSCULAR; INTRAVENOUS
Status: DISCONTINUED | OUTPATIENT
Start: 2024-07-22 | End: 2024-07-22 | Stop reason: HOSPADM

## 2024-07-22 RX ORDER — ONDANSETRON 4 MG/1
8 TABLET, ORALLY DISINTEGRATING ORAL EVERY 8 HOURS PRN
Status: CANCELLED | OUTPATIENT
Start: 2024-07-22

## 2024-07-22 RX ORDER — BUPIVACAINE HYDROCHLORIDE 7.5 MG/ML
INJECTION, SOLUTION EPIDURAL; RETROBULBAR
Status: COMPLETED | OUTPATIENT
Start: 2024-07-22 | End: 2024-07-22

## 2024-07-22 RX ORDER — MORPHINE SULFATE 2 MG/ML
2 INJECTION, SOLUTION INTRAMUSCULAR; INTRAVENOUS EVERY 4 HOURS PRN
Status: CANCELLED | OUTPATIENT
Start: 2024-07-22

## 2024-07-22 RX ORDER — ONDANSETRON HYDROCHLORIDE 2 MG/ML
INJECTION, SOLUTION INTRAVENOUS
Status: DISCONTINUED | OUTPATIENT
Start: 2024-07-22 | End: 2024-07-22

## 2024-07-22 RX ORDER — BUPIVACAINE HYDROCHLORIDE 5 MG/ML
INJECTION, SOLUTION EPIDURAL; INTRACAUDAL
Status: COMPLETED | OUTPATIENT
Start: 2024-07-22 | End: 2024-07-22

## 2024-07-22 RX ORDER — ZOLPIDEM TARTRATE 5 MG/1
5 TABLET ORAL NIGHTLY PRN
Status: CANCELLED | OUTPATIENT
Start: 2024-07-22

## 2024-07-22 RX ORDER — SODIUM CHLORIDE 0.9 % (FLUSH) 0.9 %
5 SYRINGE (ML) INJECTION
Status: DISCONTINUED | OUTPATIENT
Start: 2024-07-22 | End: 2024-07-22 | Stop reason: HOSPADM

## 2024-07-22 RX ORDER — DEXAMETHASONE SODIUM PHOSPHATE 4 MG/ML
INJECTION, SOLUTION INTRA-ARTICULAR; INTRALESIONAL; INTRAMUSCULAR; INTRAVENOUS; SOFT TISSUE
Status: DISCONTINUED | OUTPATIENT
Start: 2024-07-22 | End: 2024-07-22

## 2024-07-22 RX ORDER — LIDOCAINE HYDROCHLORIDE 20 MG/ML
INJECTION INTRAVENOUS
Status: DISCONTINUED | OUTPATIENT
Start: 2024-07-22 | End: 2024-07-22

## 2024-07-22 RX ORDER — PROPOFOL 10 MG/ML
VIAL (ML) INTRAVENOUS CONTINUOUS PRN
Status: DISCONTINUED | OUTPATIENT
Start: 2024-07-22 | End: 2024-07-22

## 2024-07-22 RX ORDER — BISACODYL 10 MG/1
10 SUPPOSITORY RECTAL DAILY
Status: CANCELLED | OUTPATIENT
Start: 2024-07-22

## 2024-07-22 RX ORDER — FAMOTIDINE 20 MG/1
20 TABLET, FILM COATED ORAL 2 TIMES DAILY
Status: CANCELLED | OUTPATIENT
Start: 2024-07-22

## 2024-07-22 RX ORDER — ACETAMINOPHEN 500 MG
1000 TABLET ORAL
Status: COMPLETED | OUTPATIENT
Start: 2024-07-22 | End: 2024-07-22

## 2024-07-22 RX ORDER — ONDANSETRON HYDROCHLORIDE 2 MG/ML
4 INJECTION, SOLUTION INTRAVENOUS ONCE AS NEEDED
Status: DISCONTINUED | OUTPATIENT
Start: 2024-07-22 | End: 2024-07-22 | Stop reason: HOSPADM

## 2024-07-22 RX ORDER — FENTANYL CITRATE 50 UG/ML
INJECTION, SOLUTION INTRAMUSCULAR; INTRAVENOUS
Status: DISCONTINUED | OUTPATIENT
Start: 2024-07-22 | End: 2024-07-22

## 2024-07-22 RX ORDER — POLYETHYLENE GLYCOL 3350 17 G/17G
17 POWDER, FOR SOLUTION ORAL DAILY
Status: CANCELLED | OUTPATIENT
Start: 2024-07-22

## 2024-07-22 RX ORDER — PHENYLEPHRINE HYDROCHLORIDE 10 MG/ML
INJECTION INTRAVENOUS
Status: DISCONTINUED | OUTPATIENT
Start: 2024-07-22 | End: 2024-07-22

## 2024-07-22 RX ORDER — FENTANYL CITRATE 50 UG/ML
25 INJECTION, SOLUTION INTRAMUSCULAR; INTRAVENOUS EVERY 5 MIN PRN
Status: DISCONTINUED | OUTPATIENT
Start: 2024-07-22 | End: 2024-07-22 | Stop reason: HOSPADM

## 2024-07-22 RX ADMIN — PHENYLEPHRINE HYDROCHLORIDE 100 MCG: 10 INJECTION INTRAVENOUS at 07:07

## 2024-07-22 RX ADMIN — ACETAMINOPHEN 1000 MG: 500 TABLET ORAL at 06:07

## 2024-07-22 RX ADMIN — CELECOXIB 400 MG: 100 CAPSULE ORAL at 06:07

## 2024-07-22 RX ADMIN — OXYCODONE HYDROCHLORIDE 10 MG: 10 TABLET, FILM COATED, EXTENDED RELEASE ORAL at 06:07

## 2024-07-22 RX ADMIN — BUPIVACAINE 20 ML: 13.3 INJECTION, SUSPENSION, LIPOSOMAL INFILTRATION at 06:07

## 2024-07-22 RX ADMIN — FENTANYL CITRATE 25 MCG: 50 INJECTION, SOLUTION INTRAMUSCULAR; INTRAVENOUS at 07:07

## 2024-07-22 RX ADMIN — FENTANYL CITRATE 50 MCG: 50 INJECTION, SOLUTION INTRAMUSCULAR; INTRAVENOUS at 06:07

## 2024-07-22 RX ADMIN — BUPIVACAINE HYDROCHLORIDE 10 ML: 5 INJECTION, SOLUTION EPIDURAL; INTRACAUDAL; PERINEURAL at 06:07

## 2024-07-22 RX ADMIN — TRANEXAMIC ACID 1000 MG: 100 INJECTION, SOLUTION INTRAVENOUS at 07:07

## 2024-07-22 RX ADMIN — PHENYLEPHRINE HYDROCHLORIDE 100 MCG: 10 INJECTION INTRAVENOUS at 08:07

## 2024-07-22 RX ADMIN — SODIUM CHLORIDE, SODIUM GLUCONATE, SODIUM ACETATE, POTASSIUM CHLORIDE AND MAGNESIUM CHLORIDE: 526; 502; 368; 37; 30 INJECTION, SOLUTION INTRAVENOUS at 06:07

## 2024-07-22 RX ADMIN — DEXAMETHASONE SODIUM PHOSPHATE 8 MG: 4 INJECTION, SOLUTION INTRA-ARTICULAR; INTRALESIONAL; INTRAMUSCULAR; INTRAVENOUS; SOFT TISSUE at 07:07

## 2024-07-22 RX ADMIN — CEFAZOLIN 2 G: 2 INJECTION, POWDER, FOR SOLUTION INTRAMUSCULAR; INTRAVENOUS at 07:07

## 2024-07-22 RX ADMIN — ROPIVACAINE HYDROCHLORIDE: 5 INJECTION, SOLUTION EPIDURAL; INFILTRATION; PERINEURAL at 08:07

## 2024-07-22 RX ADMIN — SODIUM CHLORIDE, SODIUM GLUCONATE, SODIUM ACETATE, POTASSIUM CHLORIDE AND MAGNESIUM CHLORIDE: 526; 502; 368; 37; 30 INJECTION, SOLUTION INTRAVENOUS at 08:07

## 2024-07-22 RX ADMIN — LIDOCAINE HYDROCHLORIDE 20 MG: 20 INJECTION, SOLUTION INTRAVENOUS at 07:07

## 2024-07-22 RX ADMIN — BUPIVACAINE HYDROCHLORIDE 1.5 ML: 7.5 INJECTION, SOLUTION EPIDURAL; RETROBULBAR at 07:07

## 2024-07-22 RX ADMIN — OXYCODONE 5 MG: 5 TABLET ORAL at 09:07

## 2024-07-22 RX ADMIN — ONDANSETRON 4 MG: 2 INJECTION INTRAMUSCULAR; INTRAVENOUS at 07:07

## 2024-07-22 RX ADMIN — PROPOFOL 50 MCG/KG/MIN: 10 INJECTION, EMULSION INTRAVENOUS at 07:07

## 2024-07-22 RX ADMIN — FENTANYL CITRATE 25 MCG: 50 INJECTION, SOLUTION INTRAMUSCULAR; INTRAVENOUS at 08:07

## 2024-07-22 RX ADMIN — MIDAZOLAM HYDROCHLORIDE 2 MG: 1 INJECTION INTRAMUSCULAR; INTRAVENOUS at 06:07

## 2024-07-22 NOTE — PT/OT/SLP EVAL
Physical Therapy Evaluation and Discharge Note    Patient Name:  Otis Colón   MRN:  [id number]    Recommendations:     Discharge Recommendations: Low Intensity Therapy  Discharge Equipment Recommendations: none   Barriers to discharge: None    Assessment:     Otis Colón is a 63 y.o. male admitted with a medical diagnosis of <principal problem not specified>. .  Pt seen at post 06 with  scheduled for discharge today. Pt alert and agreeable to PT. Pt completed thera ex in supine x 20 reps with slight LE's numbness. Pt requiring min assist to sit EOB with lightheadedness- encouraged breathing technique. Extra time to mobilize 250ft min assist with  another person following with chair and 1 seated rest.  Pt completed 1 threshold step training with VC for technique. OOb chair post PT. Pt and family provided with gait belt with education and demonstration  Spouse and daughter in law present entire session.    Recent Surgery: Procedure(s) (LRB):  ROBOTIC ARTHROPLASTY, KNEE, TOTAL, RIGHT (Right) Day of Surgery    Plan:     During this hospitalization, patient does not require further acute PT services.  Please re-consult if situation changes.      Subjective   Pt alert with spouse and daughter in law present  Pt stated that he is still employed and working from home  Spouse stated they have plenty of help- has 5 sons/G son  Chief Complaint: a little lightheaded as BP was a little low  Patient/Family Comments/goals: get well  Pain/Comfort:  Pain Rating 1: 0/10    Patients cultural, spiritual, Sabianism conflicts given the current situation:      Living Environment:  Home with spouse/ g son  Prior to admission, patients level of function was ambulatory/employed.  Equipment used at home: none.  DME owned (not currently used): rolling walker.  Upon discharge, patient will have assistance from family.    Objective:     Communicated with nurse Ness prior to session.  Patient found HOB elevated with   upon PT  entry to room.    General Precautions: Standard, fall    Orthopedic Precautions:RLE weight bearing as tolerated   Braces: N/A  Respiratory Status: Room air    Exams:  Postural Exam:  Patient presented with the following abnormalities:    -       Rounded shoulders  -       Forward head  -       BMI 39.16  RLE ROM: Deficits: RK flexion ~90*  RLE Strength: Deficits: 3/5  LLE ROM: WFL  LLE Strength: WFL    Functional Mobility:  Bed Mobility:     Rolling Right: minimum assistance  Scooting: minimum assistance  Supine to Sit: minimum assistance  Transfers:     Sit to Stand:  minimum assistance with rolling walker  Bed to Chair: minimum assistance with  rolling walker  using  Stand Pivot  Gait: 250ft with RW min assist and another person following with chair. Pt took 1 seated rest  Stairs:  Pt ascended/descended 1 threshold stair(s) with Rolling Walker with no handrails with Minimal Assistance.     AM-PAC 6 CLICK MOBILITY  Total Score:18       Treatment and Education:  Patient was educated on the importance of OOB activity and functional mobility to negate negative effects of prolonged bed rest during hospitalization, safe transfers and ambulation, and D/C planning   Thera ex with AP,QS/GS,SLR,HS x 10-20 reps  Gat at hallways with RW  RW adjusted to proper height  No urge to void at this time- offered bathroom use 2x    AM-PAC 6 CLICK MOBILITY  Total Score:18     Patient left up in chair with all lines intact, call button in reach, nurse Thi notified, and spouse/daughter in law present.    GOALS:   Multidisciplinary Problems       Physical Therapy Goals       Not on file                    History:     Past Medical History:   Diagnosis Date    A-fib     On nacho//Dr Thibodeaux    Arthritis     Back pain     CAD (coronary artery disease) 01/13/2017    Cervical spinal stenosis     Digestive disorder     Hyperlipidemia     Hypertension     Migraine headache     Neck pain     Sleep apnea     Stroke     TIA 2020    TIA  (transient ischemic attack)        Past Surgical History:   Procedure Laterality Date    APPENDECTOMY      CERVICAL FUSION  2009    CROSS FINGER FLAP      FRACTURE SURGERY      steel librado in right leg    KNEE ARTHROSCOPY Right     KNEE ARTHROSCOPY W/ MENISCECTOMY Right 12/14/2022    Procedure: ARTHROSCOPY, KNEE, WITH MENISCECTOMY;  Surgeon: Jeovany Marie MD;  Location: [facility];  Service: Orthopedics;  Laterality: Right;    ORTHOPEDIC SURGERY Right     librado from knee to ankle    RHIZOTOMY  08/2016    SPINE SURGERY         Time Tracking:     PT Received On: 07/22/24  PT Start Time: 1023     PT Stop Time: 1109  PT Total Time (min): 46 min     Billable Minutes: Evaluation 10, Gait Training 20, and Therapeutic Exercise 16      07/22/2024

## 2024-07-22 NOTE — TRANSFER OF CARE
"Anesthesia Transfer of Care Note    Patient: Otis Colón    Procedure(s) Performed: Procedure(s) (LRB):  ROBOTIC ARTHROPLASTY, KNEE, TOTAL, RIGHT (Right)    Patient location: PACU    Anesthesia Type: spinal    Transport from OR: Transported from OR on 6-10 L/min O2 by face mask with adequate spontaneous ventilation    Post pain: adequate analgesia    Post assessment: no apparent anesthetic complications    Post vital signs: stable    Level of consciousness: responds to stimulation and sedated    Nausea/Vomiting: no nausea/vomiting    Complications: none    Transfer of care protocol was followed      Last vitals: Visit Vitals  BP (!) 96/53   Pulse 70   Temp 37 °C (98.6 °F) (Skin)   Resp 12   Ht 6' 2" (1.88 m)   Wt (!) 138.3 kg (305 lb)   SpO2 (!) 90%   BMI 39.16 kg/m²     "

## 2024-07-22 NOTE — OP NOTE
[facility]  Surgery Department  Operative Note    SUMMARY     Date of Procedure: 7/22/2024     Procedure:  Right robotic total knee arthroplasty    Surgeons and Role:     * Jeovany Marie MD - Primary    Assisting Surgeon:  Teressa    Pre-Operative Diagnosis: Post-traumatic osteoarthritis of right knee [M17.31]    Post-Operative Diagnosis: Post-Op Diagnosis Codes:     * Post-traumatic osteoarthritis of right knee [M17.31]    Anesthesia: Spinal    Intraoperative Findings:  Bone-on-bone arthritis in the patellofemoral joint and the lateral compartment    Description of the Findings of the Procedure: Patient was placed on the operative table in the supine position.  The  knee was prepped and draped in the usual sterile manner for surgery.  An incision was made over the anterior aspect of the knee.  It was carried down sharply through the skin.  The medial parapatellar tissues were divided and the patella was taken laterally.  The effusion was aspirated.  The medial tibial plateau was taken down.  Two pins were placed just medial to the tibial tubercle for the proximal tibial array.  The tibial array was assembled.  We now placed the tibial checkpoint just medial to the tibial tubercle.  The knee was flexed to 90°.  Two pins were placed into the distal femur for the femoral array and the femoral array was assembled.  A femoral checkpoint was placed.  We now connected to the robotic system and determined the center of rotation of the hip.  The medial and lateral malleoli were identified.  The tibial and femoral check points were verified.  We now verified the femur and the tibia.  When this was completed the knee was brought into extension and the extension gap was determined and captured.  Similarly the flexion gap was determined and captured.  A robotic plan was created to balance the flexion-extension gap.  The plan was accepted.  When this was completed the robotic arm was brought into  position and the femur and the blade were both verified.  The cutting device was utilized and the femoral cuts were made.  Similarly the tibia was verified as was the tibial cutting blade and the tibial cut was made.  We now placed a femoral trial and the tibial trial.  We had full extension full flexion and completely balanced flexion-extension gaps.  This was determined both clinically and using the robotic measurements.  We now broached the tibia.  The patella was calibrated and cut and a button was placed.  The construct trialed perfectly with no lift-off.  We pulsed and irrigated.  We mixed bone cement and cemented 1st the tibia, next the femur and finally the patella.  All excess cement was removed at the time that we cemented and the construct was held in full extension until all the cement completely hardened.  We copiously irrigated again.  We closed the extensor mechanism with a combination of 2. FiberWire and a running Quill stitch.  We irrigated again and closed the subcu with 2-0 Stratafix.  We closed the skin with 4-0 Monocryl.  Sterile dressings were applied and the patient was noted to be in stable condition pending an x-ray and a neurovascular check.    Complications: No    Estimated Blood Loss (EBL): * No values recorded between 7/22/2024  7:36 AM and 7/22/2024  8:21 AM *           Implants:   Implant Name Type Inv. Item Serial No.  Lot No. LRB No. Used Action   CEMENT BONE SIMPLEX HV RADPQ - [id number]  CEMENT BONE SIMPLEX HV RADPQ  [employer] STEFANY.  Right 2 Implanted   PIN BONE [id number] - [id number]  PIN BONE [id number]  [employer] STEFANY.  Right 1 Implanted and Explanted   PIN FIXATION BONE 140X3.2MM - [id number]  PIN FIXATION BONE 140X3.2MM  [employer] STEFANY.  Right 1 Implanted and Explanted   FEMORAL CRUC RTN CIRILO SZ 6 RT - [id number]  FEMORAL CRUC RTN CIRILO SZ 6 RT  [employer] STEFANY. [id number] Right 1 Implanted   BASEPLATE TIB CIRILO PRIM SZ 7 - [id number]  BASEPLATE TIB CIRILO PRIM SZ  7  [employer] STEFANY. [id number] Right 1 Implanted   INSERT TIBIAL SZ 7 9MMX3 - [id number]  INSERT TIBIAL SZ 7 9MMX3  [employer] STEFANY. [id number] Right 1 Implanted   PATELLA TRI 36X10 X3 POLYETHYL - [id number]  PATELLA TRI 36X10 X3 POLYETHYL  [employer] STEFANY. TYLD Right 1 Implanted       Specimens:   Specimen (24h ago, onward)      None                    Condition: Good    Disposition: PACU - hemodynamically stable.              Discharge Note    SUMMARY     Admit Date: 7/22/2024    Discharge Date and Time:  07/22/2024 8:21 AM    Hospital Course (synopsis of major diagnoses, care, treatment, and services provided during the course of the hospital stay): Uneventful      Final Diagnosis: Post-Op Diagnosis Codes:     * Post-traumatic osteoarthritis of right knee [M17.31]    Disposition: Home or Self Care    Follow Up/Patient Instructions:     Medications:  Reconciled Home Medications:   Current Discharge Medication List        CONTINUE these medications which have NOT CHANGED    Details   amLODIPine (NORVASC) 2.5 MG tablet Take 1 tablet (2.5 mg total) by mouth once daily.  Qty: 90 tablet, Refills: 0    Comments: .  Associated Diagnoses: Essential hypertension      hydroCHLOROthiazide (HYDRODIURIL) 12.5 MG Tab Take 12.5 mg by mouth once daily.      rosuvastatin (CRESTOR) 10 MG tablet Take 1 tablet (10 mg total) by mouth once daily.  Qty: 90 tablet, Refills: 0    Associated Diagnoses: Pure hypercholesterolemia      celecoxib (CELEBREX) 200 MG capsule Take 1 capsule (200 mg total) by mouth 2 (two) times daily.  Qty: 60 capsule, Refills: 0    Associated Diagnoses: Primary osteoarthritis of right knee      docusate sodium (COLACE) 100 MG capsule Take 1 capsule (100 mg total) by mouth 2 (two) times daily.  Qty: 60 capsule, Refills: 0    Associated Diagnoses: Primary osteoarthritis of right knee      gabapentin (NEURONTIN) 300 MG capsule Take 1 capsule (300 mg total) by mouth 2 (two) times daily.  Qty: 60 capsule, Refills: 0     Associated Diagnoses: Primary osteoarthritis of right knee      omeprazole (PRILOSEC) 40 MG capsule TAKE 1 CAPSULE(40 MG) BY MOUTH EVERY DAY  Qty: 30 capsule, Refills: 1    Associated Diagnoses: Gastroesophageal reflux disease, unspecified whether esophagitis present      oxyCODONE-acetaminophen (PERCOCET) 7.5-325 mg per tablet Take 1 tablet by mouth every 6 (six) hours as needed for Pain.  Qty: 28 tablet, Refills: 0    Comments: This prescription and the attached 3 other prescriptions are for postoperative for the patient's scheduled total joint arthroplasty Monday July 22, 2024.  This is for the meds to bed program.  Associated Diagnoses: Primary osteoarthritis of right knee      XARELTO 20 mg Tab TAKE 1 TABLET BY MOUTH DAILY WITH DINNER           Discharge Procedure Orders   Diet general     Call MD for:  temperature >100.4     Call MD for:  persistent nausea and vomiting     Call MD for:  severe uncontrolled pain     Call MD for:  difficulty breathing, headache or visual disturbances     Call MD for:  redness, tenderness, or signs of infection (pain, swelling, redness, odor or green/yellow discharge around incision site)     Call MD for:  hives     Call MD for:  persistent dizziness or light-headedness     Call MD for:  extreme fatigue

## 2024-07-22 NOTE — PLAN OF CARE
Patient received from recovery at this time.  AAOX3.  NAD noted.  Dressing to right knee remains clean, dry and intact. Tolerating po intake well with no complaints of nausea/vomiting.  Patient able to move BLE with no problems noted.  Due to void.

## 2024-07-22 NOTE — ANESTHESIA PROCEDURE NOTES
Peripheral Block    Patient location during procedure: pre-op   Block not for primary anesthetic.  Reason for block: at surgeon's request and post-op pain management   Post-op Pain Location: Right knee pain   Start time: 7/22/2024 6:52 AM  Timeout: 7/22/2024 6:50 AM   End time: 7/22/2024 6:57 AM    Staffing  Authorizing Provider: Pablo Joy MD  Performing Provider: Pablo Joy MD    Staffing  Performed by: Pablo Joy MD  Authorized by: Pablo Joy MD    Preanesthetic Checklist  Completed: patient identified, IV checked, site marked, risks and benefits discussed, surgical consent, monitors and equipment checked, pre-op evaluation and timeout performed  Peripheral Block  Patient position: supine  Prep: ChloraPrep  Patient monitoring: heart rate, cardiac monitor, continuous pulse ox, continuous capnometry and frequent blood pressure checks  Block type: adductor canal  Laterality: right  Injection technique: single shot  Needle  Needle type: Stimuplex   Needle gauge: 21 G  Needle length: 4 in  Needle localization: anatomical landmarks and ultrasound guidance   -ultrasound image captured on disc.  Assessment  Injection assessment: negative aspiration, negative parasthesia and local visualized surrounding nerve  Paresthesia pain: none  Heart rate change: no  Slow fractionated injection: yes    Medications:    Medications: BUPivacaine liposome (PF) 1.3 % (13.3 mg/mL) suspension - Injection   20 mL - 7/22/2024 6:52:00 AM  bupivacaine (pf) (MARCAINE) injection 0.5% - Perineural   10 mL - 7/22/2024 6:52:00 AM    Additional Notes  VSS.  [facility] RN monitoring vitals throughout procedure.  Patient tolerated procedure well.

## 2024-07-22 NOTE — H&P
CC/Indication for Procedure: 63 y.o. male with Post-traumatic osteoarthritis of right knee [M17.31]  Primary osteoarthritis of right knee [M17.11].    Patient scheduled for [place] TOTAL KNEE ARTHROPLASTY [81755] (ROBOTIC ARTHROPLASTY, KNEE, TOTAL, RIGHT)  [place] TOTAL KNEE ARTHROPLASTY [[id number]].    Past Medical History:   Diagnosis Date    A-fib     On eliquis//Dr Thibodeaux    Arthritis     Back pain     CAD (coronary artery disease) 01/13/2017    Cervical spinal stenosis     Digestive disorder     Hyperlipidemia     Hypertension     Migraine headache     Neck pain     Sleep apnea     Stroke     TIA 2020    TIA (transient ischemic attack)      Past Surgical History:   Procedure Laterality Date    APPENDECTOMY      CERVICAL FUSION  2009    CROSS FINGER FLAP      FRACTURE SURGERY      steel librado in right leg    KNEE ARTHROSCOPY Right     KNEE ARTHROSCOPY W/ MENISCECTOMY Right 12/14/2022    Procedure: ARTHROSCOPY, KNEE, WITH MENISCECTOMY;  Surgeon: Jeovany Marie MD;  Location: [facility];  Service: Orthopedics;  Laterality: Right;    ORTHOPEDIC SURGERY Right     librado from knee to ankle    RHIZOTOMY  08/2016    SPINE SURGERY       Family History   Problem Relation Name Age of Onset    Hypertension Mother      No Known Problems Sister 1L/1D     No Known Problems Brother 3L/1D      Social History     Socioeconomic History    Marital status:    Tobacco Use    Smoking status: Never     Passive exposure: Past    Smokeless tobacco: Never   Substance and Sexual Activity    Alcohol use: No    Drug use: No    Sexual activity: Yes     Partners: Female     Social Determinants of Health     Financial Resource Strain: Low Risk  (6/10/2024)    Overall Financial Resource Strain (CARDIA)     Difficulty of Paying Living Expenses: Not very hard   Food Insecurity: No Food Insecurity (6/10/2024)    Hunger Vital Sign     Worried About Running Out of Food in the Last Year: Never true     Ran Out of Food in the Last Year: Never true   Transportation  Needs: No Transportation Needs (9/6/2023)    [facility]    PRAPARE - Transportation     Lack of Transportation (Medical): No     Lack of Transportation (Non-Medical): No   Physical Activity: Insufficiently Active (6/10/2024)    Exercise Vital Sign     Days of Exercise per Week: 2 days     Minutes of Exercise per Session: 30 min   Stress: No Stress Concern Present (6/10/2024)    [employer] [place] [employer] - Occupational Stress Questionnaire     Feeling of Stress : Only a little   Housing Stability: Low Risk  (9/6/2023)    Received from [facility], [facility]    Housing Stability Vital Sign     Unable to Pay for Housing in the Last Year: No     Number of Places Lived in the Last Year: 1     In the last 12 months, was there a time when you did not have a steady place to sleep or slept in a shelter (including now)?: No       Review of patient's allergies indicates:   Allergen Reactions    Amoxicillin Hives    Nsaids (non-steroidal anti-inflammatory drug) Other (See Comments)     CONTINUOUS HICCOUGHS FOR 3 DAYS---INJECTABLE STEROID INTO KNEE         Current Facility-Administered Medications:     0.9% NaCl 49.3 mL with ROPIvacaine 0.5% (PF) (NAROPIN) 49.3 mL, ketorolac (TORADOL) 30 mg, EPINEPHrine (ADRENALINE) 0.5 mg injection, , Other, Once, Jeovany Marie MD    ceFAZolin 2 g in D5W 50 mL IVPB (MB+), 2 g, Intravenous, On Call Procedure, Jeovany Marie MD    electrolyte-S (ISOLYTE), , Intravenous, Continuous, Phil Dean MD, Last Rate: 10 mL/hr at 07/22/24 0622, New Bag at 07/22/24 0622    fentaNYL 50 mcg/mL injection  mcg,  mcg, Intravenous, PRN, Phil Dean MD    LIDOcaine (PF) 10 mg/ml (1%) injection 10 mg, 1 mL, Intradermal, Once, Phil Dean MD    midazolam injection 2 mg, 2 mg, Intravenous, PRN, Phil Dean MD    tranexamic acid (CYKLOKAPRON) 1,000 mg in 0.9% NaCl 100 mL IVPB (MB+), 1,000 mg, Intravenous, On Call Procedure, Finger,  MD Jeovany    Facility-Administered Medications Ordered in Other Encounters:     fentaNYL 50 mcg/mL injection, , Intravenous, PRN, Demetrio Mcintyre RN, 50 mcg at 07/22/24 0650    midazolam (VERSED) 1 mg/mL injection, , Intravenous, PRN, Demetrio Mcintyre RN, 2 mg at 07/22/24 0650    ROS:    Denies chest pain or palpitations  Denies shortness of breath  Denies fevers or chills  Denies chest pain  Denies abdominal pain    PE:    General Appearance: Well nourished  Orientation: Oriented to time, place, person  Mental Status: Alert  Heart: RRR  Lungs: CTA  Abdomen: Soft and non-tender    Anesthesia/Surgery risks, benefits and alternative options discussed and understood by patient/family.    This note was created using Dragon voice recognition software that occasionally misinterpreted phrases or words.

## 2024-07-22 NOTE — ANESTHESIA POSTPROCEDURE EVALUATION
Anesthesia Post Evaluation    Patient: Otis Colón    Procedure(s) Performed: Procedure(s) (LRB):  ROBOTIC ARTHROPLASTY, KNEE, TOTAL, RIGHT (Right)    Final Anesthesia Type: spinal      Patient location during evaluation: PACU  Patient participation: Yes- Able to Participate  Level of consciousness: awake and alert  Post-procedure vital signs: reviewed and stable  Pain management: adequate  Airway patency: patent    PONV status at discharge: No PONV  Anesthetic complications: no      Cardiovascular status: blood pressure returned to baseline  Respiratory status: unassisted  Hydration status: euvolemic  Follow-up not needed.              Vitals Value Taken Time   /57 07/22/24 1115   Temp 36.6 °C (97.8 °F) 07/22/24 0940   Pulse 60 07/22/24 1115   Resp 16 07/22/24 1115   SpO2 100 % 07/22/24 1115         Event Time   Out of Recovery 09:50:00         Pain/Brendan Score: Pain Rating Prior to Med Admin: 4 (7/22/2024  9:06 AM)  Brendan Score: 10 (7/22/2024  9:45 AM)  Modified Brendan Score: 19 (7/22/2024 11:15 AM)

## 2024-07-22 NOTE — ANESTHESIA PREPROCEDURE EVALUATION
07/22/2024  Otis Colón is a 63 y.o., male.      Pre-op Assessment    I have reviewed the Patient Summary Reports.     I have reviewed the Nursing Notes. I have reviewed the NPO Status.   I have reviewed the Medications.     Review of Systems  Anesthesia Hx:  No problems with previous Anesthesia                Cardiovascular:     Hypertension   CAD      Angina           Cardiovascular Symptoms: Angina       Coronary Artery Disease:                            Hypertension     Atrial Fibrillation     Pulmonary:        Sleep Apnea     Obstructive Sleep Apnea (BLANKA).           Musculoskeletal:         Spine Disorders: cervical            Neurological:  TIA, CVA   Headaches      Dx of Headaches              CVA - Cerebrovasular Accident     TIA - Transient Ischemic Attack               Endocrine:        Obesity / BMI > 30      Physical Exam  General: Well nourished    Airway:  Mallampati: III   Mouth Opening: Normal  TM Distance: Normal  Neck ROM: Extension Decreased        Anesthesia Plan  Type of Anesthesia, risks & benefits discussed:    Anesthesia Type: Spinal, Regional  Intra-op Monitoring Plan: Standard ASA Monitors  Post Op Pain Control Plan: multimodal analgesia and peripheral nerve block  Informed Consent: Informed consent signed with the Patient and all parties understand the risks and agree with anesthesia plan.  All questions answered.   ASA Score: 3    Ready For Surgery From Anesthesia Perspective.     .

## 2024-07-22 NOTE — ANESTHESIA PROCEDURE NOTES
Spinal    Diagnosis: knee pain right  Patient location during procedure: OR  Start time: 7/22/2024 7:09 AM  Timeout: 7/22/2024 7:07 AM  End time: 7/22/2024 7:12 AM    Staffing  Authorizing Provider: Pablo Joy MD  Performing Provider: Pablo Joy MD    Staffing  Performed by: Pablo Joy MD  Authorized by: Pablo Joy MD    Preanesthetic Checklist  Completed: patient identified, IV checked, site marked, risks and benefits discussed, surgical consent, monitors and equipment checked, pre-op evaluation and timeout performed  Spinal Block  Patient position: sitting  Prep: ChloraPrep  Patient monitoring: cardiac monitor, continuous pulse ox and continuous capnometry  Approach: right paramedian  Location: L3-4  Injection technique: single shot  CSF Fluid: clear free-flowing CSF  Needle  Needle type: pencil-tip   Needle gauge: 22 G  Needle length: 3.5 in  Needle localization: anatomical landmarks  Medications:    Medications: bupivacaine (pf) (MARCAINE) injection 0.75% - Intraspinal   1.5 mL - 7/22/2024 7:09:00 AM

## 2024-07-22 NOTE — PLAN OF CARE
Patient prepared for surgery. Surgical and anesthesia consents signed. Patient belongings in preop. Text message notifications set up with significant other. Call light within reach, bed in lowest position.

## 2024-07-22 NOTE — PLAN OF CARE
Patient cleared by PT to discharge to home.  Patient able to void with no problems noted.  Dressing to right knee remains clean dry and intact.  Medications delivered to bedside and reviewed with patient and family.  Discharge instructions given to pt and family/friend, verbalized understanding and questions answered. Handouts provided. Belongings given back to pt. IV removed- catheter intact. Discharge via wheelchair.

## 2024-07-23 ENCOUNTER — OFFICE VISIT (OUTPATIENT)
Dept: ORTHOPEDICS | Facility: CLINIC | Age: 64
End: 2024-07-23
Payer: MEDICAID

## 2024-07-23 VITALS
DIASTOLIC BLOOD PRESSURE: 57 MMHG | SYSTOLIC BLOOD PRESSURE: 106 MMHG | HEART RATE: 60 BPM | RESPIRATION RATE: 16 BRPM | TEMPERATURE: 98 F | WEIGHT: 305 LBS | HEIGHT: 74 IN | BODY MASS INDEX: 39.14 KG/M2 | OXYGEN SATURATION: 100 %

## 2024-07-23 VITALS — BODY MASS INDEX: 39.13 KG/M2 | WEIGHT: 304.88 LBS | OXYGEN SATURATION: 98 % | HEIGHT: 74 IN | RESPIRATION RATE: 18 BRPM

## 2024-07-23 DIAGNOSIS — Z96.651 S/P TOTAL KNEE ARTHROPLASTY, RIGHT: Primary | ICD-10-CM

## 2024-07-23 PROCEDURE — 99999 PR PBB SHADOW E&M-EST. PATIENT-LVL III: CPT | Mod: PBBFAC,,, | Performed by: ORTHOPAEDIC SURGERY

## 2024-07-23 PROCEDURE — 3044F HG A1C LEVEL LT 7.0%: CPT | Mod: CPTII,,, | Performed by: ORTHOPAEDIC SURGERY

## 2024-07-23 PROCEDURE — 1160F RVW MEDS BY RX/DR IN RCRD: CPT | Mod: CPTII,,, | Performed by: ORTHOPAEDIC SURGERY

## 2024-07-23 PROCEDURE — 1159F MED LIST DOCD IN RCRD: CPT | Mod: CPTII,,, | Performed by: ORTHOPAEDIC SURGERY

## 2024-07-23 PROCEDURE — 99024 POSTOP FOLLOW-UP VISIT: CPT | Mod: ,,, | Performed by: ORTHOPAEDIC SURGERY

## 2024-07-23 PROCEDURE — 99213 OFFICE O/P EST LOW 20 MIN: CPT | Mod: PBBFAC,PN | Performed by: ORTHOPAEDIC SURGERY

## 2024-07-23 NOTE — PROGRESS NOTES
[facility] POST-OP NOTE    Subjective:           Chief Complaint:   Chief Complaint   Patient presents with    Right Knee - Post-op Evaluation     POD 1: R TKA 7/22/24, doing well, took a pain pill last night and was able to sleep, has swelling       Past Medical History:   Diagnosis Date    A-fib     On elimaribel//Dr Thibodeaux    Arthritis     Back pain     CAD (coronary artery disease) 01/13/2017    Cervical spinal stenosis     Digestive disorder     Hyperlipidemia     Hypertension     Migraine headache     Neck pain     Sleep apnea     Stroke     TIA 2020    TIA (transient ischemic attack)        Past Surgical History:   Procedure Laterality Date    APPENDECTOMY      CERVICAL FUSION  2009    CROSS FINGER FLAP      FRACTURE SURGERY      steel librado in right leg    KNEE ARTHROSCOPY Right     KNEE ARTHROSCOPY W/ MENISCECTOMY Right 12/14/2022    Procedure: ARTHROSCOPY, KNEE, WITH MENISCECTOMY;  Surgeon: Jeovany Marie MD;  Location: [facility];  Service: Orthopedics;  Laterality: Right;    ORTHOPEDIC SURGERY Right     librado from knee to ankle    RHIZOTOMY  08/2016    SPINE SURGERY         Current Outpatient Medications   Medication Sig    amLODIPine (NORVASC) 2.5 MG tablet Take 1 tablet (2.5 mg total) by mouth once daily.    celecoxib (CELEBREX) 200 MG capsule Take 1 capsule (200 mg total) by mouth 2 (two) times daily.    docusate sodium (COLACE) 100 MG capsule Take 1 capsule (100 mg total) by mouth 2 (two) times daily.    gabapentin (NEURONTIN) 300 MG capsule Take 1 capsule (300 mg total) by mouth 2 (two) times daily.    hydroCHLOROthiazide (HYDRODIURIL) 12.5 MG Tab Take 12.5 mg by mouth once daily.    omeprazole (PRILOSEC) 40 MG capsule TAKE 1 CAPSULE(40 MG) BY MOUTH EVERY DAY    oxyCODONE-acetaminophen (PERCOCET) 7.5-325 mg per tablet Take 1 tablet by mouth every 6 (six) hours as needed for Pain.    rosuvastatin (CRESTOR) 10 MG tablet Take 1 tablet (10 mg total) by mouth once daily.    XARELTO 20 mg Tab TAKE 1  TABLET BY MOUTH DAILY WITH DINNER     No current facility-administered medications for this visit.       Review of patient's allergies indicates:   Allergen Reactions    Amoxicillin Hives    Corticosteroids (glucocorticoids)        Family History   Problem Relation Name Age of Onset    Hypertension Mother      No Known Problems Sister 1L/1D     No Known Problems Brother 3L/1D        Social History     Socioeconomic History    Marital status:    Tobacco Use    Smoking status: Never     Passive exposure: Past    Smokeless tobacco: Never   Substance and Sexual Activity    Alcohol use: No    Drug use: No    Sexual activity: Yes     Partners: Female     Social Determinants of Health     Financial Resource Strain: Low Risk  (6/10/2024)    Overall Financial Resource Strain (CARDIA)     Difficulty of Paying Living Expenses: Not very hard   Food Insecurity: No Food Insecurity (6/10/2024)    Hunger Vital Sign     Worried About Running Out of Food in the Last Year: Never true     Ran Out of Food in the Last Year: Never true   Transportation Needs: No Transportation Needs (9/6/2023)    Received from [facility], [facility]    PRAPARE - Transportation     Lack of Transportation (Medical): No     Lack of Transportation (Non-Medical): No   Physical Activity: Insufficiently Active (6/10/2024)    Exercise Vital Sign     Days of Exercise per Week: 2 days     Minutes of Exercise per Session: 30 min   Stress: No Stress Concern Present (6/10/2024)    [employer] - Occupational Stress Questionnaire     Feeling of Stress : Only a little   Housing Stability: Low Risk  (9/6/2023)    Received from [facility], [facility]    Housing Stability Vital Sign     Unable to Pay for Housing in the Last Year: No     Number of Places Lived in the Last Year: 1     In the last 12 months, was there a time when you did not have a steady place to sleep or slept in a shelter (including now)?: No       History of present  illness:  Patient returns today status post total knee arthroplasty.  Denies any chest pain or shortness of breath.  Denies any calf pain.  Pain is well controlled.      Review of Systems:    Musculoskeletal:  See HPI      Objective:        Physical Examination:    Vital Signs:    Vitals:    07/23/24 0742   Resp: 18       Body mass index is 39.15 kg/m².    This a well-developed, well nourished patient in no acute distress.  They are alert and oriented and cooperative to examination.      Wounds are clean dry and intact.  Calf is soft.  Sensation is preserved.  Quad is active.          Assessment/Plan:      Stable postop day 1.  I went over the postoperative care with the patient in great detail.  We spoke about anticoagulation, pain control and a bowel program.  Patient clearly understands.  We will follow-up in approximately 10 days for suture removal or sooner if they develop increasing pain drainage calf pain or significant constipation.    At the time of your surgery you were prescribed a combination of various medications which may have included up to six prescriptions:      1. First prescription was for an anticoagulant.  Most commonly  aspirin 81 mg to be taken every 12 hours for 30 days postoperatively for DVT prophylaxis.  If you took aspirin prior to the start of this medication you can resume your normal aspirin dosing per your prescribing provider after 30 days.    If you were already on an anticoagulant then continue this medication as directed daily.    2. Second prescription was for a narcotic pain medication (Percocet 7.5 mg or similar) with instructions to take 1 tablet by mouth every 6 hours as needed for pain. Over the next 3 months, we will continue to taper your medications decreasing both the frequency and strength of the medications over time.    3. Third prescription was for an anti-inflammatory, Celebrex 200 mg with instructions to take 1 tab by mouth as needed every 12 hours for 1 month  "postoperatively.     4. Fourth prescription is for Neurontin 300 mg to be taken as needed 1 tablet by mouth every 12 hours for 1 month postoperatively.    5. Fifth prescription was for Colace 100 mg to be taken as needed every 12 hours for constipation associated with narcotic use.    6.  A Sixth and final prescription may be an antiemetic such as Zofran to be taken as needed for postop nausea.    Wound Care review: on approximately day 3 after surgery, or 72 hours after your initial surgery date, you may begin cleaning your wounds (AS LONG AS THERE IS NO DRAINAGE PRESENT).     Gentle cleansing with a mild soap and water is recommended. Pat the area dry, and then cover the wound with a clean, sterile dressing.  Do this at least once daily.  Do not apply any ointments creams or lotions to the wounds until told to do so.  Avoid submerging or immersing the wounds in water such as a sink, tub, or pool for at least 1 month after surgery.    Phong Seth, Physician Assistant, served in the capacity as a "scribe" for this patient encounter.  A "face-to-face" encounter occurred with Dr. Jeovany Marie on this date.  The treatment plan and medical decision-making is outlined above. Patient was seen and examined with a chaperone.      This note was created using Dragon voice recognition software that occasionally misinterpreted phrases or words.        "

## 2024-07-30 DIAGNOSIS — M17.11 PRIMARY OSTEOARTHRITIS OF RIGHT KNEE: ICD-10-CM

## 2024-07-30 RX ORDER — OXYCODONE AND ACETAMINOPHEN 7.5; 325 MG/1; MG/1
1 TABLET ORAL EVERY 6 HOURS PRN
Qty: 28 TABLET | Refills: 0 | Status: SHIPPED | OUTPATIENT
Start: 2024-07-30

## 2024-08-05 ENCOUNTER — OFFICE VISIT (OUTPATIENT)
Dept: ORTHOPEDICS | Facility: CLINIC | Age: 64
End: 2024-08-05
Payer: MEDICAID

## 2024-08-05 VITALS — BODY MASS INDEX: 39.14 KG/M2 | WEIGHT: 305 LBS | HEIGHT: 74 IN

## 2024-08-05 DIAGNOSIS — Z96.651 S/P TOTAL KNEE ARTHROPLASTY, RIGHT: Primary | ICD-10-CM

## 2024-08-05 PROCEDURE — 1159F MED LIST DOCD IN RCRD: CPT | Mod: CPTII,,, | Performed by: PHYSICIAN ASSISTANT

## 2024-08-05 PROCEDURE — 99999 PR PBB SHADOW E&M-EST. PATIENT-LVL III: CPT | Mod: PBBFAC,,, | Performed by: PHYSICIAN ASSISTANT

## 2024-08-05 PROCEDURE — 3044F HG A1C LEVEL LT 7.0%: CPT | Mod: CPTII,,, | Performed by: PHYSICIAN ASSISTANT

## 2024-08-05 PROCEDURE — 99024 POSTOP FOLLOW-UP VISIT: CPT | Mod: ,,, | Performed by: PHYSICIAN ASSISTANT

## 2024-08-05 PROCEDURE — 99213 OFFICE O/P EST LOW 20 MIN: CPT | Mod: PBBFAC,PN | Performed by: PHYSICIAN ASSISTANT

## 2024-08-05 RX ORDER — SULFAMETHOXAZOLE AND TRIMETHOPRIM 800; 160 MG/1; MG/1
1 TABLET ORAL 2 TIMES DAILY
Qty: 20 TABLET | Refills: 0 | Status: SHIPPED | OUTPATIENT
Start: 2024-08-05 | End: 2024-08-15

## 2024-08-05 RX ORDER — HYDROCODONE BITARTRATE AND ACETAMINOPHEN 7.5; 325 MG/1; MG/1
1 TABLET ORAL EVERY 8 HOURS PRN
Qty: 21 TABLET | Refills: 0 | Status: SHIPPED | OUTPATIENT
Start: 2024-08-05 | End: 2024-08-12

## 2024-08-11 ENCOUNTER — PATIENT MESSAGE (OUTPATIENT)
Dept: FAMILY MEDICINE | Facility: CLINIC | Age: 64
End: 2024-08-11
Payer: MEDICAID

## 2024-08-13 ENCOUNTER — OFFICE VISIT (OUTPATIENT)
Dept: ORTHOPEDICS | Facility: CLINIC | Age: 64
End: 2024-08-13
Payer: MEDICAID

## 2024-08-13 VITALS — BODY MASS INDEX: 39.14 KG/M2 | HEIGHT: 74 IN | WEIGHT: 305 LBS

## 2024-08-13 DIAGNOSIS — Z96.651 S/P TOTAL KNEE ARTHROPLASTY, RIGHT: Primary | ICD-10-CM

## 2024-08-13 PROCEDURE — 1160F RVW MEDS BY RX/DR IN RCRD: CPT | Mod: CPTII,,, | Performed by: ORTHOPAEDIC SURGERY

## 2024-08-13 PROCEDURE — 99024 POSTOP FOLLOW-UP VISIT: CPT | Mod: ,,, | Performed by: ORTHOPAEDIC SURGERY

## 2024-08-13 PROCEDURE — 1159F MED LIST DOCD IN RCRD: CPT | Mod: CPTII,,, | Performed by: ORTHOPAEDIC SURGERY

## 2024-08-13 PROCEDURE — 99213 OFFICE O/P EST LOW 20 MIN: CPT | Mod: PBBFAC,PN | Performed by: ORTHOPAEDIC SURGERY

## 2024-08-13 PROCEDURE — 99999 PR PBB SHADOW E&M-EST. PATIENT-LVL III: CPT | Mod: PBBFAC,,, | Performed by: ORTHOPAEDIC SURGERY

## 2024-08-13 PROCEDURE — 3044F HG A1C LEVEL LT 7.0%: CPT | Mod: CPTII,,, | Performed by: ORTHOPAEDIC SURGERY

## 2024-08-13 NOTE — PROGRESS NOTES
[facility]    Subjective:     Chief Complaint:   Chief Complaint   Patient presents with    Right Knee - Post-op Evaluation     Patient is here for a one week f/u and wound check, PO right TKA 7.22.24. States pain has stayed about the same since last visit. Has popping and grinding as well as feeling of wanting to give way. Also has numbness and tingling        Past Medical History:   Diagnosis Date    A-fib     On eliquis//Dr Thibodeaux    Arthritis     Back pain     CAD (coronary artery disease) 01/13/2017    Cervical spinal stenosis     Digestive disorder     Hyperlipidemia     Hypertension     Migraine headache     Neck pain     Sleep apnea     Stroke     TIA 2020    TIA (transient ischemic attack)        Past Surgical History:   Procedure Laterality Date    APPENDECTOMY      CERVICAL FUSION  2009    CROSS FINGER FLAP      FRACTURE SURGERY      steel librado in right leg    KNEE ARTHROSCOPY Right     KNEE ARTHROSCOPY W/ MENISCECTOMY Right 12/14/2022    Procedure: ARTHROSCOPY, KNEE, WITH MENISCECTOMY;  Surgeon: Jeovany Marie MD;  Location: [facility] OR;  Service: Orthopedics;  Laterality: Right;    ORTHOPEDIC SURGERY Right     librado from knee to ankle    RHIZOTOMY  08/2016    ROBOTIC ARTHROPLASTY, KNEE Right 7/22/2024    Procedure: ROBOTIC ARTHROPLASTY, KNEE, TOTAL;  Surgeon: Jeovany Marie MD;  Location: [facility];  Service: Orthopedics;  Laterality: Right;  [facility]    SPINE SURGERY         Current Outpatient Medications   Medication Sig    amLODIPine (NORVASC) 2.5 MG tablet Take 1 tablet (2.5 mg total) by mouth once daily.    celecoxib (CELEBREX) 200 MG capsule Take 1 capsule (200 mg total) by mouth 2 (two) times daily.    docusate sodium (COLACE) 100 MG capsule Take 1 capsule (100 mg total) by mouth 2 (two) times daily.    gabapentin (NEURONTIN) 300 MG capsule Take 1 capsule (300 mg total) by mouth 2 (two) times daily.    hydroCHLOROthiazide (HYDRODIURIL) 12.5 MG Tab Take 12.5 mg by mouth once daily.     HYDROcodone-acetaminophen (NORCO) 7.5-325 mg per tablet Take 1 tablet by mouth every 8 (eight) hours as needed for Pain.    omeprazole (PRILOSEC) 40 MG capsule TAKE 1 CAPSULE(40 MG) BY MOUTH EVERY DAY    rosuvastatin (CRESTOR) 10 MG tablet Take 1 tablet (10 mg total) by mouth once daily.    sulfamethoxazole-trimethoprim 800-160mg (BACTRIM DS) 800-160 mg Tab Take 1 tablet by mouth 2 (two) times daily. for 10 days    XARELTO 20 mg Tab TAKE 1 TABLET BY MOUTH DAILY WITH DINNER     No current facility-administered medications for this visit.       Review of patient's allergies indicates:   Allergen Reactions    Amoxicillin Hives    Corticosteroids (glucocorticoids)        Family History   Problem Relation Name Age of Onset    Hypertension Mother      No Known Problems Sister 1L/1D     No Known Problems Brother 3L/1D        Social History     Socioeconomic History    Marital status:    Tobacco Use    Smoking status: Never     Passive exposure: Past    Smokeless tobacco: Never   Substance and Sexual Activity    Alcohol use: No    Drug use: No    Sexual activity: Yes     Partners: Female     Social Determinants of Health     Financial Resource Strain: Low Risk  (6/10/2024)    Overall Financial Resource Strain (CARDIA)     Difficulty of Paying Living Expenses: Not very hard   Food Insecurity: No Food Insecurity (6/10/2024)    Hunger Vital Sign     Worried About Running Out of Food in the Last Year: Never true     Ran Out of Food in the Last Year: Never true   Transportation Needs: No Transportation Needs (9/6/2023)    Received [facility]    PRAPARE - Transportation     Lack of Transportation (Medical): No     Lack of Transportation (Non-Medical): No   Physical Activity: Insufficiently Active (6/10/2024)    Exercise Vital Sign     Days of Exercise per Week: 2 days     Minutes of Exercise per Session: 30 min   Stress: No Stress Concern Present (6/10/2024)    [employer] -  Occupational Stress Questionnaire     Feeling of Stress : Only a little   Housing Stability: Low Risk  (9/6/2023)    Received from [facility]    Housing Stability Vital Sign     Unable to Pay for Housing in the Last Year: No     Number of Places Lived in the Last Year: 1     In the last 12 months, was there a time when you did not have a steady place to sleep or slept in a shelter (including now)?: No       History of present illness: 63 y.o. male returns to clinic today status post Right knee arthroplasty July 22nd.  Patient is here today for routine follow-up.     He has a small area of wound dehiscence, questionable suture abscess.  We started her on some Bactrim he is here today for wound check.      Review of Systems:    Constitution: Negative for chills, fever, and sweats.  Negative for unexplained weight loss.    HENT:  Negative for headaches and blurry vision.    Cardiovascular:Negative for chest pain or irregular heart beat. Negative for hypertension.    Respiratory:  Negative for cough and shortness of breath.    Gastrointestinal: Negative for abdominal pain, heartburn, melena, nausea, and vomitting.    Genitourinary:  Negative bladder incontinence and dysuria.    Musculoskeletal:  See HPI for details.     Neurological: Negative for numbness.    Psychiatric/Behavioral: Negative for depression.  The patient is not nervous/anxious.      Endocrine: Negative for polyuria    Hematologic/Lymphatic: Negative for bleeding problem.  Does not bruise/bleed easily.    Skin: Negative for poor would healing and rash    Objective:      Physical Examination:    Vital Signs:  There were no vitals filed for this visit.    Body mass index is 39.16 kg/m².    This a well-developed, well nourished patient in no acute distress.  They are alert and oriented and cooperative to examination.        Examination of the right knee, skin is dry and intact, no erythema or ecchymosis.  The erythema noted last week has  "significantly improved.  No drainage is noted.  Small area of dehiscence is decreasing.  Homans signs negative, straight leg raise negative.    Range motion 0-110  .  Pertinent New Results:    XRAY Report / Interpretation:       Assessment/Plan:      Stable status post right total knee arthroplasty 3 weeks ago.  Patient with small wound complication.  Responded well to conservative treatment.  He will finish his Bactrim.  Wound care was again discussed in great detail.  No hot tubs no swimming pools known tub baths.  He will follow up with us in 1-2 weeks for another wound check.    Phong Seth, Physician Assistant, served in the capacity as a "scribe" for this patient encounter.  A "face-to-face" encounter occurred with Dr. Jeovany Marie on this date.  The treatment plan and medical decision-making is outlined above. Patient was seen and examined with a chaperone.       This note was created using Dragon voice recognition software that occasionally misinterpreted phrases or words.          "

## 2024-08-13 NOTE — PROGRESS NOTES
SUBJECTIVE:      Patient ID: Otis Colón is a 63 y.o. male.    Chief Complaint: Hypertension    Mr Colón is here today to f/u on htn and hyperlipidemia. He is following with Dr Thibodeaux for cardiology. He had a knee replacement with Dr Marie last month. He had a wound complication and is on bactrim, improved. He is going to PT. He is back on his chol and bp meds. Will be due for repeat lipids    Hypertension  This is a chronic problem. The current episode started more than 1 year ago. The problem is unchanged. The problem is controlled. Associated symptoms include peripheral edema. Pertinent negatives include no anxiety, blurred vision, chest pain, headaches, malaise/fatigue, neck pain, orthopnea, palpitations, PND, shortness of breath or sweats. Risk factors for coronary artery disease include obesity, male gender and dyslipidemia. Past treatments include diuretics, angiotensin blockers, calcium channel blockers and beta blockers. The current treatment provides significant improvement. Compliance problems include exercise.    Hyperlipidemia  This is a chronic problem. The current episode started more than 1 year ago. The problem is controlled. Recent lipid tests were reviewed and are normal. Pertinent negatives include no chest pain or shortness of breath. He is currently on no antihyperlipidemic treatment. The current treatment provides significant improvement of lipids. Risk factors for coronary artery disease include hypertension, male sex, obesity and dyslipidemia.       Past Surgical History:   Procedure Laterality Date    APPENDECTOMY      CERVICAL FUSION  2009    CROSS FINGER FLAP      FRACTURE SURGERY      steel librado in right leg    KNEE ARTHROSCOPY Right     KNEE ARTHROSCOPY W/ MENISCECTOMY Right 12/14/2022    Procedure: ARTHROSCOPY, KNEE, WITH MENISCECTOMY;  Surgeon: Jeovany Marie MD;  Location: [facility];  Service: Orthopedics;  Laterality: Right;    ORTHOPEDIC SURGERY Right     librado from knee  to ankle    RHIZOTOMY  08/2016    ROBOTIC ARTHROPLASTY, KNEE Right 7/22/2024    Procedure: ROBOTIC ARTHROPLASTY, KNEE, TOTAL;  Surgeon: Jeovany Marie MD;  Location: [facility];  Service: Orthopedics;  Laterality: Right;  [place]    SPINE SURGERY       Family History   Problem Relation Name Age of Onset    Hypertension Mother      No Known Problems Sister 1L/1D     No Known Problems Brother 3L/1D       Social History     Socioeconomic History    Marital status:    Tobacco Use    Smoking status: Never     Passive exposure: Past    Smokeless tobacco: Never   Substance and Sexual Activity    Alcohol use: No    Drug use: No    Sexual activity: Yes     Partners: Female     Social Determinants of Health     Financial Resource Strain: Low Risk  (6/10/2024)    Overall Financial Resource Strain (CARDIA)     Difficulty of Paying Living Expenses: Not very hard   Food Insecurity: No Food Insecurity (6/10/2024)    Hunger Vital Sign     Worried About Running Out of Food in the Last Year: Never true     Ran Out of Food in the Last Year: Never true   Transportation Needs: No Transportation Needs (9/6/2023)    Received from [facility]    PRAPARE - Transportation     Lack of Transportation (Medical): No     Lack of Transportation (Non-Medical): No   Physical Activity: Insufficiently Active (6/10/2024)    Exercise Vital Sign     Days of Exercise per Week: 2 days     Minutes of Exercise per Session: 30 min   Stress: No Stress Concern Present (6/10/2024)    [employer] - Occupational Stress Questionnaire     Feeling of Stress : Only a little   Housing Stability: Low Risk  (9/6/2023)    Received from [facility]    Housing Stability Vital Sign     Unable to Pay for Housing in the Last Year: No     Number of Places Lived in the Last Year: 1     In the last 12 months, was there a time when you did not have a steady place to sleep or slept in a shelter (including now)?: No      Current Outpatient Medications   Medication Sig Dispense Refill    celecoxib (CELEBREX) 200 MG capsule Take 1 capsule (200 mg total) by mouth 2 (two) times daily. 60 capsule 0    docusate sodium (COLACE) 100 MG capsule Take 1 capsule (100 mg total) by mouth 2 (two) times daily. 60 capsule 0    hydroCHLOROthiazide (HYDRODIURIL) 12.5 MG Tab Take 12.5 mg by mouth once daily.      HYDROcodone-acetaminophen (NORCO) 7.5-325 mg per tablet Take 1 tablet by mouth every 8 (eight) hours as needed for Pain. 21 tablet 0    omeprazole (PRILOSEC) 40 MG capsule TAKE 1 CAPSULE(40 MG) BY MOUTH EVERY DAY 30 capsule 1    sulfamethoxazole-trimethoprim 800-160mg (BACTRIM DS) 800-160 mg Tab Take 1 tablet by mouth 2 (two) times daily. for 10 days 20 tablet 0    XARELTO 20 mg Tab TAKE 1 TABLET BY MOUTH DAILY WITH DINNER      amLODIPine (NORVASC) 2.5 MG tablet Take 1 tablet (2.5 mg total) by mouth once daily. 90 tablet 1    rosuvastatin (CRESTOR) 10 MG tablet Take 1 tablet (10 mg total) by mouth once daily. 90 tablet 1     No current facility-administered medications for this visit.     Review of patient's allergies indicates:   Allergen Reactions    Amoxicillin Hives    Corticosteroids (glucocorticoids)       Past Medical History:   Diagnosis Date    A-fib     On nacho//Dr Thibodeaux    Arthritis     Back pain     CAD (coronary artery disease) 01/13/2017    Cervical spinal stenosis     Digestive disorder     Hyperlipidemia     Hypertension     Migraine headache     Neck pain     Sleep apnea     Stroke     TIA 2020    TIA (transient ischemic attack)      Past Surgical History:   Procedure Laterality Date    APPENDECTOMY      CERVICAL FUSION  2009    CROSS FINGER FLAP      FRACTURE SURGERY      steel librado in right leg    KNEE ARTHROSCOPY Right     KNEE ARTHROSCOPY W/ MENISCECTOMY Right 12/14/2022    Procedure: ARTHROSCOPY, KNEE, WITH MENISCECTOMY;  Surgeon: Jeovany Marie MD;  Location: [facility];  Service: Orthopedics;  Laterality: Right;     "ORTHOPEDIC SURGERY Right     librado from knee to ankle    RHIZOTOMY  08/2016    ROBOTIC ARTHROPLASTY, KNEE Right 7/22/2024    Procedure: ROBOTIC ARTHROPLASTY, KNEE, TOTAL;  Surgeon: Jeovany Marie MD;  Location: [facility];  Service: Orthopedics;  Laterality: Right;  [facility]    SPINE SURGERY         Review of Systems   Constitutional:  Negative for appetite change, chills, diaphoresis, malaise/fatigue and unexpected weight change.   HENT:  Negative for ear discharge, facial swelling, hearing loss, nosebleeds and trouble swallowing.    Eyes:  Negative for blurred vision, photophobia, pain and visual disturbance.   Respiratory:  Negative for apnea, choking, shortness of breath and wheezing.    Cardiovascular:  Negative for chest pain, palpitations, orthopnea and PND.   Gastrointestinal:  Negative for abdominal pain, blood in stool and vomiting.   Endocrine: Negative for polyphagia.   Genitourinary:  Negative for difficulty urinating and hematuria.   Musculoskeletal:  Positive for arthralgias. Negative for gait problem, joint swelling and neck pain.   Skin:  Negative for pallor.   Neurological:  Negative for dizziness, seizures, speech difficulty, weakness and headaches.   Hematological:  Does not bruise/bleed easily.   Psychiatric/Behavioral:  Negative for agitation, confusion, dysphoric mood, self-injury, sleep disturbance and suicidal ideas. The patient is not nervous/anxious.       OBJECTIVE:      Vitals:    08/14/24 0838   BP: (P) 122/80   Pulse: 84   SpO2: 96%   Weight: (!) 139.7 kg (308 lb)   Height: 6' 2" (1.88 m)     Physical Exam  Vitals and nursing note reviewed.   Constitutional:       General: He is not in acute distress.     Appearance: He is well-developed.   HENT:      Head: Normocephalic and atraumatic.      Nose: Nose normal.      Mouth/Throat:      Pharynx: Uvula midline.   Eyes:      General: Lids are normal.      Conjunctiva/sclera: Conjunctivae normal.      Pupils: Pupils are equal, round, and " reactive to light.      Right eye: Pupil is round and reactive.      Left eye: Pupil is round and reactive.   Neck:      Thyroid: No thyromegaly.      Vascular: No carotid bruit.   Cardiovascular:      Rate and Rhythm: Normal rate and regular rhythm.      Pulses: Normal pulses.      Heart sounds: Normal heart sounds. No murmur heard.  Pulmonary:      Effort: Pulmonary effort is normal.      Breath sounds: Normal breath sounds. No wheezing, rhonchi or rales.   Abdominal:      General: Bowel sounds are normal.      Palpations: Abdomen is soft. Abdomen is not rigid.      Tenderness: There is no abdominal tenderness.   Musculoskeletal:         General: Normal range of motion.      Cervical back: Normal range of motion and neck supple.      Right lower leg: No edema.      Left lower leg: No edema.      Comments: Bandaid to right proximal knee scar. No erythema, no oozing, no swelling   Lymphadenopathy:      Cervical: No cervical adenopathy.   Skin:     General: Skin is warm and dry.      Nails: There is no clubbing.   Neurological:      Mental Status: He is alert and oriented to person, place, and time.   Psychiatric:         Mood and Affect: Mood normal.         Speech: Speech normal.         Behavior: Behavior normal. Behavior is cooperative.         Thought Content: Thought content normal.         Judgment: Judgment normal.          Hospital Outpatient Visit on 07/15/2024   Component Date Value Ref Range Status    WBC 07/15/2024 8.11  3.90 - 12.70 K/uL Final    RBC 07/15/2024 4.77  4.60 - 6.20 M/uL Final    Hemoglobin 07/15/2024 15.1  14.0 - 18.0 g/dL Final    Hematocrit 07/15/2024 43.1  40.0 - 54.0 % Final    MCV 07/15/2024 90  82 - 98 fL Final    MCH 07/15/2024 31.7 (H)  27.0 - 31.0 pg Final    MCHC 07/15/2024 35.0  32.0 - 36.0 g/dL Final    RDW 07/15/2024 12.3  11.5 - 14.5 % Final    Platelets 07/15/2024 358  150 - 450 K/uL Final    MPV 07/15/2024 9.0 (L)  9.2 - 12.9 fL Final    Immature Granulocytes 07/15/2024 0.1   0.0 - 0.5 % Final    Gran # (ANC) 07/15/2024 5.4  1.8 - 7.7 K/uL Final    Immature Grans (Abs) 07/15/2024 0.01  0.00 - 0.04 K/uL Final    Comment: Mild elevation in immature granulocytes is non specific and   can be seen in a variety of conditions including stress response,   acute inflammation, trauma and pregnancy. Correlation with other   laboratory and clinical findings is essential.      Lymph # 07/15/2024 1.9  1.0 - 4.8 K/uL Final    Mono # 07/15/2024 0.6  0.3 - 1.0 K/uL Final    Eos # 07/15/2024 0.1  0.0 - 0.5 K/uL Final    Baso # 07/15/2024 0.04  0.00 - 0.20 K/uL Final    nRBC 07/15/2024 0  0 /100 WBC Final    Gran % 07/15/2024 67.0  38.0 - 73.0 % Final    Lymph % 07/15/2024 22.9  18.0 - 48.0 % Final    Mono % 07/15/2024 7.8  4.0 - 15.0 % Final    Eosinophil % 07/15/2024 1.7  0.0 - 8.0 % Final    Basophil % 07/15/2024 0.5  0.0 - 1.9 % Final    Differential Method 07/15/2024 Automated   Final    QRS Duration 07/15/2024 90  ms Final    [facility]HS QTC Calculation 07/15/2024 429  ms Final    Sodium 07/15/2024 139  136 - 145 mmol/L Final    Potassium 07/15/2024 3.9  3.5 - 5.1 mmol/L Final    Chloride 07/15/2024 102  95 - 110 mmol/L Final    CO2 07/15/2024 25  23 - 29 mmol/L Final    Glucose 07/15/2024 98  70 - 110 mg/dL Final    BUN 07/15/2024 15  8 - 23 mg/dL Final    Creatinine 07/15/2024 1.2  0.5 - 1.4 mg/dL Final    Calcium 07/15/2024 9.8  8.7 - 10.5 mg/dL Final    Anion Gap 07/15/2024 12  8 - 16 mmol/L Final    eGFR 07/15/2024 >60  >60 mL/min/1.73 m^2 Final    MRSA SCREEN BY PCR 07/15/2024 Negative  Negative Final    aPTT 07/15/2024 45.0 (H)  21.0 - 32.0 sec Final    Comment: Refer to local heparin nomogram for intensity/dose specific   therapeutic   range.      Prothrombin Time 07/15/2024 13.0 (H)  9.0 - 12.5 sec Final    INR 07/15/2024 1.2  0.8 - 1.2 Final    Comment: Coumadin Therapy:  2.0 - 3.0 for INR for all indicators except mechanical heart valves  and antiphospholipid syndromes which should use 2.5 -  3.5.     ]  Assessment:       1. Pure hypercholesterolemia    2. Essential hypertension    3. Vitamin B 12 deficiency    4. Elevated glucose    5. Screening PSA (prostate specific antigen)        Plan:       Pure hypercholesterolemia  -     Lipid Panel; Future; Expected date: 08/28/2024  -     Comprehensive Metabolic Panel; Future; Expected date: 08/28/2024  -     rosuvastatin (CRESTOR) 10 MG tablet; Take 1 tablet (10 mg total) by mouth once daily.  Dispense: 90 tablet; Refill: 1    Essential hypertension  -     amLODIPine (NORVASC) 2.5 MG tablet; Take 1 tablet (2.5 mg total) by mouth once daily.  Dispense: 90 tablet; Refill: 1    Vitamin B 12 deficiency  Stable with otc meds    Elevated glucose  -     Hemoglobin A1C; Future; Expected date: 08/28/2024    Screening PSA (prostate specific antigen)  -     PSA, Screening; Future; Expected date: 08/28/2024        Follow up in about 6 months (around 2/14/2025) for hyperlipidemia .      8/14/2024 PHILLIP Rivera, FNP

## 2024-08-14 ENCOUNTER — OFFICE VISIT (OUTPATIENT)
Dept: FAMILY MEDICINE | Facility: CLINIC | Age: 64
End: 2024-08-14
Payer: MEDICAID

## 2024-08-14 VITALS — BODY MASS INDEX: 39.53 KG/M2 | OXYGEN SATURATION: 96 % | HEART RATE: 84 BPM | HEIGHT: 74 IN | WEIGHT: 308 LBS

## 2024-08-14 DIAGNOSIS — R73.09 ELEVATED GLUCOSE: ICD-10-CM

## 2024-08-14 DIAGNOSIS — I10 ESSENTIAL HYPERTENSION: ICD-10-CM

## 2024-08-14 DIAGNOSIS — E53.8 VITAMIN B 12 DEFICIENCY: ICD-10-CM

## 2024-08-14 DIAGNOSIS — Z12.5 SCREENING PSA (PROSTATE SPECIFIC ANTIGEN): ICD-10-CM

## 2024-08-14 DIAGNOSIS — E78.00 PURE HYPERCHOLESTEROLEMIA: Primary | ICD-10-CM

## 2024-08-14 PROCEDURE — 99214 OFFICE O/P EST MOD 30 MIN: CPT | Mod: S$GLB,,, | Performed by: NURSE PRACTITIONER

## 2024-08-14 PROCEDURE — 1160F RVW MEDS BY RX/DR IN RCRD: CPT | Mod: CPTII,S$GLB,, | Performed by: NURSE PRACTITIONER

## 2024-08-14 PROCEDURE — 3044F HG A1C LEVEL LT 7.0%: CPT | Mod: CPTII,S$GLB,, | Performed by: NURSE PRACTITIONER

## 2024-08-14 PROCEDURE — 3008F BODY MASS INDEX DOCD: CPT | Mod: CPTII,S$GLB,, | Performed by: NURSE PRACTITIONER

## 2024-08-14 PROCEDURE — 1159F MED LIST DOCD IN RCRD: CPT | Mod: CPTII,S$GLB,, | Performed by: NURSE PRACTITIONER

## 2024-08-14 RX ORDER — AMLODIPINE BESYLATE 2.5 MG/1
2.5 TABLET ORAL DAILY
Qty: 90 TABLET | Refills: 1 | Status: SHIPPED | OUTPATIENT
Start: 2024-08-14

## 2024-08-14 RX ORDER — ROSUVASTATIN CALCIUM 10 MG/1
10 TABLET, COATED ORAL DAILY
Qty: 90 TABLET | Refills: 1 | Status: SHIPPED | OUTPATIENT
Start: 2024-08-14

## 2024-08-27 ENCOUNTER — HOSPITAL ENCOUNTER (OUTPATIENT)
Dept: RADIOLOGY | Facility: HOSPITAL | Age: 64
Discharge: HOME | End: 2024-08-27
Attending: ORTHOPAEDIC SURGERY
Payer: MEDICAID

## 2024-08-27 ENCOUNTER — OFFICE VISIT (OUTPATIENT)
Dept: ORTHOPEDICS | Facility: CLINIC | Age: 64
End: 2024-08-27
Payer: MEDICAID

## 2024-08-27 VITALS — WEIGHT: 308 LBS | HEIGHT: 74 IN | BODY MASS INDEX: 39.53 KG/M2

## 2024-08-27 DIAGNOSIS — Z96.651 S/P TOTAL KNEE ARTHROPLASTY, RIGHT: Primary | ICD-10-CM

## 2024-08-27 PROCEDURE — 99024 POSTOP FOLLOW-UP VISIT: CPT | Mod: S$PBB,,, | Performed by: ORTHOPAEDIC SURGERY

## 2024-08-27 PROCEDURE — 3044F HG A1C LEVEL LT 7.0%: CPT | Mod: CPTII,,, | Performed by: ORTHOPAEDIC SURGERY

## 2024-08-27 PROCEDURE — 1159F MED LIST DOCD IN RCRD: CPT | Mod: CPTII,,, | Performed by: ORTHOPAEDIC SURGERY

## 2024-08-27 PROCEDURE — 73562 X-RAY EXAM OF KNEE 3: CPT | Mod: TC,PN,RT

## 2024-08-27 PROCEDURE — 99999 PR PBB SHADOW E&M-EST. PATIENT-LVL III: CPT | Mod: PBBFAC,,, | Performed by: ORTHOPAEDIC SURGERY

## 2024-08-27 PROCEDURE — 99213 OFFICE O/P EST LOW 20 MIN: CPT | Mod: PBBFAC,25,PN | Performed by: ORTHOPAEDIC SURGERY

## 2024-08-27 PROCEDURE — 73562 X-RAY EXAM OF KNEE 3: CPT | Mod: 26,RT,, | Performed by: RADIOLOGY

## 2024-08-27 PROCEDURE — 1160F RVW MEDS BY RX/DR IN RCRD: CPT | Mod: CPTII,,, | Performed by: ORTHOPAEDIC SURGERY

## 2024-08-27 NOTE — PROGRESS NOTES
[facility]    Subjective:     Chief Complaint:   Chief Complaint   Patient presents with    Right Knee - Post-op Evaluation     Patient is here ofr a PO f/u on right TKA 7.22.24, wound check. States pain has stayed about the same since last visit. Has numbness and tingling as well as burning and shooting pain        Past Medical History:   Diagnosis Date    A-fib     On eliquis//Dr Thibodeaux    Arthritis     Back pain     CAD (coronary artery disease) 01/13/2017    Cervical spinal stenosis     Digestive disorder     Hyperlipidemia     Hypertension     Migraine headache     Neck pain     Sleep apnea     Stroke     TIA 2020    TIA (transient ischemic attack)        Past Surgical History:   Procedure Laterality Date    APPENDECTOMY      CERVICAL FUSION  2009    CROSS FINGER FLAP      FRACTURE SURGERY      steel librado in right leg    KNEE ARTHROSCOPY Right     KNEE ARTHROSCOPY W/ MENISCECTOMY Right 12/14/2022    Procedure: ARTHROSCOPY, KNEE, WITH MENISCECTOMY;  Surgeon: Jeovany Marie MD;  Location: [facility];  Service: Orthopedics;  Laterality: Right;    ORTHOPEDIC SURGERY Right     librado from knee to ankle    RHIZOTOMY  08/2016    ROBOTIC ARTHROPLASTY, KNEE Right 7/22/2024    Procedure: ROBOTIC ARTHROPLASTY, KNEE, TOTAL;  Surgeon: Jeovany Marie MD;  Location: [facility];  Service: Orthopedics;  Laterality: Right;  [place]    SPINE SURGERY         Current Outpatient Medications   Medication Sig    amLODIPine (NORVASC) 2.5 MG tablet Take 1 tablet (2.5 mg total) by mouth once daily.    hydroCHLOROthiazide (HYDRODIURIL) 12.5 MG Tab Take 12.5 mg by mouth once daily.    omeprazole (PRILOSEC) 40 MG capsule TAKE 1 CAPSULE(40 MG) BY MOUTH EVERY DAY    rosuvastatin (CRESTOR) 10 MG tablet Take 1 tablet (10 mg total) by mouth once daily.    XARELTO 20 mg Tab TAKE 1 TABLET BY MOUTH DAILY WITH DINNER     No current facility-administered medications for this visit.       Review of patient's allergies indicates:   Allergen  Reactions    Amoxicillin Hives    Corticosteroids (glucocorticoids)        Family History   Problem Relation Name Age of Onset    Hypertension Mother      No Known Problems Sister 1L/1D     No Known Problems Brother 3L/1D        Social History     Socioeconomic History    Marital status:    Tobacco Use    Smoking status: Never     Passive exposure: Past    Smokeless tobacco: Never   Substance and Sexual Activity    Alcohol use: No    Drug use: No    Sexual activity: Yes     Partners: Female     Social Determinants of Health     Financial Resource Strain: Low Risk  (6/10/2024)    Overall Financial Resource Strain (CARDIA)     Difficulty of Paying Living Expenses: Not very hard   Food Insecurity: No Food Insecurity (6/10/2024)    Hunger Vital Sign     Worried About Running Out of Food in the Last Year: Never true     Ran Out of Food in the Last Year: Never true   Transportation Needs: No Transportation Needs (9/6/2023)    [facility]    PRAPARE - Transportation     Lack of Transportation (Medical): No     Lack of Transportation (Non-Medical): No   Physical Activity: Insufficiently Active (6/10/2024)    Exercise Vital Sign     Days of Exercise per Week: 2 days     Minutes of Exercise per Session: 30 min   Stress: No Stress Concern Present (6/10/2024)    [employer] - Occupational Stress Questionnaire     Feeling of Stress : Only a little   Housing Stability: Low Risk  (9/6/2023)    Received from [facility], [facility]    Housing Stability Vital Sign     Unable to Pay for Housing in the Last Year: No     Number of Places Lived in the Last Year: 1     In the last 12 months, was there a time when you did not have a steady place to sleep or slept in a shelter (including now)?: No       History of present illness: 63 y.o. male returns to clinic today status post Right knee arthroplasty July 22nd.  Patient is here today for routine follow-up.       Review of  Systems:    Constitution: Negative for chills, fever, and sweats.  Negative for unexplained weight loss.    HENT:  Negative for headaches and blurry vision.    Cardiovascular:Negative for chest pain or irregular heart beat. Negative for hypertension.    Respiratory:  Negative for cough and shortness of breath.    Gastrointestinal: Negative for abdominal pain, heartburn, melena, nausea, and vomitting.    Genitourinary:  Negative bladder incontinence and dysuria.    Musculoskeletal:  See HPI for details.     Neurological: Negative for numbness.    Psychiatric/Behavioral: Negative for depression.  The patient is not nervous/anxious.      Endocrine: Negative for polyuria    Hematologic/Lymphatic: Negative for bleeding problem.  Does not bruise/bleed easily.    Skin: Negative for poor would healing and rash    Objective:      Physical Examination:    Vital Signs:  There were no vitals filed for this visit.    Body mass index is 39.54 kg/m².    This a well-developed, well nourished patient in no acute distress.  They are alert and oriented and cooperative to examination.           Examination of the right knee, skin is dry and intact, no erythema or ecchymosis.  No evidence of wound failure dehiscence.  Range of motion, extension 0, flexion is 110°.  Stable in flexion and extension.  Homans signs negative, straight leg raise negative.    Pertinent New Results:    XRAY Report / Interpretation:   AP lateral sunrise views of the right knee taken today in the office demonstrate a right total knee arthroplasty to be in appropriate position without evidence of hardware failure or loosening.    Assessment/Plan:      Stable status post right total knee arthroplasty almost 6 weeks ago.  He has got excellent range of motion.  He is still having some achy discomfort which is to be expected.  We dropped his physical therapy down 1 to 2 times a week.  We will check him back 1 more time in 2 months.  Positive reassurance  "tayo Seth, Physician Assistant, served in the capacity as a "scribe" for this patient encounter.  A "face-to-face" encounter occurred with Dr. Jeovany Marie on this date.  The treatment plan and medical decision-making is outlined above. Patient was seen and examined with a chaperone.       This note was created using Dragon voice recognition software that occasionally misinterpreted phrases or words.          "

## 2024-10-22 ENCOUNTER — PATIENT MESSAGE (OUTPATIENT)
Dept: FAMILY MEDICINE | Facility: CLINIC | Age: 64
End: 2024-10-22
Payer: MEDICAID

## 2024-10-22 DIAGNOSIS — K21.9 GASTROESOPHAGEAL REFLUX DISEASE, UNSPECIFIED WHETHER ESOPHAGITIS PRESENT: ICD-10-CM

## 2024-10-22 RX ORDER — OMEPRAZOLE 40 MG/1
40 CAPSULE, DELAYED RELEASE ORAL DAILY
Qty: 30 CAPSULE | Refills: 1 | Status: SHIPPED | OUTPATIENT
Start: 2024-10-22

## 2024-11-19 ENCOUNTER — OFFICE VISIT (OUTPATIENT)
Dept: ORTHOPEDICS | Facility: CLINIC | Age: 64
End: 2024-11-19
Payer: MEDICAID

## 2024-11-19 ENCOUNTER — HOSPITAL ENCOUNTER (OUTPATIENT)
Dept: RADIOLOGY | Facility: HOSPITAL | Age: 64
Discharge: HOME | End: 2024-11-19
Attending: ORTHOPAEDIC SURGERY
Payer: MEDICAID

## 2024-11-19 VITALS — BODY MASS INDEX: 39.53 KG/M2 | HEIGHT: 74 IN | WEIGHT: 308 LBS

## 2024-11-19 DIAGNOSIS — Z96.651 HISTORY OF TOTAL KNEE ARTHROPLASTY, RIGHT: Primary | ICD-10-CM

## 2024-11-19 PROCEDURE — 3008F BODY MASS INDEX DOCD: CPT | Mod: CPTII,,, | Performed by: ORTHOPAEDIC SURGERY

## 2024-11-19 PROCEDURE — 99999 PR PBB SHADOW E&M-EST. PATIENT-LVL III: CPT | Mod: PBBFAC,,, | Performed by: ORTHOPAEDIC SURGERY

## 2024-11-19 PROCEDURE — 73562 X-RAY EXAM OF KNEE 3: CPT | Mod: 26,RT,, | Performed by: RADIOLOGY

## 2024-11-19 PROCEDURE — 3044F HG A1C LEVEL LT 7.0%: CPT | Mod: CPTII,,, | Performed by: ORTHOPAEDIC SURGERY

## 2024-11-19 PROCEDURE — 1160F RVW MEDS BY RX/DR IN RCRD: CPT | Mod: CPTII,,, | Performed by: ORTHOPAEDIC SURGERY

## 2024-11-19 PROCEDURE — 73562 X-RAY EXAM OF KNEE 3: CPT | Mod: TC,PN,RT

## 2024-11-19 PROCEDURE — 99213 OFFICE O/P EST LOW 20 MIN: CPT | Mod: S$PBB,,, | Performed by: ORTHOPAEDIC SURGERY

## 2024-11-19 PROCEDURE — 99213 OFFICE O/P EST LOW 20 MIN: CPT | Mod: PBBFAC,25,PN | Performed by: ORTHOPAEDIC SURGERY

## 2024-11-19 PROCEDURE — 1159F MED LIST DOCD IN RCRD: CPT | Mod: CPTII,,, | Performed by: ORTHOPAEDIC SURGERY

## 2024-11-19 RX ORDER — METHYLPREDNISOLONE 4 MG/1
TABLET ORAL
Qty: 21 EACH | Refills: 0 | Status: SHIPPED | OUTPATIENT
Start: 2024-11-19 | End: 2024-12-10

## 2024-11-19 NOTE — PROGRESS NOTES
[facility]    Subjective:     Chief Complaint:   Chief Complaint   Patient presents with    Right Knee - Pain     Patient is here for a follow up on right TKA 7.22.24, states pain has gotten worse since last visit. Has popping and grinding as well as stinging sensation        Past Medical History:   Diagnosis Date    A-fib     On eliquis//Dr Thibodeaux    Arthritis     Back pain     CAD (coronary artery disease) 01/13/2017    Cervical spinal stenosis     Digestive disorder     Hyperlipidemia     Hypertension     Migraine headache     Neck pain     Sleep apnea     Stroke     TIA 2020    TIA (transient ischemic attack)        Past Surgical History:   Procedure Laterality Date    APPENDECTOMY      CERVICAL FUSION  2009    CROSS FINGER FLAP      FRACTURE SURGERY      steel librado in right leg    KNEE ARTHROSCOPY Right     KNEE ARTHROSCOPY W/ MENISCECTOMY Right 12/14/2022    Procedure: ARTHROSCOPY, KNEE, WITH MENISCECTOMY;  Surgeon: Jeovany Marie MD;  Location: [facility] OR;  Service: Orthopedics;  Laterality: Right;    ORTHOPEDIC SURGERY Right     librado from knee to ankle    RHIZOTOMY  08/2016    ROBOTIC ARTHROPLASTY, KNEE Right 7/22/2024    Procedure: ROBOTIC ARTHROPLASTY, KNEE, TOTAL;  Surgeon: Jeovany Marie MD;  Location: [facility] OR;  Service: Orthopedics;  Laterality: Right;  Genesis-Nithin    SPINE SURGERY         Current Outpatient Medications   Medication Sig    amLODIPine (NORVASC) 2.5 MG tablet Take 1 tablet (2.5 mg total) by mouth once daily.    hydroCHLOROthiazide (HYDRODIURIL) 12.5 MG Tab Take 12.5 mg by mouth once daily.    omeprazole (PRILOSEC) 40 MG capsule Take 1 capsule (40 mg total) by mouth once daily.    rosuvastatin (CRESTOR) 10 MG tablet Take 1 tablet (10 mg total) by mouth once daily.    XARELTO 20 mg Tab TAKE 1 TABLET BY MOUTH DAILY WITH DINNER     No current facility-administered medications for this visit.       Review of patient's allergies indicates:   Allergen Reactions    Amoxicillin Hives     Corticosteroids (glucocorticoids)        Family History   Problem Relation Name Age of Onset    Hypertension Mother      No Known Problems Sister 1L/1D     No Known Problems Brother 3L/1D        Social History     Socioeconomic History    Marital status:    Tobacco Use    Smoking status: Never     Passive exposure: Past    Smokeless tobacco: Never   Substance and Sexual Activity    Alcohol use: No    Drug use: No    Sexual activity: Yes     Partners: Female     Social Drivers of Health     Financial Resource Strain: Low Risk  (6/10/2024)    Overall Financial Resource Strain (CARDIA)     Difficulty of Paying Living Expenses: Not very hard   Food Insecurity: No Food Insecurity (6/10/2024)    Hunger Vital Sign     Worried About Running Out of Food in the Last Year: Never true     Ran Out of Food in the Last Year: Never true   Transportation Needs: No Transportation Needs (9/6/2023)    Received from [facility] [employer], [facility] [employer]    PRAPARE - Transportation     Lack of Transportation (Medical): No     Lack of Transportation (Non-Medical): No   Physical Activity: Insufficiently Active (6/10/2024)    Exercise Vital Sign     Days of Exercise per Week: 2 days     Minutes of Exercise per Session: 30 min   Stress: No Stress Concern Present (6/10/2024)    [employer] [place] [employer] - Occupational Stress Questionnaire     Feeling of Stress : Only a little   Housing Stability: Low Risk  (9/6/2023)    Received from [facility] [employer], [facility] [employer]    Housing Stability Vital Sign     Unable to Pay for Housing in the Last Year: No     Number of Places Lived in the Last Year: 1     Unstable Housing in the Last Year: No       History of present illness:  Otis comes in today for follow-up for his right total knee arthroplasty.  He is 4 months postop.  He was doing quite well until about a month ago where he started having popping and increasing pain in the knee.  Denies any new injury or trauma.    Review of  Systems:    Constitution: Negative for chills, fever, and sweats.  Negative for unexplained weight loss.    HENT:  Negative for headaches and blurry vision.    Cardiovascular:Negative for chest pain or irregular heart beat. Negative for hypertension.    Respiratory:  Negative for cough and shortness of breath.    Gastrointestinal: Negative for abdominal pain, heartburn, melena, nausea, and vomitting.    Genitourinary:  Negative bladder incontinence and dysuria.    Musculoskeletal:  See HPI for details.     Neurological: Negative for numbness.    Psychiatric/Behavioral: Negative for depression.  The patient is not nervous/anxious.      Endocrine: Negative for polyuria    Hematologic/Lymphatic: Negative for bleeding problem.  Does not bruise/bleed easily.    Skin: Negative for poor would healing and rash    Objective:      Physical Examination:    Vital Signs:  There were no vitals filed for this visit.    Body mass index is 39.54 kg/m².    This a well-developed, well nourished patient in no acute distress.  They are alert and oriented and cooperative to examination.        Right knee exam: Skin to right knee clean dry and intact.  No erythema or ecchymosis.  No signs or symptoms of infection.  He is neurovascularly intact throughout the right lower extremity.  Right knee anterior midline incision healed without wound dehiscence or drainage.  Right knee range of motion 0-120 degrees.  The knee is stable to varus and valgus stresses.  Negative Homans on the right.  He can weightbear as tolerated on the right lower extremity.  He does have some patellofemoral grind with flexion/extension.    Pertinent New Results:    XRAY Report / Interpretation:   Three views taken of the right knee today: AP, lateral, sunrise views.  No acute fractures or dislocations seen.  Visualized soft tissues appear unremarkable.  He has an anatomically placed right total knee arthroplasty without evidence of hardware failure or  "subsidence.    Assessment/Plan:      1. History of right total knee arthroplasty.      We will place him on a Medrol Dosepak.  We will get him back into PT for range of motion and quad strengthening.  Check him back in 2 months.  This should resolve with a tincture of time in the aforementioned treatment modalities.    Sy Mcfarland, Physician Assistant, served in the capacity as a "scribe" for this patient encounter.  A "face-to-face" encounter occurred with Dr. Jeovany Marie on this date.  The treatment plan and medical decision-making is outlined above. Patient was seen and examined with a chaperone.       This note was created using Dragon voice recognition software that occasionally misinterpreted phrases or words.          "

## 2024-11-22 ENCOUNTER — CLINICAL SUPPORT (OUTPATIENT)
Dept: REHABILITATION | Facility: HOSPITAL | Age: 64
End: 2024-11-22
Payer: MEDICAID

## 2024-11-22 DIAGNOSIS — Z96.651 HISTORY OF TOTAL KNEE ARTHROPLASTY, RIGHT: ICD-10-CM

## 2024-11-22 DIAGNOSIS — R53.1 WEAKNESS: ICD-10-CM

## 2024-11-22 DIAGNOSIS — M62.89 ABNORMAL INCREASED MUSCLE TONE: ICD-10-CM

## 2024-11-22 DIAGNOSIS — R68.89 DECREASED FUNCTIONAL ACTIVITY TOLERANCE: Primary | ICD-10-CM

## 2024-11-22 DIAGNOSIS — M25.561 ACUTE PAIN OF RIGHT KNEE: ICD-10-CM

## 2024-11-22 DIAGNOSIS — Z78.9 NEED FOR HOME EXERCISE PROGRAM: ICD-10-CM

## 2024-11-22 PROCEDURE — 97162 PT EVAL MOD COMPLEX 30 MIN: CPT | Mod: PN

## 2024-11-22 NOTE — PROGRESS NOTES
See the evaluation and plan of care in the treatment section of this patient's chart with today's date.

## 2024-11-23 PROBLEM — Z78.9 NEED FOR HOME EXERCISE PROGRAM: Status: ACTIVE | Noted: 2024-11-23

## 2024-11-23 PROBLEM — R53.1 WEAKNESS: Status: ACTIVE | Noted: 2024-11-23

## 2024-11-23 PROBLEM — M62.89 ABNORMAL INCREASED MUSCLE TONE: Status: ACTIVE | Noted: 2024-11-23

## 2024-11-23 PROBLEM — M25.561 ACUTE PAIN OF RIGHT KNEE: Status: ACTIVE | Noted: 2024-11-23

## 2024-11-23 PROBLEM — R68.89 DECREASED FUNCTIONAL ACTIVITY TOLERANCE: Status: ACTIVE | Noted: 2024-11-23

## 2024-11-24 NOTE — PLAN OF CARE
"[facility]   Physical Therapy Initial Evaluation     Date: 11/22/2024   Name: Otis Raygoza [facility] Number: [id number]    Therapy Diagnosis:   Encounter Diagnoses   Name Primary?    History of total knee arthroplasty, right     Acute pain of right knee     Weakness     Abnormal increased muscle tone     Decreased functional activity tolerance Yes    Need for home exercise program      Physician: Jeovany Marie MD    Physician Orders: PT Eval and Treat and 2-3 times per week for 6 weeks  Medical Diagnosis from Referral: Z96.651 (ICD-10-CM) - History of total knee arthroplasty, right  Evaluation Date: 11/22/2024  Authorization Period Expiration: 11/19/2025  Plan of Care Expiration: 01/24/2024 at 2x/wk for 6 weeks  Progress Note Due: 12/22/2024  Visit # / Visits authorized: 1/1   FOTO: 1/3    Time In: 2:02 pm  Time Out: 3:05 pm  Total Appointment Time (timed & untimed codes): 63 minutes    Precautions: Standard cervical fusion in 2009 and a current need for another one, and a metal librado in the right tibia from an old and unrelated injury.  SUBJECTIVE   Date of onset: He reports a month long history of these current complaints.     History of current condition - Otis reports: He reports that he was moving a 500 pound head board and stepped down and pivoted on a landing while his foot stayed planted. He describes a medial rotation with his right foot planted. He reports that ever since then his knee crunches. His right knee was replaced in July of this year. Prior to this incident, he reports that his right knee was doing great. "I wasn't having any problems." He returned to his surgeon. His surgeon believes the incident may have broken up scar tissue. PT asked did the surgeon mention the medial patella femoral ligament. "Yeah, he said that may be part of the problem. He said it may be causing the arthritis to the knee cap. He wanted me to try PT for six weeks, and if I wasn't any better " "that he was going to scope the knee." PT acknowledges. His pain is on the medial and lateral sides of the inferior pole of the patella and the posterolateral portion of the right knee. He reports that his knee cap felt like it "popped out" and went back in when the injury occurred. He describes this as happening a few more times since the injury. "I would go from the gas to the brakes, and it felt like it would pop out." He reports that his knee would "pop" before it was replaced. He reports that it still "popped" after it was replaced. He reports that "popping" has become severe since he was moving the head board.     Falls: There is no history of falls.     Imaging:    Prior Therapy: He had PT after his knee was replaced, but he has not had PT for these current complaints.   Social History: Otis lives with his wife. He has an upstairs, but he reports rarely going up there. When he does, he reports more pain going up the stairs then down the stairs.   Occupation: He now works from home. He does his job while sitting at a computer.  Prior Level of Function: He was performing activities as desired without any right knee limitations. This includes since the right knee was replaced.   Current Level of Function: He reports that his pain is bad enough at times that he has to go sit or lay down for a few minutes. He is functioning around his home and with desired activities such as golf, but he reports intense pain at times that was not present before this injury from a month ago. He has trouble putting his pant's left leg on and his right shoe on as he has to stand on the right leg and or bend the right leg and then straighten it back out which is painful.      Pain:  Current 3/10, worst 9/10, best 3/10   Location: Medial and Lateral to the inferior pole of the patella and the posterolateral joint line.   Description: Grabbing, Sharp, Electric, Shooting, and Variable  Aggravating Factors: Stairs, accidental movement " "that he was unaware would hurt, squatting to get on and off the toilet, when playing golf, and if he sits or holds the leg in one position for too long.    Easing Factors: A tub bath with hot water, a heating pad, and Tylenol.     Patients goals: "I want to go back to my normal self and routine. I want to be able to get up and down."      Medical History:   Past Medical History:   Diagnosis Date    A-fib     On nacho//Dr Thibodeaux    Arthritis     Back pain     CAD (coronary artery disease) 01/13/2017    Cervical spinal stenosis     Digestive disorder     Hyperlipidemia     Hypertension     Migraine headache     Neck pain     Sleep apnea     Stroke     TIA 2020    TIA (transient ischemic attack)      Surgical History:   Otis Colón  has a past surgical history that includes Cervical fusion (2009); Appendectomy; Cross finger flap; Rhizotomy (08/2016); Knee arthroscopy (Right); orthopedic surgery (Right); Fracture surgery; Spine surgery; Knee arthroscopy w/ meniscectomy (Right, 12/14/2022); and robotic arthroplasty, knee (Right, 7/22/2024).    Medications:   Otis has a current medication list which includes the following prescription(s): amlodipine, hydrochlorothiazide, methylprednisolone, omeprazole, rosuvastatin, and xarelto.    Allergies:   Review of patient's allergies indicates:   Allergen Reactions    Amoxicillin Hives    Corticosteroids (glucocorticoids)       OBJECTIVE     Observation: He entered with a non antalgic gait. He was in significant distress with certain right knee tests. When laying supine, his right leg was externally rotated. He related it to the tibia injury from a long time ago, but the femur appeared rotated also. Tight hip muscles may be contributing.     Range of Motion:   Knee Left active Right Active   Flexion          135*       115*   Extension           0*         +5*     Lower Extremity Strength  Right LE  Left LE    Knee extension: 4+/5 Knee extension: 5/5   Knee flexion: " 4/5 Knee flexion: 5/5     Special Tests:   Left Right   Valgus Stress Test (MCL) Negative Negative   Varus Stress test (LCL) Negative Positive for pain in the posterolateral area   Harris's Test (Meniscus)  Had pain during testing but not that it indicated a meniscus. It was with lateral testing and related to the posterolateral area.   Patellar Grind Test (PFS/OA) Negative Positive     Joint Mobility: No restrictions are noted. He appears hypermobile medially and laterally. He reported a decrease in pain going from flexion to extension and back to flexion when a medial force was applied to the patella.   Patellar    Palpation: He was significantly tender to the posterior joint line and the posterior lateral portion of the knee with emphasis at the hamstring/gastrocnemius tendon area. Palpation with a heel dig significantly increased the pain in that area.    Sensation: The patient's bilateral lower extremity sensation is intact to light touch and pin prick throughout each lower extremity.    Flexibility:    Ely's test (Rectus Femoris):              R = (+) Moderate Increase in tone ;                        L = (-) Minimal tone increase    Popliteal Angle (Hamstring tone):     R = (+) Minimal to moderate increase in tone ;     L = (-)   Gastrocnemius tone:                          R = (+) Moderate Increase in tone ;                        L = (-) Minimal tone increase              Tensor (IT band):                               R = (+) Moderate to Severe Increase in tone         L = (-) Minimal tone increase     Edema: There is right knee warmth noted with general edema present. He also exhibits a pocket of edema at the inferior and lateral side of the right patella. Another pocket of edema is noted at the posterior right knee joint line. No Baker's Cyst is felt.     Girth Measurement Joint line   Left    44  cm   Right    47.9 cm     Limitation/Restriction for FOTO Knee Survey    Therapist reviewed FOTO scores for  Otis Colón on 11/22/2024.   FOTO documents entered into [employer] - see Media section.    Limitation Score: 25% Function at the Evaluation.       TREATMENT     Total Treatment time (time-based codes) separate from Evaluation: 0 minutes      Otis received the treatments listed below:  N/A    PATIENT EDUCATION AND HOME EXERCISES     Education provided:   -The patient received education with demonstration by PT of a seated hamstring stretch with a 30 second hold. He was informed to perform three repetitions 2 x day. He was educated on receiving an initial home program soon, so that PT and PTA could better assess his home program needs.     Written Home Exercises Provided: See above. Exercises were reviewed and Otis was able to demonstrate them prior to the end of the session.  Otis demonstrated good  understanding of the education provided. See EMR under Patient Instructions for exercises provided during therapy sessions.  ASSESSMENT     Otis is a 64 y.o. male referred to outpatient Physical Therapy with a medical diagnosis of Z96.651 (ICD-10-CM) - History of total knee arthroplasty, right. He presents with a month long exacerbation of right knee pain and swelling. He shows a slight decrease in strength. Warmth and edema are still present. He is to start a Medrol Dos Esau 11/23/2024 which should help. He has abnormal increased muscle tone. He function is significantly affected. He needs patient education and a proper home exercise program.     The patient's prognosis is Good.   The patient will benefit from skilled outpatient Physical Therapy in order to address the deficits stated above and in the chart below, provide patient /family education, and to maximize the patient's level of independence.     Plan of care discussed with patient: Yes  The patient's spiritual, cultural and educational needs were considered. The patient is agreeable to the plan of care and its goals as stated below.    Anticipated  Barriers for therapy: his co-morbidities, he attempts to perform desired functions that may not be recommended, and his Dr anticipates that he may need his knee scoped.     Medical Necessity is demonstrated by the following  History  Co-morbidities and personal factors that may impact the plan of care Co-morbidities:   CAD, coping style/mechanism, difficulty sleeping, history of CVA, HTN, level of undertstanding of current condition, and prior neck surgery, prior knee surgery, A-fib, arthritis, back pain, cervical spinal stenosis, digestive disorder, hyperlipidemia, migraines, and TIA.     Personal Factors:   coping style  lifestyle  character  attitudes     high   Examination  Body Structures and Functions, activity limitations and participation restrictions that may impact the plan of care Body Regions:   lower extremities  trunk    Body Systems:    ROM  strength  gait  motor control  motor learning  scar formation  Abnormal increased tone and pain control.     Participation Restrictions:   none    Activity limitations:   Learning and applying knowledge  no deficits    General Tasks and Commands  no deficits    Communication  no deficits    Mobility  lifting and carrying objects  driving (bike, car, motorcycle)    Self care  toileting  dressing  looking after one's health    Domestic Life  shopping  cooking  doing house work (cleaning house, washing dishes, laundry)  assisting others    Interactions/Relationships  no deficits    Life Areas  employment    Community and Social Life  community life  recreation and leisure         high   Clinical Presentation stable and uncomplicated moderate   Decision Making/ Complexity Score: moderate     Goals:    STG  Weeks/Visits Date Established  Date Met   1.Decrease his max right knee pain to 6/10 in order to improve his quality of life. 3 weeks 11/22/2024   In Progress   2. Decrease all right knee's muscular tone to Minimal to Moderate Increase in order to improve his  ability to get in and out of his vehicle and up and down from a low seat. 3 weeks 11/22/2024   In Progress   3.Increase his right knee strength a 1/2 grade in order to tolerate his desired standing static or dynamic activities.  3 weeks 11/22/2024   In Progress   4.Increase his FOTO score to >=30% in order to improve his household and recreational activity ability.  3 weeks 11/22/2024   In Progress   5.Fair initial written home exercise program knowledge and be without questions in order to have carryover after discharge from PT.  3 weeks 11/22/2024   In Progress     LTG Weeks/Visits Date Established  Date Met   1.Decrease his max right knee pain to 3/10 in order to improve his quality of life. 6 weeks 11/22/2024   In Progress   2. Decrease all right knee's muscular tone to Minimal Increase in order to improve his ability to get in and out of his vehicle, up and down from a low seat, and be able to go from knee flexion to extension without hesitation. 6 weeks 11/22/2024     In Progress   3.Increase his right knee strength one grade from the evaluation date in order to tolerate his desired standing static or dynamic activities.  6 weeks 11/22/2024   In Progress   4.Increase his FOTO score to >=35% in order to improve his household and recreational activity ability.  6 weeks 11/22/2024   In Progress   5.Good Progressed written home exercise program knowledge and be without questions in order to have carryover after discharge from PT.  6 weeks 11/22/2024   In Progress     PLAN   Plan of care Certification: 11/22/2024 to 01/24/2024.    Outpatient Physical Therapy 2 times weekly for 6 weeks to include the following interventions: Electrical Stimulation unattended and Ethiopian, Gait Training, Manual Therapy, Moist Heat/ Ice, Neuromuscular Re-ed, Orthotic Management and Training, Patient Education, Self Care, Therapeutic Activities, Therapeutic Exercise, Ultrasound, and care by a PTA.     Michael Pulido, PT      I CERTIFY THE  NEED FOR THESE SERVICES FURNISHED UNDER THIS PLAN OF TREATMENT AND WHILE UNDER MY CARE   Physician's comments:     Physician's Signature: ___________________________________________________

## 2024-11-25 ENCOUNTER — CLINICAL SUPPORT (OUTPATIENT)
Dept: REHABILITATION | Facility: HOSPITAL | Age: 64
End: 2024-11-25
Payer: MEDICAID

## 2024-11-25 ENCOUNTER — DOCUMENTATION ONLY (OUTPATIENT)
Dept: REHABILITATION | Facility: HOSPITAL | Age: 64
End: 2024-11-25

## 2024-11-25 DIAGNOSIS — Z78.9 NEED FOR HOME EXERCISE PROGRAM: ICD-10-CM

## 2024-11-25 DIAGNOSIS — M62.89 ABNORMAL INCREASED MUSCLE TONE: ICD-10-CM

## 2024-11-25 DIAGNOSIS — R53.1 WEAKNESS: ICD-10-CM

## 2024-11-25 DIAGNOSIS — R68.89 DECREASED FUNCTIONAL ACTIVITY TOLERANCE: ICD-10-CM

## 2024-11-25 DIAGNOSIS — M25.561 ACUTE PAIN OF RIGHT KNEE: Primary | ICD-10-CM

## 2024-11-25 PROCEDURE — 97110 THERAPEUTIC EXERCISES: CPT | Mod: KX,PN,CQ

## 2024-11-25 NOTE — PROGRESS NOTES
PT/PTA met face to face to discuss pt's treatment plan and progress towards established goals. Pt will be seen by a physical therapist minimally every 6th visit or every 30 days.    Nelida Hanks PTA

## 2024-11-25 NOTE — PROGRESS NOTES
[facility]   Physical Therapy Treatment Note     Name: Otis Raygoza [place]  Clinic Number: [id number]    Therapy Diagnosis:   Encounter Diagnoses   Name Primary?    Acute pain of right knee Yes    Weakness     Abnormal increased muscle tone     Decreased functional activity tolerance     Need for home exercise program      Physician: Jeovany Marie MD    Visit Date: 11/25/2024    Physician Orders: PT Eval and Treat and 2-3 times per week for 6 weeks  Medical Diagnosis from Referral: Z96.651 (ICD-10-CM) - History of total knee arthroplasty, right  Evaluation Date: 11/22/2024  Authorization Period Expiration: 11/19/2025  Plan of Care Expiration: 01/24/2024 at 2x/wk for 6 weeks  Progress Note Due: 12/22/2024  Visit # / Visits authorized: 1/20   FOTO: 1/3      Precautions:  Standard cervical fusion in 2009 and a current need for another one, and a metal librado in the right tibia from an old and unrelated injury.    Time In: 0801   Time Out: 0856  Total Billable Time: 55 minutes      SUBJECTIVE     Pt reports: pain in distal hamstring, posterior knee region, and anterior knee distal to patella.  He was compliant with home exercise program.  Response to previous treatment: first visit after eval  Functional change: ongoing    Pain: 2/10  Location: right knee      OBJECTIVE     Objective Measures updated at progress report unless specified.     Treatment     Otis received the treatments listed below:      Therapeutic exercises to develop strength, ROM, and flexibility for 55 MEDICAID minutes including:    Myofacial Release with therapy bar IASTM to distal hamstring, posterior knee  Straight Leg Raise with External Rotation 2# x 30  Short Arc Quads 4# x 30  Neural glide df/pf x 20 R    Slant board Gastroc stretch 3 x 30 sec Bilateral   Slant board Soleus stretch 3 x 30 sec Bilateral    Terminal Knee Extension with ball behind knee against wall x 30    Precor 3 x 10  Hip adduction 50#   Knee  extension 40#   Knee flexion 40#, 30#    Patient Education and Home Exercises     Home Exercises Provided and Patient Education Provided     Education provided:   - cont HEP    Written Home Exercises Provided: Patient instructed to cont prior HEP. Exercises were reviewed and Otis was able to demonstrate them prior to the end of the session.  Otis demonstrated good  understanding of the education provided. See EMR under Patient Instructions for exercises provided during therapy sessions    ASSESSMENT     Otis presents with pain in anterior and posterior knee region, which affects his gait pattern.  Overall, good tolerance for PRE's.  Continued flexibility and strengthening to decrease fall risk and improve functional activities.     Otis Is progressing well towards his goals.   Pt prognosis is Good.     Pt will continue to benefit from skilled outpatient physical therapy to address the deficits listed in the problem list box on initial evaluation, provide pt/family education and to maximize pt's level of independence in the home and community environment.     Pt's spiritual, cultural and educational needs considered and pt agreeable to plan of care and goals.     Anticipated barriers to physical therapy: his co-morbidities, he attempts to perform desired functions that may not be recommended, and his Dr anticipates that he may need his knee scoped.    Goals:     STG  Weeks/Visits Date Established  Date Met   1.Decrease his max right knee pain to 6/10 in order to improve his quality of life. 3 weeks 11/22/2024    In Progress  11/25/2024   2. Decrease all right knee's muscular tone to Minimal to Moderate Increase in order to improve his ability to get in and out of his vehicle and up and down from a low seat. 3 weeks 11/22/2024    In Progress  11/25/2024     3.Increase his right knee strength a 1/2 grade in order to tolerate his desired standing static or dynamic activities.  3 weeks 11/22/2024    In  Progress  11/25/2024     4.Increase his FOTO score to >=30% in order to improve his household and recreational activity ability.  3 weeks 11/22/2024    In Progress  11/25/2024     5.Fair initial written home exercise program knowledge and be without questions in order to have carryover after discharge from PT.  3 weeks 11/22/2024    In Progress  11/25/2024        LTG Weeks/Visits Date Established  Date Met   1.Decrease his max right knee pain to 3/10 in order to improve his quality of life. 6 weeks 11/22/2024    In Progress  11/25/2024     2. Decrease all right knee's muscular tone to Minimal Increase in order to improve his ability to get in and out of his vehicle, up and down from a low seat, and be able to go from knee flexion to extension without hesitation. 6 weeks 11/22/2024       In Progress  11/25/2024     3.Increase his right knee strength one grade from the evaluation date in order to tolerate his desired standing static or dynamic activities.  6 weeks 11/22/2024    In Progress  11/25/2024     4.Increase his FOTO score to >=35% in order to improve his household and recreational activity ability.  6 weeks 11/22/2024    In Progress  11/25/2024     5.Good Progressed written home exercise program knowledge and be without questions in order to have carryover after discharge from PT.  6 weeks 11/22/2024    In Progress  11/25/2024       PLAN     Cont per Plan of Care, flexibility, strengthening    Nelida Hanks, PTA      No

## 2025-02-02 DIAGNOSIS — I10 ESSENTIAL HYPERTENSION: ICD-10-CM

## 2025-02-03 RX ORDER — AMLODIPINE BESYLATE 2.5 MG/1
2.5 TABLET ORAL DAILY
Qty: 90 TABLET | Refills: 0 | Status: SHIPPED | OUTPATIENT
Start: 2025-02-03

## 2025-04-02 ENCOUNTER — PATIENT MESSAGE (OUTPATIENT)
Dept: ORTHOPEDICS | Facility: CLINIC | Age: 65
End: 2025-04-02

## 2025-04-02 DIAGNOSIS — Z96.651 HISTORY OF TOTAL KNEE ARTHROPLASTY, RIGHT: Primary | ICD-10-CM

## 2025-04-02 DIAGNOSIS — Z98.890 HISTORY OF ARTHROSCOPY OF RIGHT KNEE: ICD-10-CM

## 2025-04-02 NOTE — TELEPHONE ENCOUNTER
Patient requesting referral to another provider.  [facility]: [phone].

## 2025-04-17 DIAGNOSIS — I10 ESSENTIAL HYPERTENSION: ICD-10-CM

## 2025-04-17 RX ORDER — AMLODIPINE BESYLATE 2.5 MG/1
2.5 TABLET ORAL
Qty: 90 TABLET | Refills: 0 | Status: SHIPPED | OUTPATIENT
Start: 2025-04-17

## 2025-06-12 DIAGNOSIS — E78.00 PURE HYPERCHOLESTEROLEMIA: ICD-10-CM

## 2025-06-12 RX ORDER — ROSUVASTATIN CALCIUM 10 MG/1
10 TABLET, COATED ORAL
Qty: 90 TABLET | Refills: 0 | Status: SHIPPED | OUTPATIENT
Start: 2025-06-12

## 2025-06-29 DIAGNOSIS — I10 ESSENTIAL HYPERTENSION: ICD-10-CM

## 2025-06-30 RX ORDER — AMLODIPINE BESYLATE 2.5 MG/1
2.5 TABLET ORAL
Qty: 90 TABLET | Refills: 0 | Status: SHIPPED | OUTPATIENT
Start: 2025-06-30

## (undated) DEVICE — SOLUTION IRRI NS BOTTLE 1000ML R5200-01

## (undated) DEVICE — SLEEVE SCD EXPRESS KNEE MEDIUM

## (undated) DEVICE — TOWEL OR DISP STRL BLUE 4/PK

## (undated) DEVICE — INTERPULSE SET

## (undated) DEVICE — BLADE SAW SGTL 21.2X85.5X1.2

## (undated) DEVICE — KIT VIZADISC KNEE TRACKING

## (undated) DEVICE — GLOVE SENSICARE PI ALOE 8.5

## (undated) DEVICE — BLADE SURG CARBON STEEL #10

## (undated) DEVICE — SYR 50CC LL

## (undated) DEVICE — SPONGE BULKEE II ABSRB 6X6.75

## (undated) DEVICE — SUT FIBERWIRE 2 38 IN TAPER

## (undated) DEVICE — DRAPE INVISISHIELD TOWEL SMALL

## (undated) DEVICE — SOL NACL IRR 3000ML

## (undated) DEVICE — KIT TRIATHLON TIBIAL SIZER

## (undated) DEVICE — NDL SPINAL 18GX3.5 SPINOCAN

## (undated) DEVICE — ELECTRODE REM PLYHSV RETURN 9

## (undated) DEVICE — COVER TABLE 44X90 STERILE

## (undated) DEVICE — TAPE SILK 3IN

## (undated) DEVICE — GOWN TOGA SYS PEELWY ZIP 2 XL

## (undated) DEVICE — DRAPE THREE-QTR REINF 53X77IN

## (undated) DEVICE — ALCOHOL 70% ANTISEPTIC ISO 4OZ

## (undated) DEVICE — DRAPE U SPLIT SHEET 54X76IN

## (undated) DEVICE — DRESSING GAUZE OIL EMUL 3X8

## (undated) DEVICE — SUTURE ETHILON 3-0 PS-1 18 1663H

## (undated) DEVICE — PIN FIXATION BONE 140X3.2MM
Type: IMPLANTABLE DEVICE | Site: KNEE | Status: NON-FUNCTIONAL
Removed: 2024-07-22

## (undated) DEVICE — BANDAGE ESMARK ELASTIC ST 6X9

## (undated) DEVICE — BANDAGE ACE DOUBLE STER 6IN

## (undated) DEVICE — MANIFOLD 4 PORT

## (undated) DEVICE — DRAPE INCISE IOBAN 2 23X33IN

## (undated) DEVICE — KIT DRAPE RIO ONE PIECE W/POCK

## (undated) DEVICE — APPLICATOR CHLORAPREP ORN 26ML

## (undated) DEVICE — CATH URETHRAL RED 16FR

## (undated) DEVICE — WRAP DEMAYO LEG STERILE

## (undated) DEVICE — PADDING CAST SPECIALIST 6X4YD

## (undated) DEVICE — PAD BOVIE ADULT

## (undated) DEVICE — GLOVE BIOGEL PI GOLD SZ  8.5

## (undated) DEVICE — MIXER BONE CEMENT

## (undated) DEVICE — DECANTER FLUID TRNSF WHITE 9IN

## (undated) DEVICE — PACK EXTREMITY ORTHOMAX

## (undated) DEVICE — SOL NACL IRR 1000ML BTL

## (undated) DEVICE — PACK ARTHROSCOPY SMHS009-07

## (undated) DEVICE — SUT MONOCRYL PLUS UD 3-0 27

## (undated) DEVICE — PADDING WYTEX UNDRCST 6INX4YD

## (undated) DEVICE — BNDG COFLEX FOAM LF2 ST 6X5YD

## (undated) DEVICE — PACK CUSTOM UNIV BASIN SLI

## (undated) DEVICE — SOLUTION NACL 0.9% 3000ML

## (undated) DEVICE — PIN BONE 3.2X110MM
Type: IMPLANTABLE DEVICE | Site: KNEE | Status: NON-FUNCTIONAL
Removed: 2024-07-22

## (undated) DEVICE — DRAPE STERI INSTRUMENT 1018

## (undated) DEVICE — CUFF TOURNIQUET 34DUAL PRT 5921-034-235

## (undated) DEVICE — SYS CLSR DERMABOND PRINEO 42CM

## (undated) DEVICE — TOGA FLYTE PEEL AWAY XLARGE

## (undated) DEVICE — KIT TRIATHLON CR TIB PREP SZ7

## (undated) DEVICE — SYS SURGIPHOR STRL IRRG

## (undated) DEVICE — WRAP KNEE ACCU THERM GEL PACK

## (undated) DEVICE — STRAP OR TABLE 5IN X 72IN

## (undated) DEVICE — GLOVE SENSICARE PI GRN 8.5

## (undated) DEVICE — BLADE MAKO STANDARD

## (undated) DEVICE — SUT STRATAFIX PDS 1 CTX 18IN

## (undated) DEVICE — TUBING CYSTO DOUBLE 654301

## (undated) DEVICE — PACK SIRUS BASIC V SURG STRL

## (undated) DEVICE — UNDERGLOVE BIOGEL MICRO BLUE SZ 8.5

## (undated) DEVICE — SUT STRATAFIX SPIRAL VIOLET

## (undated) DEVICE — YANKAUER FLEX NO VENT HI CAP

## (undated) DEVICE — TOURNIQUET SB QC DP 34X4IN